# Patient Record
Sex: MALE | Race: WHITE | NOT HISPANIC OR LATINO | Employment: FULL TIME | URBAN - METROPOLITAN AREA
[De-identification: names, ages, dates, MRNs, and addresses within clinical notes are randomized per-mention and may not be internally consistent; named-entity substitution may affect disease eponyms.]

---

## 2017-02-23 ENCOUNTER — APPOINTMENT (OUTPATIENT)
Dept: LAB | Facility: HOSPITAL | Age: 57
End: 2017-02-23

## 2017-02-28 ENCOUNTER — LAB (OUTPATIENT)
Dept: LAB | Facility: HOSPITAL | Age: 57
End: 2017-02-28

## 2017-02-28 DIAGNOSIS — D69.6 THROMBOCYTOPENIA (HCC): Primary | ICD-10-CM

## 2017-02-28 LAB
BASOPHILS # BLD AUTO: 0.02 10*3/MM3 (ref 0–0.1)
BASOPHILS NFR BLD AUTO: 0.5 % (ref 0–1.1)
DEPRECATED RDW RBC AUTO: 39.7 FL (ref 37–49)
EOSINOPHIL # BLD AUTO: 0.08 10*3/MM3 (ref 0–0.36)
EOSINOPHIL NFR BLD AUTO: 1.8 % (ref 1–5)
ERYTHROCYTE [DISTWIDTH] IN BLOOD BY AUTOMATED COUNT: 12.7 % (ref 11.7–14.5)
HCT VFR BLD AUTO: 38.6 % (ref 40–49)
HGB BLD-MCNC: 13.2 G/DL (ref 13.5–16.5)
IMM GRANULOCYTES # BLD: 0.02 10*3/MM3 (ref 0–0.03)
IMM GRANULOCYTES NFR BLD: 0.5 % (ref 0–0.5)
LYMPHOCYTES # BLD AUTO: 1.07 10*3/MM3 (ref 1–3.5)
LYMPHOCYTES NFR BLD AUTO: 24.3 % (ref 20–49)
MCH RBC QN AUTO: 29.5 PG (ref 27–33)
MCHC RBC AUTO-ENTMCNC: 34.2 G/DL (ref 32–35)
MCV RBC AUTO: 86.2 FL (ref 83–97)
MONOCYTES # BLD AUTO: 0.24 10*3/MM3 (ref 0.25–0.8)
MONOCYTES NFR BLD AUTO: 5.4 % (ref 4–12)
NEUTROPHILS # BLD AUTO: 2.98 10*3/MM3 (ref 1.5–7)
NEUTROPHILS NFR BLD AUTO: 67.5 % (ref 39–75)
NRBC BLD MANUAL-RTO: 0 /100 WBC (ref 0–0)
PLATELET # BLD AUTO: 147 10*3/MM3 (ref 150–375)
PMV BLD AUTO: 10.7 FL (ref 8.9–12.1)
RBC # BLD AUTO: 4.48 10*6/MM3 (ref 4.3–5.5)
WBC NRBC COR # BLD: 4.41 10*3/MM3 (ref 4–10)

## 2017-02-28 PROCEDURE — 85025 COMPLETE CBC W/AUTO DIFF WBC: CPT

## 2017-02-28 PROCEDURE — 36415 COLL VENOUS BLD VENIPUNCTURE: CPT

## 2017-10-17 ENCOUNTER — TELEPHONE (OUTPATIENT)
Dept: ONCOLOGY | Facility: CLINIC | Age: 57
End: 2017-10-17

## 2017-10-17 ENCOUNTER — TELEPHONE (OUTPATIENT)
Dept: ONCOLOGY | Facility: HOSPITAL | Age: 57
End: 2017-10-17

## 2017-10-17 NOTE — TELEPHONE ENCOUNTER
Pt called inquring about labs to be drawn at next appt in December.  I informed him that CBC is ordered, he asked about having CMP.  Discussed with Dr Morrow, will add CMP, pt wants to have these drawn prior to appt, message sent to appt desk to schedule approx  One week before MD appt.

## 2017-11-28 ENCOUNTER — LAB (OUTPATIENT)
Dept: LAB | Facility: HOSPITAL | Age: 57
End: 2017-11-28

## 2017-11-28 DIAGNOSIS — D69.6 THROMBOCYTOPENIA (HCC): Primary | ICD-10-CM

## 2017-11-28 LAB
ALBUMIN SERPL-MCNC: 4.5 G/DL (ref 3.5–5.2)
ALBUMIN/GLOB SERPL: 2 G/DL (ref 1.1–2.4)
ALP SERPL-CCNC: 37 U/L (ref 38–116)
ALT SERPL W P-5'-P-CCNC: 29 U/L (ref 0–41)
ANION GAP SERPL CALCULATED.3IONS-SCNC: 10.1 MMOL/L
AST SERPL-CCNC: 27 U/L (ref 0–40)
BASOPHILS # BLD AUTO: 0 10*3/MM3 (ref 0–0.1)
BASOPHILS NFR BLD AUTO: 0 % (ref 0–1.1)
BILIRUB SERPL-MCNC: 0.6 MG/DL (ref 0.1–1.2)
BUN BLD-MCNC: 24 MG/DL (ref 6–20)
BUN/CREAT SERPL: 21.2 (ref 7.3–30)
CALCIUM SPEC-SCNC: 9.9 MG/DL (ref 8.5–10.2)
CHLORIDE SERPL-SCNC: 103 MMOL/L (ref 98–107)
CO2 SERPL-SCNC: 26.9 MMOL/L (ref 22–29)
CREAT BLD-MCNC: 1.13 MG/DL (ref 0.7–1.3)
DEPRECATED RDW RBC AUTO: 39.3 FL (ref 37–49)
EOSINOPHIL # BLD AUTO: 0.06 10*3/MM3 (ref 0–0.36)
EOSINOPHIL NFR BLD AUTO: 1 % (ref 1–5)
ERYTHROCYTE [DISTWIDTH] IN BLOOD BY AUTOMATED COUNT: 12.4 % (ref 11.7–14.5)
GFR SERPL CREATININE-BSD FRML MDRD: 67 ML/MIN/1.73
GLOBULIN UR ELPH-MCNC: 2.3 GM/DL (ref 1.8–3.5)
GLUCOSE BLD-MCNC: 100 MG/DL (ref 74–124)
HCT VFR BLD AUTO: 40.6 % (ref 40–49)
HGB BLD-MCNC: 14.1 G/DL (ref 13.5–16.5)
IMM GRANULOCYTES # BLD: 0.02 10*3/MM3 (ref 0–0.03)
IMM GRANULOCYTES NFR BLD: 0.3 % (ref 0–0.5)
LYMPHOCYTES # BLD AUTO: 0.81 10*3/MM3 (ref 1–3.5)
LYMPHOCYTES NFR BLD AUTO: 13 % (ref 20–49)
MCH RBC QN AUTO: 30 PG (ref 27–33)
MCHC RBC AUTO-ENTMCNC: 34.7 G/DL (ref 32–35)
MCV RBC AUTO: 86.4 FL (ref 83–97)
MONOCYTES # BLD AUTO: 0.24 10*3/MM3 (ref 0.25–0.8)
MONOCYTES NFR BLD AUTO: 3.9 % (ref 4–12)
NEUTROPHILS # BLD AUTO: 5.08 10*3/MM3 (ref 1.5–7)
NEUTROPHILS NFR BLD AUTO: 81.8 % (ref 39–75)
NRBC BLD MANUAL-RTO: 0 /100 WBC (ref 0–0)
PLATELET # BLD AUTO: 152 10*3/MM3 (ref 150–375)
PMV BLD AUTO: 9.8 FL (ref 8.9–12.1)
POTASSIUM BLD-SCNC: 5.4 MMOL/L (ref 3.5–4.7)
PROT SERPL-MCNC: 6.8 G/DL (ref 6.3–8)
RBC # BLD AUTO: 4.7 10*6/MM3 (ref 4.3–5.5)
SODIUM BLD-SCNC: 140 MMOL/L (ref 134–145)
WBC NRBC COR # BLD: 6.21 10*3/MM3 (ref 4–10)

## 2017-11-28 PROCEDURE — 80053 COMPREHEN METABOLIC PANEL: CPT | Performed by: INTERNAL MEDICINE

## 2017-11-28 PROCEDURE — 36415 COLL VENOUS BLD VENIPUNCTURE: CPT | Performed by: INTERNAL MEDICINE

## 2017-11-28 PROCEDURE — 85025 COMPLETE CBC W/AUTO DIFF WBC: CPT | Performed by: INTERNAL MEDICINE

## 2017-12-04 ENCOUNTER — APPOINTMENT (OUTPATIENT)
Dept: LAB | Facility: HOSPITAL | Age: 57
End: 2017-12-04

## 2017-12-04 ENCOUNTER — OFFICE VISIT (OUTPATIENT)
Dept: ONCOLOGY | Facility: CLINIC | Age: 57
End: 2017-12-04

## 2017-12-04 VITALS
BODY MASS INDEX: 25.68 KG/M2 | RESPIRATION RATE: 16 BRPM | DIASTOLIC BLOOD PRESSURE: 82 MMHG | SYSTOLIC BLOOD PRESSURE: 138 MMHG | OXYGEN SATURATION: 100 % | WEIGHT: 150.4 LBS | TEMPERATURE: 98.4 F | HEART RATE: 65 BPM | HEIGHT: 64 IN

## 2017-12-04 DIAGNOSIS — C91.41 HAIRY CELL LEUKEMIA, IN REMISSION (HCC): Primary | ICD-10-CM

## 2017-12-04 PROCEDURE — G0463 HOSPITAL OUTPT CLINIC VISIT: HCPCS | Performed by: INTERNAL MEDICINE

## 2017-12-04 PROCEDURE — 99213 OFFICE O/P EST LOW 20 MIN: CPT | Performed by: INTERNAL MEDICINE

## 2017-12-04 NOTE — PROGRESS NOTES
Subjective .  Feels excellent    REASONS FOR FOLLOWUP:    1. Presentation with thrombocytopenia, mild leukopenia and splenomegaly.   2. Diagnosis hairy cell leukemia.   3. Status post hospitalization 02/06/2012-02/15/2012 per treatment with cladribine (2CdA x 7 days continuous IV infusion).   4. Evidence of CR noted 05/09/2012 with interval resolution of splenomegaly, notable on physical exam and peripheral blood smear.   5. Patien t with continued CR noted 09/05/2012, every 3 month assessment to 1 year anniversary planned, every 4 months 2nd year, every 6 months years 3-5.   6. Patient seen 03/05/2013 stable, with plans to move to every 4 month assessment. Patient proceeding through t reatment with Suboxone.   7. The patient was seen on 10/25/2013, stable hematologically and reassessment q.6 months planned.   8. The patient was seen 04/10/2014, status post recent apparent viral illness with normalization of peripheral blood counts and symptoms.   9. Patient seen 09/26/2014, stable - continued remission noted.   10. Patient seen on 03/17/2015, stable in continued remission, every 6 month assessment planned.                    11.  Patient was seen 10/13/2015 again in continued                                         remission, every 6 month CBC, planned with yearly                                   followup per MD.                    12.  Patient seen in office August 04, 2016, stable, no                                        evidence recurrent disease        13.  Patient reviewed December 04, 2017, continued remission    HISTORY OF PRESENT ILLNESS:  The patient is a 57 y.o. year old male who is here for follow-up with the above-mentioned history.    History of Present Illness     The patient is a 57-year-old male who has been previously healthy. He had some routine blood work performed recently with Dr. Sinclair on 01/17/2012 showing significant thrombocytopenia with a platelet count of 79,000. His white count  was low normal at 4700 with decreased granulocytes at 31% with lymphocytes elevated at 55%. His serum chemistries at the time were unremarkable including normal liver function tests. Mr. Morales had been feeling reasonably w ell although he reported some fatigue. He has had no fevers, chills, night sweats or unexplained weight loss. He has been on a weight loss program and following a low carb diet and has intentionally lost about 40 pounds in the last year or two. He had noted some easier bruising but had been taking aspirin and fish oil even prior to noticing his platelet count was low.   On his examination in the office 01/31/2012 the patient had significant splenomegaly with spleen tip palpable approximately 7-8 cm below the left costal margin. The spleen is not particularly tender and did not extend to midline. Peripheral smear showed large platelets and atypical lymphocytes with no blasts. Mr. Morales was assessed by flow cytometry peripheral blood showing androgeni c profile consistent with hairy cell leukemia. Bone marrow aspirate and biopsy also was confirmatory for hairy cell leukemia with normal chromosomal complement. 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 Additionally CT abdomen and pelvis demonstrated splenomegaly with spleen measuring 22 cm in craniocaudal extent up to 16.7 x 8 in the axial plane. No other abnormalities were present. After discussion the patient was advised per linda osborn with 2CdA. He was admitted 02/06/2015-02/15/2012 with a dual lumen PICC line placed. 2CdA began after initial hydration and continued over the subsequent 7 days by continuous IV infusion. He did relatively well except for weakness and fatigue. He ha d additional issues with constipation and exacerbation of chronic low back pain. He further had development of neutropenia 02/12/2012 and was placed on Diflucan, Valtrex and Levaquin. These he  also tolerated well. He also required transfusion just prior to discharge. Finally the patient was asked to return 02/16/2012 for Neulasta which he did return back to take in the office. He subsequently has done relatively well as an outpatient with white count increasing to 13,000 by 02/20/2012 and peaking at 20 , 000 by 02/24/2012. Platelet count also improved substantially peaking 02/23/2012 at 177,000. He has otherwise returned and we were able to remove his PICC line by 02/24/2012 and on 02/27/2012 continued resolution of his leukopenia, antiviral and antibac t erial medication discontinued. The patient now returns today feeling weak in the morning but otherwise doing well for the rest of the day without any fevers, chills, and resolution of night sweats. Appetite, weight and performance status have remained o verall excellent. He has an episode of gouty arthropathy involving his left great toe. He used Indocin briefly and then went back to allopurinol but is now having his gout return this morning.   The patient thereafter did use Indocin successful and thereafter restarted allopurinol. His gouty arthropathy responded appropriately and has not returned. Followup counts were requested and as he returns today Mr. Morales is feeling well. He has returne d to exercise and nearly to his full-time job. He still notes some degree of fatigue in the a.m. and by the early afternoon approximately 12:30-1:00 p.m. He is at work full-time. He has had no fever, chills, weight loss, night sweats, nausea or vomiting, c hange in bowel habit.   The patient was asked to return back for followup reassessment. CT scan 05/02/2012 per splenomegaly showed spleen to have regressed substantially in size. The AP in 1st dimension previously 16.7 x 8 is now 12.2 by 6.1. No other abno rmalities were present. Peripheral smear also shows no evidence of recurrent disease. The patient feels well and has returned to normal activity.   The  patient was asked to return for followup now seen 09/05/2012 doing well with normal activity again with considerable improvement in his general performance status.   The patient is now seen 03/05/2013 continuing to do well. It came to the attention however through a [facility] report that he is current on Suboxone through Dr. Kalia Davidson. In discussion with the patient he evidently had difficulty in trying to discontinue pain medications in the spring of 2012. He eventually saw Dr. Davidson who was hoping to wean him altogether from pain medications with the use of Suboxone. 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 He does this primarily through diet at present.   The patient thereafter was asked to return in followup. He is now seen on 10/25/2013 feeling well overall, continuing with exercise and dietary program. He feels well overall except for mild headache, approximately at 3:00-4:00 p.m. each day(?).   The patient thereafter was asked to be seen back 6 months later . He is now seen 09/26/2014 doing well continuing his exercise and dietary program to terrific effect. He has had no issues since last seen and in fact has normalized his hypertension as a result of his dietary program.   The patient thereafter was asked to be seen 6 months later. He continues to do his exercise and dietary program to wonderful effect. He has had no additional issues since last seen.   The patient is now seen back 6 months later, 10/13/2015, and fortunately does continue to do amazingly well. He has maintained his exercise and dietary program, again to excellent and continued significant effect.   Patient now reviewed August 04, 2016.  He continues to feel well except for periodic fatigue.    The patient is reviewed December 04, 2017.  Hematologically he is stable and remains in remission.  Plans  are to eventually discontinue Suboxone as well.    Past Medical History:   Diagnosis Date   • Cancer     hairy cell leukemia   • Coronary artery disease     minimal with some minimal narrowing of 1 vessel   • ED (erectile dysfunction) of organic origin    • H/O vitamin D deficiency    • Hypertension    • Thrombocytopenia        ONCOLOGIC HISTORY:  (History from previous dates can be found in the separate document.)    Current Outpatient Prescriptions on File Prior to Visit   Medication Sig Dispense Refill   • indomethacin (INDOCIN) 25 MG capsule Take 25 mg by mouth 3 (three) times a day as needed for mild pain (1-3).     • LEVITRA 20 MG tablet TK 1 T PO  PRN  0   • SUBOXONE 12-3 MG film sublingual film dissolve 1 FILM under the tongue every morning  0     No current facility-administered medications on file prior to visit.        ALLERGIES:     Allergies   Allergen Reactions   • Ciprofloxacin Rash       Social History     Social History   • Marital status:      Spouse name: Radha   • Number of children: N/A   • Years of education: N/A     Occupational History   •  [employer]     Social History Main Topics   • Smoking status: Never Smoker   • Smokeless tobacco: Never Used   • Alcohol use 1.2 oz/week     2 Cans of beer per week      Comment: social   • Drug use: Not on file   • Sexual activity: Not on file     Other Topics Concern   • Not on file     Social History Narrative         Cancer-related family history is not on file.     Review of Systems  A comprehensive 14 point review of systems was performed and was negative except as mentioned.    Objective      There were no vitals filed for this visit.  Current Status 8/4/2016   ECOG score 0       Physical Exam    GENERAL: Well-developed, well-nourished  male in no acute distress.   SKIN: Warm, dry without rashes, purpura or petechiae.   HEAD: Normocephalic.   EYES: Pupils equal, round and reactive to light. EOMs intact.  Conjunctivae normal.   EARS: Hearing intact.   NOSE: Septum midline. No excoriations or nasal discharge.   MOUTH: Tongue is well-papillated; no stomatitis or ulcers. Lips normal.   THROAT: Oropharynx without lesions or exudates.   NECK: Supple with good range of motion; no thyromegaly or masses, no JVD or bruits.   LYMPHATICS: No cervical, supraclavicular, axillary or inguinal adenopathy.   CHEST: Lungs clear to percussion and auscultation.   CARDIAC: Regular rate and rhythm without murmurs, rubs or gallops.   ABDOMEN: Soft, nontender with no organomegaly or masses.   EXTREMITIES: No clubbing, cyanosis or edema.   NEUROLOGICAL: No focal neurological deficits.    RECENT LABS:  Hematology WBC   Date Value Ref Range Status   11/28/2017 6.21 4.00 - 10.00 10*3/mm3 Final     RBC   Date Value Ref Range Status   11/28/2017 4.70 4.30 - 5.50 10*6/mm3 Final     Hemoglobin   Date Value Ref Range Status   11/28/2017 14.1 13.5 - 16.5 g/dL Final     Hematocrit   Date Value Ref Range Status   11/28/2017 40.6 40.0 - 49.0 % Final     Platelets   Date Value Ref Range Status   11/28/2017 152 150 - 375 10*3/mm3 Final        Assessment/Plan       A 57-year-old male with a history of hairy cell leukemia. He presented with leukopenia, thrombocytopenia, and splenomegaly. After diagnosis was confirmed he was treated with cladribine 02/06/2012-02/15/2012 by continuous IV infusion. H e developed cytopenias, but again not to a serious degree and ultimately exam in late February 2012 was negative for evidence of residual and flow cytometric assessment. Subsequent physical examination showed resolution of splenomegaly and normalization o f performance status. Followup CT also normalized as well per splenomegaly. The patient has been followed since and when seen in March 2013 and October 2013, was felt to be in CR. He had been seen just after recent viral illness and though he was feeling poorly, symptoms went on to improve and returned to  baseline status. At 6 month review, the patient continues in remission. This further vindicates and since last visit, he has had no additional urinary symptoms either.   At this point we find no evidence of recurrent disease and again feel that Mr. Morales remains in complete remission. This has been the case in 03/17/2015 and again is notable 10/13/2015 as well as April 2016, August 2016 and December 2017                                                                              We will plan:   1. To have a CBC check every 6 months and seen by the physician yearly.

## 2018-02-07 ENCOUNTER — OFFICE VISIT (OUTPATIENT)
Dept: INTERNAL MEDICINE | Facility: CLINIC | Age: 58
End: 2018-02-07

## 2018-02-07 VITALS
SYSTOLIC BLOOD PRESSURE: 128 MMHG | WEIGHT: 146 LBS | HEIGHT: 64 IN | DIASTOLIC BLOOD PRESSURE: 86 MMHG | BODY MASS INDEX: 24.92 KG/M2

## 2018-02-07 DIAGNOSIS — D69.6 THROMBOCYTOPENIA (HCC): ICD-10-CM

## 2018-02-07 DIAGNOSIS — J30.1 CHRONIC SEASONAL ALLERGIC RHINITIS DUE TO POLLEN: Chronic | ICD-10-CM

## 2018-02-07 DIAGNOSIS — C91.41 HAIRY CELL LEUKEMIA, IN REMISSION (HCC): ICD-10-CM

## 2018-02-07 DIAGNOSIS — N52.01 ERECTILE DYSFUNCTION DUE TO ARTERIAL INSUFFICIENCY: Chronic | ICD-10-CM

## 2018-02-07 DIAGNOSIS — E55.9 VITAMIN D DEFICIENCY: Primary | Chronic | ICD-10-CM

## 2018-02-07 PROBLEM — R03.0 SINGLE EPISODE OF ELEVATED BLOOD PRESSURE: Status: ACTIVE | Noted: 2017-12-12

## 2018-02-07 LAB
25(OH)D3 SERPL-MCNC: 49.2 NG/ML (ref 30–100)
ALBUMIN SERPL-MCNC: 4.7 G/DL (ref 3.5–5.2)
ALBUMIN/GLOB SERPL: 1.9 G/DL
ALP SERPL-CCNC: 38 U/L (ref 39–117)
ALT SERPL W P-5'-P-CCNC: 22 U/L (ref 1–41)
ANION GAP SERPL CALCULATED.3IONS-SCNC: 13.4 MMOL/L
AST SERPL-CCNC: 22 U/L (ref 1–40)
BASOPHILS # BLD AUTO: 0.01 10*3/MM3 (ref 0–0.2)
BASOPHILS NFR BLD AUTO: 0.2 % (ref 0–2)
BILIRUB SERPL-MCNC: 0.7 MG/DL (ref 0.1–1.2)
BILIRUB UR QL STRIP: NEGATIVE
BUN BLD-MCNC: 26 MG/DL (ref 6–20)
BUN/CREAT SERPL: 24.1 (ref 7–25)
CALCIUM SPEC-SCNC: 10.3 MG/DL (ref 8.6–10.5)
CHLORIDE SERPL-SCNC: 103 MMOL/L (ref 98–107)
CLARITY UR: CLEAR
CO2 SERPL-SCNC: 27.6 MMOL/L (ref 22–29)
COLOR UR: YELLOW
CREAT BLD-MCNC: 1.08 MG/DL (ref 0.76–1.27)
DEPRECATED RDW RBC AUTO: 41.1 FL (ref 37–54)
EOSINOPHIL # BLD AUTO: 0.11 10*3/MM3 (ref 0–0.7)
EOSINOPHIL NFR BLD AUTO: 2.3 % (ref 0–5)
ERYTHROCYTE [DISTWIDTH] IN BLOOD BY AUTOMATED COUNT: 13 % (ref 11.5–15)
GFR SERPL CREATININE-BSD FRML MDRD: 70 ML/MIN/1.73
GLOBULIN UR ELPH-MCNC: 2.5 GM/DL
GLUCOSE BLD-MCNC: 93 MG/DL (ref 65–99)
GLUCOSE UR STRIP-MCNC: NEGATIVE MG/DL
HCT VFR BLD AUTO: 42.5 % (ref 40.1–51)
HGB BLD-MCNC: 14.4 G/DL (ref 13.7–17.5)
HGB UR QL STRIP.AUTO: NEGATIVE
KETONES UR QL STRIP: NEGATIVE
LEUKOCYTE ESTERASE UR QL STRIP.AUTO: NEGATIVE
LYMPHOCYTES # BLD AUTO: 1.02 10*3/MM3 (ref 0.8–7)
LYMPHOCYTES NFR BLD AUTO: 20.9 % (ref 10–60)
MCH RBC QN AUTO: 29.9 PG (ref 26–34)
MCHC RBC AUTO-ENTMCNC: 33.9 G/DL (ref 31–37)
MCV RBC AUTO: 88.2 FL (ref 80–100)
MONOCYTES # BLD AUTO: 0.25 10*3/MM3 (ref 0–1)
MONOCYTES NFR BLD AUTO: 5.1 % (ref 0–13)
NEUTROPHILS # BLD AUTO: 3.49 10*3/MM3 (ref 1–11)
NEUTROPHILS NFR BLD AUTO: 71.5 % (ref 30–85)
NITRITE UR QL STRIP: NEGATIVE
PH UR STRIP.AUTO: 5.5 [PH] (ref 5–8)
PLATELET # BLD AUTO: 169 10*3/MM3 (ref 150–450)
PMV BLD AUTO: 11.4 FL (ref 6–12)
POTASSIUM BLD-SCNC: 5.3 MMOL/L (ref 3.5–5.2)
PROT SERPL-MCNC: 7.2 G/DL (ref 6–8.5)
PROT UR QL STRIP: NEGATIVE
RBC # BLD AUTO: 4.82 10*6/MM3 (ref 4.63–6.08)
SODIUM BLD-SCNC: 144 MMOL/L (ref 136–145)
SP GR UR STRIP: 1.01 (ref 1–1.03)
UROBILINOGEN UR QL STRIP: NORMAL
WBC NRBC COR # BLD: 4.88 10*3/MM3 (ref 5–10)

## 2018-02-07 PROCEDURE — 99214 OFFICE O/P EST MOD 30 MIN: CPT | Performed by: INTERNAL MEDICINE

## 2018-02-07 PROCEDURE — 36415 COLL VENOUS BLD VENIPUNCTURE: CPT | Performed by: INTERNAL MEDICINE

## 2018-02-07 PROCEDURE — 81003 URINALYSIS AUTO W/O SCOPE: CPT | Performed by: INTERNAL MEDICINE

## 2018-02-07 PROCEDURE — 80053 COMPREHEN METABOLIC PANEL: CPT | Performed by: INTERNAL MEDICINE

## 2018-02-07 PROCEDURE — 82306 VITAMIN D 25 HYDROXY: CPT | Performed by: INTERNAL MEDICINE

## 2018-02-07 PROCEDURE — 85025 COMPLETE CBC W/AUTO DIFF WBC: CPT | Performed by: INTERNAL MEDICINE

## 2018-02-07 RX ORDER — SILDENAFIL CITRATE 20 MG/1
20-100 TABLET ORAL
COMMUNITY
Start: 2018-01-30 | End: 2018-08-14 | Stop reason: SDUPTHER

## 2018-02-07 NOTE — PROGRESS NOTES
Subjective   Solo Morales is a 57 y.o. male.     History of Present Illness     The following portions of the patient's history were reviewed and updated as appropriate: allergies, current medications, past family history, past medical history, past social history, past surgical history and problem list.  58 yo/m with Hairy cell Leukemia (in remission), ED, Vitamin D deficiency, Thrombocytopenia, allergic rhinitis, here for follow up. He is doing well overall. He is in a successful suboxone program. Needs a lab check today.  Review of Systems   Constitutional: Positive for fatigue.   HENT: Negative.    Eyes: Negative.    Respiratory: Negative.    Cardiovascular: Negative.    Gastrointestinal: Negative.    Endocrine: Negative.    Genitourinary: Negative.    Musculoskeletal: Negative.    Skin: Negative.    Allergic/Immunologic: Negative.    Neurological: Negative.    Hematological: Negative.    Psychiatric/Behavioral: Negative.        Objective   Physical Exam   Constitutional: He is oriented to person, place, and time. He appears well-developed and well-nourished.   HENT:   Head: Normocephalic and atraumatic.   Eyes: EOM are normal. Pupils are equal, round, and reactive to light.   Neck: Normal range of motion. Neck supple.   Cardiovascular: Normal rate, regular rhythm and normal heart sounds.    Pulmonary/Chest: Effort normal and breath sounds normal.   Abdominal: Soft. Bowel sounds are normal.   Musculoskeletal: Normal range of motion.   Neurological: He is alert and oriented to person, place, and time. He has normal reflexes.   Skin: Skin is warm and dry.   Psychiatric: He has a normal mood and affect. His behavior is normal. Judgment and thought content normal.   Nursing note and vitals reviewed.      Assessment/Plan   Solo was seen today for hypertension.    Diagnoses and all orders for this visit:    Vitamin D deficiency  Comments:  well controlled at present but will check levels today  Orders:  -     Vitamin  D 25 Hydroxy; Future  -     Comprehensive Metabolic Panel; Future    Erectile dysfunction due to arterial insufficiency  Comments:  well controlled  Orders:  -     Comprehensive Metabolic Panel; Future    Thrombocytopenia  Comments:  will check CBC today  Orders:  -     CBC & Differential; Future    Hairy cell leukemia, in remission  Comments:  will check a CBC  Orders:  -     CBC & Differential; Future    Chronic seasonal allergic rhinitis due to pollen  Comments:  does better during the winter

## 2018-03-26 ENCOUNTER — TELEPHONE (OUTPATIENT)
Dept: INTERNAL MEDICINE | Facility: CLINIC | Age: 58
End: 2018-03-26

## 2018-03-26 RX ORDER — CLARITHROMYCIN 500 MG/1
500 TABLET, COATED ORAL EVERY 12 HOURS SCHEDULED
Qty: 20 TABLET | Refills: 1 | Status: SHIPPED | OUTPATIENT
Start: 2018-03-26 | End: 2018-08-14

## 2018-03-26 RX ORDER — ALBUTEROL SULFATE 90 UG/1
2 AEROSOL, METERED RESPIRATORY (INHALATION) EVERY 4 HOURS PRN
Qty: 1 INHALER | Refills: 3 | Status: SHIPPED | OUTPATIENT
Start: 2018-03-26 | End: 2019-12-16 | Stop reason: SDUPTHER

## 2018-03-26 NOTE — TELEPHONE ENCOUNTER
----- Message from Diana Cardona sent at 3/26/2018 10:53 AM EDT -----  Contact: Pt   Pt would like a refill for his proventil 6.7grams 200 puffs HFA (not on chart)    [facility] [address] [address] AT [facility] [address] - [phone]  - [phone] FX    Pt also states he will be traveling and would like an antibiotic to take just incase.    Please advise.  Pt#[phone]

## 2018-05-21 ENCOUNTER — LAB (OUTPATIENT)
Dept: LAB | Facility: HOSPITAL | Age: 58
End: 2018-05-21

## 2018-05-21 ENCOUNTER — CLINICAL SUPPORT (OUTPATIENT)
Dept: ONCOLOGY | Facility: HOSPITAL | Age: 58
End: 2018-05-21

## 2018-05-21 DIAGNOSIS — D69.6 THROMBOCYTOPENIA (HCC): Primary | ICD-10-CM

## 2018-05-21 LAB
BASOPHILS # BLD AUTO: 0.02 10*3/MM3 (ref 0–0.1)
BASOPHILS NFR BLD AUTO: 0.4 % (ref 0–1.1)
DEPRECATED RDW RBC AUTO: 38.5 FL (ref 37–49)
EOSINOPHIL # BLD AUTO: 0.1 10*3/MM3 (ref 0–0.36)
EOSINOPHIL NFR BLD AUTO: 2.2 % (ref 1–5)
ERYTHROCYTE [DISTWIDTH] IN BLOOD BY AUTOMATED COUNT: 12.6 % (ref 11.7–14.5)
HCT VFR BLD AUTO: 40.5 % (ref 40–49)
HGB BLD-MCNC: 14.1 G/DL (ref 13.5–16.5)
IMM GRANULOCYTES # BLD: 0.03 10*3/MM3 (ref 0–0.03)
IMM GRANULOCYTES NFR BLD: 0.7 % (ref 0–0.5)
LYMPHOCYTES # BLD AUTO: 1.63 10*3/MM3 (ref 1–3.5)
LYMPHOCYTES NFR BLD AUTO: 35.7 % (ref 20–49)
MCH RBC QN AUTO: 29.3 PG (ref 27–33)
MCHC RBC AUTO-ENTMCNC: 34.8 G/DL (ref 32–35)
MCV RBC AUTO: 84.2 FL (ref 83–97)
MONOCYTES # BLD AUTO: 0.3 10*3/MM3 (ref 0.25–0.8)
MONOCYTES NFR BLD AUTO: 6.6 % (ref 4–12)
NEUTROPHILS # BLD AUTO: 2.49 10*3/MM3 (ref 1.5–7)
NEUTROPHILS NFR BLD AUTO: 54.4 % (ref 39–75)
NRBC BLD MANUAL-RTO: 0 /100 WBC (ref 0–0)
PLATELET # BLD AUTO: 152 10*3/MM3 (ref 150–375)
PMV BLD AUTO: 10.5 FL (ref 8.9–12.1)
RBC # BLD AUTO: 4.81 10*6/MM3 (ref 4.3–5.5)
WBC NRBC COR # BLD: 4.57 10*3/MM3 (ref 4–10)

## 2018-05-21 PROCEDURE — 36415 COLL VENOUS BLD VENIPUNCTURE: CPT | Performed by: INTERNAL MEDICINE

## 2018-05-21 PROCEDURE — 85025 COMPLETE CBC W/AUTO DIFF WBC: CPT | Performed by: INTERNAL MEDICINE

## 2018-05-21 NOTE — PROGRESS NOTES
Pt left appt before seeing RN. Called patient and he said Dr Morrow called him and told him lab results were wnl. Pt had no questions or concerns.

## 2018-07-26 DIAGNOSIS — N52.01 ERECTILE DYSFUNCTION DUE TO ARTERIAL INSUFFICIENCY: ICD-10-CM

## 2018-07-26 DIAGNOSIS — C91.41 HAIRY CELL LEUKEMIA, IN REMISSION (HCC): Primary | ICD-10-CM

## 2018-08-07 ENCOUNTER — LAB (OUTPATIENT)
Dept: INTERNAL MEDICINE | Facility: CLINIC | Age: 58
End: 2018-08-07

## 2018-08-07 DIAGNOSIS — C91.41 HAIRY CELL LEUKEMIA, IN REMISSION (HCC): ICD-10-CM

## 2018-08-07 DIAGNOSIS — N52.01 ERECTILE DYSFUNCTION DUE TO ARTERIAL INSUFFICIENCY: ICD-10-CM

## 2018-08-07 LAB
ALBUMIN SERPL-MCNC: 4.6 G/DL (ref 3.5–5.2)
ALBUMIN/GLOB SERPL: 2.2 G/DL
ALP SERPL-CCNC: 42 U/L (ref 39–117)
ALT SERPL W P-5'-P-CCNC: 27 U/L (ref 1–41)
ANION GAP SERPL CALCULATED.3IONS-SCNC: 10.4 MMOL/L
AST SERPL-CCNC: 22 U/L (ref 1–40)
BASOPHILS # BLD AUTO: 0.02 10*3/MM3 (ref 0–0.2)
BASOPHILS NFR BLD AUTO: 0.5 % (ref 0–2)
BILIRUB SERPL-MCNC: 0.6 MG/DL (ref 0.1–1.2)
BUN BLD-MCNC: 18 MG/DL (ref 6–20)
BUN/CREAT SERPL: 17.3 (ref 7–25)
CALCIUM SPEC-SCNC: 9.8 MG/DL (ref 8.6–10.5)
CHLORIDE SERPL-SCNC: 104 MMOL/L (ref 98–107)
CO2 SERPL-SCNC: 26.6 MMOL/L (ref 22–29)
CREAT BLD-MCNC: 1.04 MG/DL (ref 0.76–1.27)
DEPRECATED RDW RBC AUTO: 40.2 FL (ref 37–54)
EOSINOPHIL # BLD AUTO: 0.08 10*3/MM3 (ref 0–0.7)
EOSINOPHIL NFR BLD AUTO: 1.8 % (ref 0–5)
ERYTHROCYTE [DISTWIDTH] IN BLOOD BY AUTOMATED COUNT: 13.1 % (ref 11.5–15)
GFR SERPL CREATININE-BSD FRML MDRD: 73 ML/MIN/1.73
GLOBULIN UR ELPH-MCNC: 2.1 GM/DL
GLUCOSE BLD-MCNC: 101 MG/DL (ref 65–99)
HCT VFR BLD AUTO: 40 % (ref 40.1–51)
HGB BLD-MCNC: 14 G/DL (ref 13.7–17.5)
LYMPHOCYTES # BLD AUTO: 0.89 10*3/MM3 (ref 0.8–7)
LYMPHOCYTES NFR BLD AUTO: 20.6 % (ref 10–60)
MCH RBC QN AUTO: 30.2 PG (ref 26–34)
MCHC RBC AUTO-ENTMCNC: 35 G/DL (ref 31–37)
MCV RBC AUTO: 86.2 FL (ref 80–100)
MONOCYTES # BLD AUTO: 0.22 10*3/MM3 (ref 0–1)
MONOCYTES NFR BLD AUTO: 5.1 % (ref 0–13)
NEUTROPHILS # BLD AUTO: 3.12 10*3/MM3 (ref 1–11)
NEUTROPHILS NFR BLD AUTO: 72 % (ref 30–85)
PLATELET # BLD AUTO: 158 10*3/MM3 (ref 150–450)
PMV BLD AUTO: 10.3 FL (ref 6–12)
POTASSIUM BLD-SCNC: 4.9 MMOL/L (ref 3.5–5.2)
PROT SERPL-MCNC: 6.7 G/DL (ref 6–8.5)
RBC # BLD AUTO: 4.64 10*6/MM3 (ref 4.63–6.08)
SODIUM BLD-SCNC: 141 MMOL/L (ref 136–145)
TSH SERPL DL<=0.05 MIU/L-ACNC: 2.6 MIU/ML (ref 0.27–4.2)
WBC NRBC COR # BLD: 4.33 10*3/MM3 (ref 5–10)

## 2018-08-07 PROCEDURE — 84443 ASSAY THYROID STIM HORMONE: CPT | Performed by: INTERNAL MEDICINE

## 2018-08-07 PROCEDURE — 36415 COLL VENOUS BLD VENIPUNCTURE: CPT | Performed by: INTERNAL MEDICINE

## 2018-08-07 PROCEDURE — 85025 COMPLETE CBC W/AUTO DIFF WBC: CPT | Performed by: INTERNAL MEDICINE

## 2018-08-07 PROCEDURE — 80053 COMPREHEN METABOLIC PANEL: CPT | Performed by: INTERNAL MEDICINE

## 2018-08-14 ENCOUNTER — OFFICE VISIT (OUTPATIENT)
Dept: INTERNAL MEDICINE | Facility: CLINIC | Age: 58
End: 2018-08-14

## 2018-08-14 VITALS
SYSTOLIC BLOOD PRESSURE: 128 MMHG | HEIGHT: 64 IN | BODY MASS INDEX: 25.61 KG/M2 | WEIGHT: 150 LBS | DIASTOLIC BLOOD PRESSURE: 84 MMHG

## 2018-08-14 DIAGNOSIS — N52.01 ERECTILE DYSFUNCTION DUE TO ARTERIAL INSUFFICIENCY: ICD-10-CM

## 2018-08-14 DIAGNOSIS — D69.6 THROMBOCYTOPENIA (HCC): ICD-10-CM

## 2018-08-14 DIAGNOSIS — E55.9 VITAMIN D DEFICIENCY: ICD-10-CM

## 2018-08-14 DIAGNOSIS — Z51.81 ENCOUNTER FOR MONITORING SUBOXONE MAINTENANCE THERAPY: Chronic | ICD-10-CM

## 2018-08-14 DIAGNOSIS — Z12.11 ENCOUNTER FOR SCREENING COLONOSCOPY: ICD-10-CM

## 2018-08-14 DIAGNOSIS — Z79.899 ENCOUNTER FOR MONITORING SUBOXONE MAINTENANCE THERAPY: Chronic | ICD-10-CM

## 2018-08-14 DIAGNOSIS — C91.41 HAIRY CELL LEUKEMIA, IN REMISSION (HCC): Primary | Chronic | ICD-10-CM

## 2018-08-14 PROCEDURE — 99214 OFFICE O/P EST MOD 30 MIN: CPT | Performed by: INTERNAL MEDICINE

## 2018-08-14 RX ORDER — BUPRENORPHINE HYDROCHLORIDE AND NALOXONE HYDROCHLORIDE DIHYDRATE 8; 2 MG/1; MG/1
TABLET SUBLINGUAL
Refills: 0 | COMMUNITY
Start: 2018-07-24

## 2018-08-14 RX ORDER — VARDENAFIL HYDROCHLORIDE 20 MG/1
TABLET ORAL
COMMUNITY
Start: 2018-03-09 | End: 2018-08-14 | Stop reason: SDUPTHER

## 2018-08-14 RX ORDER — VARDENAFIL HYDROCHLORIDE 20 MG/1
20 TABLET ORAL AS NEEDED
Qty: 20 TABLET | Refills: 3 | Status: SHIPPED | OUTPATIENT
Start: 2018-08-14 | End: 2019-02-04 | Stop reason: SDUPTHER

## 2018-08-14 RX ORDER — SILDENAFIL CITRATE 20 MG/1
20-100 TABLET ORAL 3 TIMES DAILY
Qty: 450 TABLET | Refills: 11 | Status: SHIPPED | OUTPATIENT
Start: 2018-08-14 | End: 2019-02-06 | Stop reason: SDUPTHER

## 2018-08-14 NOTE — PROGRESS NOTES
Subjective   Solo Morales is a 58 y.o. male.     History of Present Illness     The following portions of the patient's history were reviewed and updated as appropriate: allergies, current medications, past family history, past medical history, past social history, past surgical history and problem list.  59 yo/m with a chief complaint of recovering from hairy cell leukemia (currently in remission), on suboxone therapy (doing extremely well), thrombocytopenia (stable at 158K), ED (well compensated), here for follow up. He is doing well overall but needs a colonoscopy.  Review of Systems   Constitutional: Negative.    HENT: Negative.    Eyes: Negative.    Respiratory: Negative.    Cardiovascular: Negative.    Gastrointestinal: Negative.    Endocrine: Negative.    Musculoskeletal: Negative.    Skin: Negative.    Allergic/Immunologic: Negative.    Neurological: Negative.    Hematological: Negative.    Psychiatric/Behavioral: Negative.        Objective   Physical Exam   Constitutional: He is oriented to person, place, and time. He appears well-developed and well-nourished.   HENT:   Head: Normocephalic and atraumatic.   Eyes: Pupils are equal, round, and reactive to light. EOM are normal.   Neck: Normal range of motion. Neck supple.   Cardiovascular: Normal rate, regular rhythm and normal heart sounds.    Pulmonary/Chest: Effort normal and breath sounds normal.   Abdominal: Soft. Bowel sounds are normal.   Musculoskeletal: Normal range of motion.   Neurological: He is alert and oriented to person, place, and time.   Skin: Skin is warm and dry.   Psychiatric: He has a normal mood and affect. His behavior is normal. Judgment and thought content normal.   Nursing note and vitals reviewed.        Assessment/Plan   Solo was seen today for follow-up and nail problem.    Diagnoses and all orders for this visit:    Hairy cell leukemia, in remission (CMS/HCC)  Comments:  doing extremely well    Thrombocytopenia  (CMS/[facility])  Comments:  stable with a plt count of 158 K    Erectile dysfunction due to arterial insufficiency  Comments:  doing well with levitra    Vitamin D deficiency  Comments:  continue vitamin D    Encounter for monitoring Suboxone maintenance therapy  Comments:  doing extremely well

## 2018-08-22 ENCOUNTER — TELEPHONE (OUTPATIENT)
Dept: INTERNAL MEDICINE | Facility: CLINIC | Age: 58
End: 2018-08-22

## 2018-08-22 NOTE — TELEPHONE ENCOUNTER
----- Message from Tomasa Link sent at 8/22/2018 11:59 AM EDT -----  Contact: [facility]  [facility] calling and states patient does not want sildenafil (REVATIO) 20 MG tablet, but would like to take what he was previously on. sildenafil 40 mg Laura. Please advise    [facility]:[phone]

## 2018-09-20 ENCOUNTER — OFFICE VISIT (OUTPATIENT)
Dept: INTERNAL MEDICINE | Facility: CLINIC | Age: 58
End: 2018-09-20

## 2018-09-20 VITALS
BODY MASS INDEX: 25.95 KG/M2 | SYSTOLIC BLOOD PRESSURE: 122 MMHG | TEMPERATURE: 97.7 F | HEART RATE: 52 BPM | DIASTOLIC BLOOD PRESSURE: 78 MMHG | WEIGHT: 152 LBS | OXYGEN SATURATION: 98 % | HEIGHT: 64 IN

## 2018-09-20 DIAGNOSIS — J01.10 ACUTE NON-RECURRENT FRONTAL SINUSITIS: Primary | ICD-10-CM

## 2018-09-20 PROCEDURE — 99213 OFFICE O/P EST LOW 20 MIN: CPT | Performed by: NURSE PRACTITIONER

## 2018-09-20 RX ORDER — AMOXICILLIN AND CLAVULANATE POTASSIUM 875; 125 MG/1; MG/1
1 TABLET, FILM COATED ORAL EVERY 12 HOURS SCHEDULED
Qty: 14 TABLET | Refills: 0 | Status: SHIPPED | OUTPATIENT
Start: 2018-09-20 | End: 2018-09-27

## 2018-09-20 RX ORDER — GUAIFENESIN 600 MG/1
600 TABLET, EXTENDED RELEASE ORAL EVERY 12 HOURS SCHEDULED
Qty: 14 TABLET
Start: 2018-09-20 | End: 2018-09-27

## 2018-09-20 NOTE — PROGRESS NOTES
Subjective   Solo Morales is a 58 y.o. male who presents due to respiratory symptoms.    URI    This is a new problem. The current episode started in the past 7 days. The problem has been rapidly worsening. There has been no fever. Associated symptoms include congestion (productive of sputum), coughing, rhinorrhea, sinus pain, sneezing and a sore throat. Pertinent negatives include no abdominal pain, chest pain, diarrhea, dysuria, ear pain, nausea, swollen glands, vomiting or wheezing. Associated symptoms comments: Feeling lightheaded. He has tried nothing for the symptoms.        The following portions of the patient's history were reviewed and updated as appropriate: allergies, current medications, past social history and problem list.    Past Medical History:   Diagnosis Date   • Cancer (CMS/HCC)     hairy cell leukemia   • Coronary artery disease     minimal with some minimal narrowing of 1 vessel   • ED (erectile dysfunction) of organic origin    • H/O vitamin D deficiency    • Hypertension    • Thrombocytopenia (CMS/HCC)          Current Outpatient Prescriptions:   •  albuterol (PROVENTIL HFA;VENTOLIN HFA) 108 (90 Base) MCG/ACT inhaler, Inhale 2 puffs Every 4 (Four) Hours As Needed for Wheezing., Disp: 1 inhaler, Rfl: 3  •  buprenorphine-naloxone (SUBOXONE) 8-2 MG per SL tablet, TAKE 2 TABLETS UNDER TONGUE EVERY MORNING CS EXPRESS SC HL, Disp: , Rfl: 0  •  Chlorcyclizine-Pseudoephed (STAHIST AD) 25-60 MG tablet, One tablet three times a day as needed for congestion, Disp: 30 tablet, Rfl: 0  •  indomethacin (INDOCIN) 25 MG capsule, Take 25 mg by mouth 3 (three) times a day as needed for mild pain (1-3)., Disp: , Rfl:   •  sildenafil (REVATIO) 20 MG tablet, Take 1-5 tablets by mouth 3 (Three) Times a Day., Disp: 450 tablet, Rfl: 11  •  vardenafil (LEVITRA) 20 MG tablet, Take 1 tablet by mouth As Needed for erectile dysfunction., Disp: 20 tablet, Rfl: 3  •  amoxicillin-clavulanate (AUGMENTIN) 875-125 MG per  "tablet, Take 1 tablet by mouth Every 12 (Twelve) Hours for 7 days., Disp: 14 tablet, Rfl: 0  •  guaiFENesin (MUCINEX) 600 MG 12 hr tablet, Take 1 tablet by mouth Every 12 (Twelve) Hours for 7 days., Disp: 14 tablet, Rfl:     Allergies   Allergen Reactions   • Ciprofloxacin Rash       Review of Systems   Constitutional: Positive for fatigue. Negative for activity change, appetite change, chills, fever and unexpected weight change.   HENT: Positive for congestion (productive of sputum), postnasal drip, rhinorrhea, sinus pain, sinus pressure, sneezing and sore throat. Negative for drooling, ear pain, facial swelling, hearing loss, mouth sores, nosebleeds, trouble swallowing and voice change.    Eyes: Negative for pain, discharge, itching and visual disturbance.   Respiratory: Positive for cough. Negative for choking, chest tightness, shortness of breath and wheezing.    Cardiovascular: Negative for chest pain and palpitations.   Gastrointestinal: Negative for abdominal pain, constipation, diarrhea, nausea and vomiting.   Endocrine: Negative for polyuria.   Genitourinary: Negative for dysuria and frequency.   Musculoskeletal: Negative for back pain and joint swelling.       Objective   Vitals:    09/20/18 0953   BP: 122/78   BP Location: Left arm   Patient Position: Sitting   Cuff Size: Adult   Pulse: 52   Temp: 97.7 °F (36.5 °C)   TempSrc: Oral   SpO2: 98%   Weight: 68.9 kg (152 lb)   Height: 161.3 cm (63.5\")     Physical Exam   Constitutional: He appears well-developed and well-nourished. He is cooperative. He does not have a sickly appearance. He does not appear ill.   HENT:   Head: Normocephalic.   Right Ear: Hearing, tympanic membrane and external ear normal. No drainage, swelling or tenderness. Tympanic membrane is not injected, not erythematous and not bulging. No middle ear effusion.   Left Ear: Hearing, tympanic membrane and external ear normal. No drainage, swelling or tenderness. Tympanic membrane is not " injected, not erythematous and not bulging.  No middle ear effusion.   Nose: No mucosal edema, rhinorrhea or sinus tenderness. Right sinus exhibits frontal sinus tenderness. Right sinus exhibits no maxillary sinus tenderness. Left sinus exhibits frontal sinus tenderness. Left sinus exhibits no maxillary sinus tenderness.   Mouth/Throat: Mucous membranes are normal. Posterior oropharyngeal erythema present.   Eyes: Conjunctivae and lids are normal. Right eye exhibits no discharge and no exudate. Left eye exhibits no discharge and no exudate.   Neck: Trachea normal and normal range of motion. Edema present.   Cardiovascular: Regular rhythm, normal heart sounds and normal pulses.    No murmur heard.  Pulmonary/Chest: Breath sounds normal. No respiratory distress. He has no decreased breath sounds. He has no wheezes. He has no rhonchi. He has no rales.   Lymphadenopathy:     He has no cervical adenopathy.   Neurological: He is alert.   Skin: Skin is warm, dry and intact.   Nursing note and vitals reviewed.      Assessment/Plan   Solo was seen today for uri.    Diagnoses and all orders for this visit:    Acute non-recurrent frontal sinusitis  -     guaiFENesin (MUCINEX) 600 MG 12 hr tablet; Take 1 tablet by mouth Every 12 (Twelve) Hours for 7 days.  -     amoxicillin-clavulanate (AUGMENTIN) 875-125 MG per tablet; Take 1 tablet by mouth Every 12 (Twelve) Hours for 7 days.    He has Stahist at home which he may also begin for postnasal drainage, congestion.

## 2018-09-21 ENCOUNTER — TELEPHONE (OUTPATIENT)
Dept: INTERNAL MEDICINE | Facility: CLINIC | Age: 58
End: 2018-09-21

## 2018-09-21 NOTE — TELEPHONE ENCOUNTER
----- Message from Tomasa Link sent at 9/21/2018  1:50 PM EDT -----  Contact: Patient  Patient saw Loulou yesterday for a URI and started coughing last night. Would like to know if you can call in Tessalon pearls. Please advise    Patient:[phone]    Pharmacy:[facility] [address] [place] [address] [place]  AT [facility] [address] - [phone]  - [phone] FX

## 2018-09-21 NOTE — TELEPHONE ENCOUNTER
Okay to call in Tessalon Perles 200mg I po q8 hours PRN #[id number] no refills. RTC if sx persist/worsen. Thanks.

## 2018-12-06 ENCOUNTER — APPOINTMENT (OUTPATIENT)
Dept: LAB | Facility: HOSPITAL | Age: 58
End: 2018-12-06

## 2018-12-06 DIAGNOSIS — D69.6 THROMBOCYTOPENIA (HCC): Primary | ICD-10-CM

## 2018-12-06 LAB
ALBUMIN SERPL-MCNC: 4.8 G/DL (ref 3.5–5.2)
ALBUMIN/GLOB SERPL: 2 G/DL (ref 1.1–2.4)
ALP SERPL-CCNC: 41 U/L (ref 38–116)
ALT SERPL W P-5'-P-CCNC: 16 U/L (ref 0–41)
ANION GAP SERPL CALCULATED.3IONS-SCNC: 10.9 MMOL/L
AST SERPL-CCNC: 21 U/L (ref 0–40)
BASOPHILS # BLD AUTO: 0.01 10*3/MM3 (ref 0–0.1)
BASOPHILS NFR BLD AUTO: 0.3 % (ref 0–1.1)
BILIRUB SERPL-MCNC: 0.4 MG/DL (ref 0.1–1.2)
BUN BLD-MCNC: 20 MG/DL (ref 6–20)
BUN/CREAT SERPL: 18.3 (ref 7.3–30)
CALCIUM SPEC-SCNC: 9.8 MG/DL (ref 8.5–10.2)
CHLORIDE SERPL-SCNC: 101 MMOL/L (ref 98–107)
CO2 SERPL-SCNC: 27.1 MMOL/L (ref 22–29)
CREAT BLD-MCNC: 1.09 MG/DL (ref 0.7–1.3)
DEPRECATED RDW RBC AUTO: 40.4 FL (ref 37–49)
EOSINOPHIL # BLD AUTO: 0.07 10*3/MM3 (ref 0–0.36)
EOSINOPHIL NFR BLD AUTO: 1.8 % (ref 1–5)
ERYTHROCYTE [DISTWIDTH] IN BLOOD BY AUTOMATED COUNT: 12.8 % (ref 11.7–14.5)
GFR SERPL CREATININE-BSD FRML MDRD: 69 ML/MIN/1.73
GLOBULIN UR ELPH-MCNC: 2.4 GM/DL (ref 1.8–3.5)
GLUCOSE BLD-MCNC: 97 MG/DL (ref 74–124)
HCT VFR BLD AUTO: 42.1 % (ref 40–49)
HGB BLD-MCNC: 14 G/DL (ref 13.5–16.5)
IMM GRANULOCYTES # BLD: 0.01 10*3/MM3 (ref 0–0.03)
IMM GRANULOCYTES NFR BLD: 0.3 % (ref 0–0.5)
LYMPHOCYTES # BLD AUTO: 1 10*3/MM3 (ref 1–3.5)
LYMPHOCYTES NFR BLD AUTO: 25.4 % (ref 20–49)
MCH RBC QN AUTO: 29.2 PG (ref 27–33)
MCHC RBC AUTO-ENTMCNC: 33.3 G/DL (ref 32–35)
MCV RBC AUTO: 87.7 FL (ref 83–97)
MONOCYTES # BLD AUTO: 0.21 10*3/MM3 (ref 0.25–0.8)
MONOCYTES NFR BLD AUTO: 5.3 % (ref 4–12)
NEUTROPHILS # BLD AUTO: 2.63 10*3/MM3 (ref 1.5–7)
NEUTROPHILS NFR BLD AUTO: 66.9 % (ref 39–75)
NRBC BLD MANUAL-RTO: 0 /100 WBC (ref 0–0)
PLATELET # BLD AUTO: 156 10*3/MM3 (ref 150–375)
PMV BLD AUTO: 9.6 FL (ref 8.9–12.1)
POTASSIUM BLD-SCNC: 4.6 MMOL/L (ref 3.5–4.7)
PROT SERPL-MCNC: 7.2 G/DL (ref 6.3–8)
RBC # BLD AUTO: 4.8 10*6/MM3 (ref 4.3–5.5)
SODIUM BLD-SCNC: 139 MMOL/L (ref 134–145)
WBC NRBC COR # BLD: 3.93 10*3/MM3 (ref 4–10)

## 2018-12-06 PROCEDURE — 85025 COMPLETE CBC W/AUTO DIFF WBC: CPT | Performed by: INTERNAL MEDICINE

## 2018-12-06 PROCEDURE — 80053 COMPREHEN METABOLIC PANEL: CPT | Performed by: INTERNAL MEDICINE

## 2018-12-06 PROCEDURE — 36415 COLL VENOUS BLD VENIPUNCTURE: CPT | Performed by: INTERNAL MEDICINE

## 2018-12-13 ENCOUNTER — APPOINTMENT (OUTPATIENT)
Dept: LAB | Facility: HOSPITAL | Age: 58
End: 2018-12-13

## 2018-12-13 ENCOUNTER — OFFICE VISIT (OUTPATIENT)
Dept: ONCOLOGY | Facility: CLINIC | Age: 58
End: 2018-12-13

## 2018-12-13 VITALS
DIASTOLIC BLOOD PRESSURE: 82 MMHG | SYSTOLIC BLOOD PRESSURE: 148 MMHG | RESPIRATION RATE: 16 BRPM | HEART RATE: 60 BPM | TEMPERATURE: 97.6 F | HEIGHT: 64 IN | BODY MASS INDEX: 25.47 KG/M2 | WEIGHT: 149.2 LBS | OXYGEN SATURATION: 99 %

## 2018-12-13 DIAGNOSIS — C91.41 HAIRY CELL LEUKEMIA, IN REMISSION (HCC): Primary | ICD-10-CM

## 2018-12-13 PROCEDURE — 99213 OFFICE O/P EST LOW 20 MIN: CPT | Performed by: INTERNAL MEDICINE

## 2018-12-13 PROCEDURE — G0463 HOSPITAL OUTPT CLINIC VISIT: HCPCS | Performed by: INTERNAL MEDICINE

## 2018-12-13 NOTE — PROGRESS NOTES
Subjective feels generally well    REASONS FOR FOLLOWUP:    1. Presentation with thrombocytopenia, mild leukopenia and splenomegaly.   2. Diagnosis hairy cell leukemia.   3. Status post hospitalization 02/06/2012-02/15/2012 per treatment with cladribine (2CdA x 7 days continuous IV infusion).   4. Evidence of CR noted 05/09/2012 with interval resolution of splenomegaly, notable on physical exam and peripheral blood smear.   5. Patien t with continued CR noted 09/05/2012, every 3 month assessment to 1 year anniversary planned, every 4 months 2nd year, every 6 months years 3-5.   6. Patient seen 03/05/2013 stable, with plans to move to every 4 month assessment. Patient proceeding through t reatment with Suboxone.   7. The patient was seen on 10/25/2013, stable hematologically and reassessment q.6 months planned.   8. The patient was seen 04/10/2014, status post recent apparent viral illness with normalization of peripheral blood counts and symptoms.   9. Patient seen 09/26/2014, stable - continued remission noted.   10. Patient seen on 03/17/2015, stable in continued remission, every 6 month assessment planned.                    11.  Patient was seen 10/13/2015 again in continued                                         remission, every 6 month CBC, planned with yearly                                   followup per MD.                    12.  Patient seen in office August 04, 2016, stable, no                                        evidence recurrent disease        13.  Patient reviewed December 04, 2017, continued remission                   14.  Patient seen December 13, 2018, continued remission    HISTORY OF PRESENT ILLNESS:  The patient is a 58 y.o. year old male who is here for follow-up with the above-mentioned history.    History of Present Illness     The patient is a 58-year-old male who has been previously healthy. He had some routine blood work performed recently with Dr. Sinclair on 01/17/2012 showing  significant thrombocytopenia with a platelet count of 79,000. His white count was low normal at 4700 with decreased granulocytes at 31% with lymphocytes elevated at 55%. His serum chemistries at the time were unremarkable including normal liver function tests. Mr. Morales had been feeling reasonably w ell although he reported some fatigue. He has had no fevers, chills, night sweats or unexplained weight loss. He has been on a weight loss program and following a low carb diet and has intentionally lost about 40 pounds in the last year or two. He had noted some easier bruising but had been taking aspirin and fish oil even prior to noticing his platelet count was low.   On his examination in the office 01/31/2012 the patient had significant splenomegaly with spleen tip palpable approximately 7-8 cm below the left costal margin. The spleen is not particularly tender and did not extend to midline. Peripheral smear showed large platelets and atypical lymphocytes with no blasts. Mr. Morales was assessed by flow cytometry peripheral blood showing androgeni c profile consistent with hairy cell leukemia. Bone marrow aspirate and biopsy also was confirmatory for hairy cell leukemia with normal chromosomal complement. 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 Additionally CT abdomen and pelvis demonstrated splenomegaly with spleen measuring 22 cm in craniocaudal extent up to 16.7 x 8 in the axial plane. No other abnormalities were present. After discussion the patient was advised per linda osborn with 2CdA. He was admitted 02/06/2015-02/15/2012 with a dual lumen PICC line placed. 2CdA began after initial hydration and continued over the subsequent 7 days by continuous IV infusion. He did relatively well except for weakness and fatigue. He ha d additional issues with constipation and exacerbation of chronic low back pain. He further had development of  neutropenia 02/12/2012 and was placed on Diflucan, Valtrex and Levaquin. These he also tolerated well. He also required transfusion just prior to discharge. Finally the patient was asked to return 02/16/2012 for Neulasta which he did return back to take in the office. He subsequently has done relatively well as an outpatient with white count increasing to 13,000 by 02/20/2012 and peaking at 20 , 000 by 02/24/2012. Platelet count also improved substantially peaking 02/23/2012 at 177,000. He has otherwise returned and we were able to remove his PICC line by 02/24/2012 and on 02/27/2012 continued resolution of his leukopenia, antiviral and antibac t erial medication discontinued. The patient now returns today feeling weak in the morning but otherwise doing well for the rest of the day without any fevers, chills, and resolution of night sweats. Appetite, weight and performance status have remained o verall excellent. He has an episode of gouty arthropathy involving his left great toe. He used Indocin briefly and then went back to allopurinol but is now having his gout return this morning.   The patient thereafter did use Indocin successful and thereafter restarted allopurinol. His gouty arthropathy responded appropriately and has not returned. Followup counts were requested and as he returns today Mr. Morales is feeling well. He has returne d to exercise and nearly to his full-time job. He still notes some degree of fatigue in the a.m. and by the early afternoon approximately 12:30-1:00 p.m. He is at work full-time. He has had no fever, chills, weight loss, night sweats, nausea or vomiting, c hange in bowel habit.   The patient was asked to return back for followup reassessment. CT scan 05/02/2012 per splenomegaly showed spleen to have regressed substantially in size. The AP in 1st dimension previously 16.7 x 8 is now 12.2 by 6.1. No other abno rmalities were present. Peripheral smear also shows no evidence of  recurrent disease. The patient feels well and has returned to normal activity.   The patient was asked to return for followup now seen 09/05/2012 doing well with normal activity again with considerable improvement in his general performance status.   The patient is now seen 03/05/2013 continuing to do well. It came to the attention however through a [facility] report that he is current on Suboxone through Dr. Kalia Davidson. In discussion with the patient he evidently had difficulty in trying to discontinue pain medications in the spring of 2012. He eventually saw Dr. Davidson who was hoping to wean him altogether from pain medications with the use of Suboxone. 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 He does this primarily through diet at present.   The patient thereafter was asked to return in followup. He is now seen on 10/25/2013 feeling well overall, continuing with exercise and dietary program. He feels well overall except for mild headache, approximately at 3:00-4:00 p.m. each day(?).   The patient thereafter was asked to be seen back 6 months later . He is now seen 09/26/2014 doing well continuing his exercise and dietary program to terrific effect. He has had no issues since last seen and in fact has normalized his hypertension as a result of his dietary program.   The patient thereafter was asked to be seen 6 months later. He continues to do his exercise and dietary program to wonderful effect. He has had no additional issues since last seen.   The patient is now seen back 6 months later, 10/13/2015, and fortunately does continue to do amazingly well. He has maintained his exercise and dietary program, again to excellent and continued significant effect.   Patient now reviewed August 04, 2016.  He continues to feel well except for periodic fatigue.    The patient is  reviewed December 04, 2017.  Hematologically he is stable and remains in remission.  Plans are to eventually discontinue Suboxone as well.    Patient is next seen December 13, 2018.  He continues to exercise regularly and feels generally good though has been concerned about possibly splenic discomfort.  He also describes that his mother is been diagnosed with neuroendocrine carcinoma involving the bowel and is undergoing treatment up to and including immunotherapy.    Past Medical History:   Diagnosis Date   • Cancer (CMS/HCC)     hairy cell leukemia   • Coronary artery disease     minimal with some minimal narrowing of 1 vessel   • ED (erectile dysfunction) of organic origin    • H/O vitamin D deficiency    • Hypertension    • Thrombocytopenia (CMS/HCC)        ONCOLOGIC HISTORY:  (History from previous dates can be found in the separate document.)    Current Outpatient Medications on File Prior to Visit   Medication Sig Dispense Refill   • albuterol (PROVENTIL HFA;VENTOLIN HFA) 108 (90 Base) MCG/ACT inhaler Inhale 2 puffs Every 4 (Four) Hours As Needed for Wheezing. 1 inhaler 3   • buprenorphine-naloxone (SUBOXONE) 8-2 MG per SL tablet TAKE 2 TABLETS UNDER TONGUE EVERY MORNING CS EXPRESS SC HL  0   • Chlorcyclizine-Pseudoephed (STAHIST AD) 25-60 MG tablet One tablet three times a day as needed for congestion 30 tablet 0   • indomethacin (INDOCIN) 25 MG capsule Take 25 mg by mouth 3 (three) times a day as needed for mild pain (1-3).     • sildenafil (REVATIO) 20 MG tablet Take 1-5 tablets by mouth 3 (Three) Times a Day. 450 tablet 11   • vardenafil (LEVITRA) 20 MG tablet Take 1 tablet by mouth As Needed for erectile dysfunction. 20 tablet 3     No current facility-administered medications on file prior to visit.        ALLERGIES:     Allergies   Allergen Reactions   • Ciprofloxacin Rash       Social History     Socioeconomic History   • Marital status:      Spouse name: Radha   • Number of children: Not on  file   • Years of education: Not on file   • Highest education level: Not on file   Social Needs   • Financial resource strain: Not on file   • Food insecurity - worry: Not on file   • Food insecurity - inability: Not on file   • Transportation needs - medical: Not on file   • Transportation needs - non-medical: Not on file   Occupational History   • Occupation:      Employer: [employer]   Tobacco Use   • Smoking status: Never Smoker   • Smokeless tobacco: Never Used   Substance and Sexual Activity   • Alcohol use: Yes     Alcohol/week: 1.2 oz     Types: 2 Cans of beer per week     Comment: social   • Drug use: Not on file   • Sexual activity: Not on file   Other Topics Concern   • Not on file   Social History Narrative   • Not on file         Cancer-related family history includes Colon cancer in his mother.     Review of Systems   Constitutional: Negative for fatigue.   Respiratory: Negative for chest tightness and shortness of breath.    Gastrointestinal: Negative for constipation, diarrhea, nausea and vomiting.   Neurological: Negative for weakness.     A comprehensive 14 point review of systems was performed and was negative except as mentioned.    Objective      There were no vitals filed for this visit.  Current Status 12/4/2017   ECOG score 0       Physical Exam    GENERAL: Well-developed, well-nourished  male in no acute distress.   SKIN: Warm, dry without rashes, purpura or petechiae.   HEAD: Normocephalic.   EYES: Pupils equal, round and reactive to light. EOMs intact. Conjunctivae normal.   EARS: Hearing intact.   NOSE: Septum midline. No excoriations or nasal discharge.   MOUTH: Tongue is well-papillated; no stomatitis or ulcers. Lips normal.   THROAT: Oropharynx without lesions or exudates.   NECK: Supple with good range of motion; no thyromegaly or masses, no JVD or bruits.   LYMPHATICS: No cervical, supraclavicular, axillary or inguinal adenopathy.   CHEST: Lungs  clear to percussion and auscultation.   CARDIAC: Regular rate and rhythm without murmurs, rubs or gallops.   ABDOMEN: Soft, nontender with no organomegaly or masses.   EXTREMITIES: No clubbing, cyanosis or edema.   NEUROLOGICAL: No focal neurological deficits.    RECENT LABS:  Hematology WBC   Date Value Ref Range Status   12/06/2018 3.93 (L) 4.00 - 10.00 10*3/mm3 Final     RBC   Date Value Ref Range Status   12/06/2018 4.80 4.30 - 5.50 10*6/mm3 Final     Hemoglobin   Date Value Ref Range Status   12/06/2018 14.0 13.5 - 16.5 g/dL Final     Hematocrit   Date Value Ref Range Status   12/06/2018 42.1 40.0 - 49.0 % Final     Platelets   Date Value Ref Range Status   12/06/2018 156 150 - 375 10*3/mm3 Final        Assessment/Plan       A 58-year-old male with a history of hairy cell leukemia. He presented with leukopenia, thrombocytopenia, and splenomegaly. After diagnosis was confirmed he was treated with cladribine 02/06/2012-02/15/2012 by continuous IV infusion. ALLYSON puentes developed cytopenias, but again not to a serious degree and ultimately exam in late February 2012 was negative for evidence of residual and flow cytometric assessment. Subsequent physical examination showed resolution of splenomegaly and normalization o f performance status. Followup CT also normalized as well per splenomegaly. The patient has been followed since and when seen in March 2013 and October 2013, was felt to be in CR. He had been seen just after recent viral illness and though he was feeling poorly, symptoms went on to improve and returned to baseline status. At 6 month review, the patient continues in remission. This further vindicates and since last visit, he has had no additional urinary symptoms either.   At this point we find no evidence of recurrent disease and again feel that Mr. Morales remains in complete remission. This has been the case in 03/17/2015 and again is notable 10/13/2015 as well as April 2016, August 2016 and December 2017  .  Patient is next seen December 13, 2018 and doing well.  There is no evidence of recurrent disease.                                                                             We will plan:   1. To have a CBC check every 6 months and seen by the physician yearly.

## 2019-02-04 RX ORDER — VARDENAFIL HYDROCHLORIDE 20 MG/1
20 TABLET ORAL AS NEEDED
Qty: 20 TABLET | Refills: 3 | Status: SHIPPED | OUTPATIENT
Start: 2019-02-04 | End: 2019-02-07 | Stop reason: SDUPTHER

## 2019-02-05 ENCOUNTER — TELEPHONE (OUTPATIENT)
Dept: INTERNAL MEDICINE | Facility: CLINIC | Age: 59
End: 2019-02-05

## 2019-02-05 NOTE — TELEPHONE ENCOUNTER
----- Message from Tomasa Link sent at 2/5/2019 11:03 AM EST -----  Contact: [facility]  [facility] calling to verify patients prescription    vardenafil (LEVITRA) 20 MG tablet    States he was getting sildenafil (REVATIO) 20 MG tablet. Please advise    Pharmacy:[phone]

## 2019-02-06 ENCOUNTER — TELEPHONE (OUTPATIENT)
Dept: INTERNAL MEDICINE | Facility: CLINIC | Age: 59
End: 2019-02-06

## 2019-02-06 RX ORDER — SILDENAFIL CITRATE 20 MG/1
TABLET ORAL
Qty: 10 TABLET | Refills: 5 | Status: SHIPPED | OUTPATIENT
Start: 2019-02-06 | End: 2021-01-08 | Stop reason: SDUPTHER

## 2019-02-06 NOTE — TELEPHONE ENCOUNTER
Pharm is needing clarification on dosage.  They are requesting a verbal over the phone to pharmacist.    Caller [phone] Femi

## 2019-02-07 ENCOUNTER — OFFICE VISIT (OUTPATIENT)
Dept: INTERNAL MEDICINE | Facility: CLINIC | Age: 59
End: 2019-02-07

## 2019-02-07 VITALS
SYSTOLIC BLOOD PRESSURE: 128 MMHG | DIASTOLIC BLOOD PRESSURE: 80 MMHG | HEIGHT: 64 IN | BODY MASS INDEX: 25.61 KG/M2 | WEIGHT: 150 LBS

## 2019-02-07 DIAGNOSIS — N52.01 ERECTILE DYSFUNCTION DUE TO ARTERIAL INSUFFICIENCY: ICD-10-CM

## 2019-02-07 DIAGNOSIS — Z79.899 ENCOUNTER FOR MONITORING SUBOXONE MAINTENANCE THERAPY: Chronic | ICD-10-CM

## 2019-02-07 DIAGNOSIS — Z51.81 ENCOUNTER FOR MONITORING SUBOXONE MAINTENANCE THERAPY: Chronic | ICD-10-CM

## 2019-02-07 DIAGNOSIS — C91.41 HAIRY CELL LEUKEMIA, IN REMISSION (HCC): ICD-10-CM

## 2019-02-07 DIAGNOSIS — E55.9 VITAMIN D DEFICIENCY: Primary | ICD-10-CM

## 2019-02-07 DIAGNOSIS — D69.6 THROMBOCYTOPENIA (HCC): Chronic | ICD-10-CM

## 2019-02-07 PROCEDURE — 99214 OFFICE O/P EST MOD 30 MIN: CPT | Performed by: INTERNAL MEDICINE

## 2019-02-07 RX ORDER — VARDENAFIL HYDROCHLORIDE 20 MG/1
20 TABLET ORAL AS NEEDED
Qty: 20 TABLET | Refills: 3 | Status: SHIPPED | OUTPATIENT
Start: 2019-02-07 | End: 2019-08-19 | Stop reason: SDUPTHER

## 2019-02-07 NOTE — PROGRESS NOTES
Subjective   Solo Morales is a 58 y.o. male. with a chief complaint of recovering from hairy cell leukemia (currently in remission), on suboxone therapy (doing extremely well), thrombocytopenia (stable at 156K), ED (well compensated), here for follow up. He is doing well but he needs the shingryx shot.    History of Present Illness all of his current issues are stable and well controlled    The following portions of the patient's history were reviewed and updated as appropriate: allergies, current medications, past family history, past medical history, past social history, past surgical history and problem list.    Review of Systems   Constitutional: Negative.    HENT: Negative.    Eyes: Negative.    Respiratory: Negative.    Cardiovascular: Negative.    Gastrointestinal: Negative.    Endocrine: Negative.    Genitourinary: Positive for erectile dysfunction.   Musculoskeletal: Negative.    Skin: Negative.    Allergic/Immunologic: Negative.    Neurological: Negative.    Hematological: Negative.    Psychiatric/Behavioral: Negative.        Objective   Physical Exam   Constitutional: He is oriented to person, place, and time. He appears well-developed and well-nourished.   HENT:   Head: Normocephalic and atraumatic.   Eyes: EOM are normal. Pupils are equal, round, and reactive to light.   Neck: Normal range of motion. Neck supple.   Cardiovascular: Normal rate, regular rhythm and normal heart sounds.   Pulmonary/Chest: Effort normal and breath sounds normal.   Abdominal: Soft. Bowel sounds are normal.   Musculoskeletal: Normal range of motion.   Mild low back pain   Neurological: He is alert and oriented to person, place, and time.   Skin: Skin is warm and dry.   Psychiatric: He has a normal mood and affect. His behavior is normal. Judgment and thought content normal.   Nursing note and vitals reviewed.        Assessment/Plan   Solo was seen today for follow-up.    Diagnoses and all orders for this visit:    Vitamin D  deficiency  Comments:  continue vitamin D therapy    Erectile dysfunction due to arterial insufficiency  Comments:  continue current therapy    Hairy cell leukemia, in remission (CMS/HCC)  Comments:  just checked with his hematologist    Thrombocytopenia (CMS/HCC)  Comments:  stable for now    Encounter for monitoring Suboxone maintenance therapy  Comments:  doing well with his program

## 2019-04-11 VITALS
DIASTOLIC BLOOD PRESSURE: 78 MMHG | TEMPERATURE: 97.5 F | OXYGEN SATURATION: 98 % | HEART RATE: 58 BPM | DIASTOLIC BLOOD PRESSURE: 51 MMHG | SYSTOLIC BLOOD PRESSURE: 150 MMHG | SYSTOLIC BLOOD PRESSURE: 145 MMHG | OXYGEN SATURATION: 95 % | OXYGEN SATURATION: 97 % | DIASTOLIC BLOOD PRESSURE: 70 MMHG | RESPIRATION RATE: 15 BRPM | SYSTOLIC BLOOD PRESSURE: 140 MMHG | HEART RATE: 57 BPM | DIASTOLIC BLOOD PRESSURE: 61 MMHG | RESPIRATION RATE: 19 BRPM | HEART RATE: 54 BPM | HEART RATE: 45 BPM | DIASTOLIC BLOOD PRESSURE: 72 MMHG | DIASTOLIC BLOOD PRESSURE: 57 MMHG | SYSTOLIC BLOOD PRESSURE: 113 MMHG | RESPIRATION RATE: 16 BRPM | DIASTOLIC BLOOD PRESSURE: 62 MMHG | SYSTOLIC BLOOD PRESSURE: 91 MMHG | DIASTOLIC BLOOD PRESSURE: 73 MMHG | HEART RATE: 56 BPM | HEART RATE: 52 BPM | SYSTOLIC BLOOD PRESSURE: 109 MMHG | SYSTOLIC BLOOD PRESSURE: 130 MMHG | RESPIRATION RATE: 20 BRPM | SYSTOLIC BLOOD PRESSURE: 122 MMHG | DIASTOLIC BLOOD PRESSURE: 60 MMHG | SYSTOLIC BLOOD PRESSURE: 101 MMHG | OXYGEN SATURATION: 100 % | SYSTOLIC BLOOD PRESSURE: 102 MMHG | HEART RATE: 44 BPM | WEIGHT: 145 LBS | HEIGHT: 64 IN | RESPIRATION RATE: 14 BRPM | HEART RATE: 53 BPM | OXYGEN SATURATION: 99 % | SYSTOLIC BLOOD PRESSURE: 103 MMHG | DIASTOLIC BLOOD PRESSURE: 71 MMHG | TEMPERATURE: 97.6 F

## 2019-04-15 ENCOUNTER — OFFICE (AMBULATORY)
Dept: URBAN - METROPOLITAN AREA PATHOLOGY 4 | Facility: PATHOLOGY | Age: 59
End: 2019-04-15
Payer: COMMERCIAL

## 2019-04-15 ENCOUNTER — AMBULATORY SURGICAL CENTER (AMBULATORY)
Dept: URBAN - METROPOLITAN AREA SURGERY 17 | Facility: SURGERY | Age: 59
End: 2019-04-15
Payer: COMMERCIAL

## 2019-04-15 DIAGNOSIS — Z86.010 PERSONAL HISTORY OF COLONIC POLYPS: ICD-10-CM

## 2019-04-15 DIAGNOSIS — D12.8 BENIGN NEOPLASM OF RECTUM: ICD-10-CM

## 2019-04-15 DIAGNOSIS — Z12.11 ENCOUNTER FOR SCREENING FOR MALIGNANT NEOPLASM OF COLON: ICD-10-CM

## 2019-04-15 DIAGNOSIS — K62.1 RECTAL POLYP: ICD-10-CM

## 2019-04-15 LAB
GI HISTOLOGY: A. UNSPECIFIED: (no result)
GI HISTOLOGY: PDF REPORT: (no result)

## 2019-04-15 PROCEDURE — 45385 COLONOSCOPY W/LESION REMOVAL: CPT | Mod: 33 | Performed by: INTERNAL MEDICINE

## 2019-04-15 PROCEDURE — 88305 TISSUE EXAM BY PATHOLOGIST: CPT | Mod: 33 | Performed by: INTERNAL MEDICINE

## 2019-05-16 ENCOUNTER — APPOINTMENT (OUTPATIENT)
Dept: ONCOLOGY | Facility: HOSPITAL | Age: 59
End: 2019-05-16

## 2019-05-16 ENCOUNTER — APPOINTMENT (OUTPATIENT)
Dept: LAB | Facility: HOSPITAL | Age: 59
End: 2019-05-16

## 2019-05-20 ENCOUNTER — CLINICAL SUPPORT (OUTPATIENT)
Dept: ONCOLOGY | Facility: HOSPITAL | Age: 59
End: 2019-05-20

## 2019-05-20 ENCOUNTER — LAB (OUTPATIENT)
Dept: LAB | Facility: HOSPITAL | Age: 59
End: 2019-05-20

## 2019-05-20 DIAGNOSIS — D69.6 THROMBOCYTOPENIA (HCC): Primary | ICD-10-CM

## 2019-05-20 LAB
BASOPHILS # BLD AUTO: 0.02 10*3/MM3 (ref 0–0.2)
BASOPHILS NFR BLD AUTO: 0.5 % (ref 0–1.5)
DEPRECATED RDW RBC AUTO: 38.9 FL (ref 37–54)
EOSINOPHIL # BLD AUTO: 0.11 10*3/MM3 (ref 0–0.4)
EOSINOPHIL NFR BLD AUTO: 2.6 % (ref 0.3–6.2)
ERYTHROCYTE [DISTWIDTH] IN BLOOD BY AUTOMATED COUNT: 12.7 % (ref 12.3–15.4)
HCT VFR BLD AUTO: 39.8 % (ref 37.5–51)
HGB BLD-MCNC: 13.8 G/DL (ref 13–17.7)
IMM GRANULOCYTES # BLD AUTO: 0.02 10*3/MM3 (ref 0–0.05)
IMM GRANULOCYTES NFR BLD AUTO: 0.5 % (ref 0–0.5)
LYMPHOCYTES # BLD AUTO: 0.81 10*3/MM3 (ref 0.7–3.1)
LYMPHOCYTES NFR BLD AUTO: 19.3 % (ref 19.6–45.3)
MCH RBC QN AUTO: 29.5 PG (ref 26.6–33)
MCHC RBC AUTO-ENTMCNC: 34.7 G/DL (ref 31.5–35.7)
MCV RBC AUTO: 85 FL (ref 79–97)
MONOCYTES # BLD AUTO: 0.18 10*3/MM3 (ref 0.1–0.9)
MONOCYTES NFR BLD AUTO: 4.3 % (ref 5–12)
NEUTROPHILS # BLD AUTO: 3.05 10*3/MM3 (ref 1.7–7)
NEUTROPHILS NFR BLD AUTO: 72.8 % (ref 42.7–76)
NRBC BLD AUTO-RTO: 0 /100 WBC (ref 0–0.2)
PLATELET # BLD AUTO: 128 10*3/MM3 (ref 140–450)
PMV BLD AUTO: 10.6 FL (ref 6–12)
RBC # BLD AUTO: 4.68 10*6/MM3 (ref 4.14–5.8)
WBC NRBC COR # BLD: 4.19 10*3/MM3 (ref 3.4–10.8)

## 2019-05-20 PROCEDURE — 85025 COMPLETE CBC W/AUTO DIFF WBC: CPT | Performed by: INTERNAL MEDICINE

## 2019-05-20 PROCEDURE — 36415 COLL VENOUS BLD VENIPUNCTURE: CPT | Performed by: INTERNAL MEDICINE

## 2019-05-20 NOTE — PROGRESS NOTES
Pt is here for lab with RN review.  CBC reviewed with pt, counts are stable for this pt at this time. Pt has no complaints. He is concerned about the slight drop in platelets. R/w Dr. Morrow, no new orders received. Copy of labs given to pt and f/u appt reviewed. Pt is instructed to call with concerns prior to next visit.    Lab Results   Component Value Date    WBC 4.19 05/20/2019    HGB 13.8 05/20/2019    HCT 39.8 05/20/2019    MCV 85.0 05/20/2019     (L) 05/20/2019

## 2019-08-08 ENCOUNTER — OFFICE VISIT (OUTPATIENT)
Dept: INTERNAL MEDICINE | Facility: CLINIC | Age: 59
End: 2019-08-08

## 2019-08-08 VITALS
SYSTOLIC BLOOD PRESSURE: 142 MMHG | BODY MASS INDEX: 24.92 KG/M2 | HEART RATE: 57 BPM | OXYGEN SATURATION: 99 % | HEIGHT: 64 IN | WEIGHT: 146 LBS | DIASTOLIC BLOOD PRESSURE: 90 MMHG

## 2019-08-08 DIAGNOSIS — Z23 NEED FOR VACCINATION: Primary | ICD-10-CM

## 2019-08-08 DIAGNOSIS — D69.6 THROMBOCYTOPENIA (HCC): Chronic | ICD-10-CM

## 2019-08-08 DIAGNOSIS — J30.1 SEASONAL ALLERGIC RHINITIS DUE TO POLLEN: Chronic | ICD-10-CM

## 2019-08-08 DIAGNOSIS — N52.01 ERECTILE DYSFUNCTION DUE TO ARTERIAL INSUFFICIENCY: ICD-10-CM

## 2019-08-08 DIAGNOSIS — C91.41 HAIRY CELL LEUKEMIA, IN REMISSION (HCC): ICD-10-CM

## 2019-08-08 DIAGNOSIS — E55.9 VITAMIN D DEFICIENCY: ICD-10-CM

## 2019-08-08 LAB
ALBUMIN SERPL-MCNC: 4.9 G/DL (ref 3.5–5.2)
ALBUMIN/GLOB SERPL: 2.1 G/DL
ALP SERPL-CCNC: 38 U/L (ref 39–117)
ALT SERPL W P-5'-P-CCNC: 21 U/L (ref 1–41)
ANION GAP SERPL CALCULATED.3IONS-SCNC: 8.7 MMOL/L (ref 5–15)
AST SERPL-CCNC: 19 U/L (ref 1–40)
BASOPHILS # BLD AUTO: 0.01 10*3/MM3 (ref 0–0.2)
BASOPHILS NFR BLD AUTO: 0.3 % (ref 0–1.5)
BILIRUB SERPL-MCNC: 0.8 MG/DL (ref 0.2–1.2)
BUN BLD-MCNC: 18 MG/DL (ref 8–23)
BUN/CREAT SERPL: 20.7 (ref 7–25)
CALCIUM SPEC-SCNC: 9.8 MG/DL (ref 8.6–10.5)
CHLORIDE SERPL-SCNC: 105 MMOL/L (ref 98–107)
CO2 SERPL-SCNC: 29.3 MMOL/L (ref 22–29)
CREAT BLD-MCNC: 0.87 MG/DL (ref 0.76–1.27)
DEPRECATED RDW RBC AUTO: 39.9 FL (ref 37–54)
EOSINOPHIL # BLD AUTO: 0.09 10*3/MM3 (ref 0–0.4)
EOSINOPHIL NFR BLD AUTO: 2.3 % (ref 0.3–6.2)
ERYTHROCYTE [DISTWIDTH] IN BLOOD BY AUTOMATED COUNT: 12.7 % (ref 12.3–15.4)
GFR SERPL CREATININE-BSD FRML MDRD: 90 ML/MIN/1.73
GLOBULIN UR ELPH-MCNC: 2.3 GM/DL
GLUCOSE BLD-MCNC: 106 MG/DL (ref 65–99)
HCT VFR BLD AUTO: 40.7 % (ref 37.5–51)
HGB BLD-MCNC: 13.7 G/DL (ref 13–17.7)
LYMPHOCYTES # BLD AUTO: 0.91 10*3/MM3 (ref 0.7–3.1)
LYMPHOCYTES NFR BLD AUTO: 23.4 % (ref 19.6–45.3)
MCH RBC QN AUTO: 29.4 PG (ref 26.6–33)
MCHC RBC AUTO-ENTMCNC: 33.7 G/DL (ref 31.5–35.7)
MCV RBC AUTO: 87.3 FL (ref 79–97)
MONOCYTES # BLD AUTO: 0.23 10*3/MM3 (ref 0.1–0.9)
MONOCYTES NFR BLD AUTO: 5.9 % (ref 5–12)
NEUTROPHILS # BLD AUTO: 2.65 10*3/MM3 (ref 1.7–7)
NEUTROPHILS NFR BLD AUTO: 68.1 % (ref 42.7–76)
PLATELET # BLD AUTO: 148 10*3/MM3 (ref 140–450)
PMV BLD AUTO: 10.9 FL (ref 6–12)
POTASSIUM BLD-SCNC: 4.9 MMOL/L (ref 3.5–5.2)
PROT SERPL-MCNC: 7.2 G/DL (ref 6–8.5)
RBC # BLD AUTO: 4.66 10*6/MM3 (ref 4.14–5.8)
SODIUM BLD-SCNC: 143 MMOL/L (ref 136–145)
WBC NRBC COR # BLD: 3.89 10*3/MM3 (ref 3.4–10.8)

## 2019-08-08 PROCEDURE — 80053 COMPREHEN METABOLIC PANEL: CPT | Performed by: INTERNAL MEDICINE

## 2019-08-08 PROCEDURE — 85025 COMPLETE CBC W/AUTO DIFF WBC: CPT | Performed by: INTERNAL MEDICINE

## 2019-08-08 PROCEDURE — 90750 HZV VACC RECOMBINANT IM: CPT | Performed by: INTERNAL MEDICINE

## 2019-08-08 PROCEDURE — 36415 COLL VENOUS BLD VENIPUNCTURE: CPT | Performed by: INTERNAL MEDICINE

## 2019-08-08 PROCEDURE — 99214 OFFICE O/P EST MOD 30 MIN: CPT | Performed by: INTERNAL MEDICINE

## 2019-08-08 PROCEDURE — 90471 IMMUNIZATION ADMIN: CPT | Performed by: INTERNAL MEDICINE

## 2019-08-08 NOTE — PROGRESS NOTES
Subjective   Solo Morales is a 59 y.o. male.  Presents with a chief complaint of status post hairy cell leukemia treatment with chemotherapy, episodic thrombocytopenia, currently on Suboxone maintenance therapy, erectile dysfunction that is well compensated, and a vitamin D deficiency for which the patient takes daily vitamin D.  The patient is in excellent health exercises on a regular basis and follows all of his medical regimens to the letter.  Today I am going to check a CMP and a CBC.  The patient follows up with his hematologist every 6 months and is doing extremely well.    History of Present Illness follow up on his Hairy Cell leukemia    The following portions of the patient's history were reviewed and updated as appropriate: allergies, current medications, past family history, past medical history, past social history, past surgical history and problem list.    Review of Systems   Constitutional: Negative.    HENT: Negative.    Eyes: Negative.    Respiratory: Negative.    Cardiovascular: Negative.    Gastrointestinal: Negative.    Endocrine: Negative.    Genitourinary: Negative.    Musculoskeletal: Negative.    Skin: Negative.    Allergic/Immunologic: Negative.    Neurological: Negative.    Hematological: Negative.    Psychiatric/Behavioral: Negative.        Objective   Physical Exam   Constitutional: He appears well-developed and well-nourished.   HENT:   Head: Normocephalic and atraumatic.   Eyes: EOM are normal. Pupils are equal, round, and reactive to light.   Neck: Normal range of motion. Neck supple.   Cardiovascular: Normal rate, regular rhythm and normal heart sounds.   Pulmonary/Chest: Effort normal and breath sounds normal.   Abdominal: Soft. Bowel sounds are normal.   Musculoskeletal: Normal range of motion.   Neurological: He is alert.   Skin: Skin is warm.   Psychiatric: He has a normal mood and affect.   Nursing note and vitals reviewed.        Assessment/Plan   Solo was seen today for hairy  cell leukemia and erectile dysfunction.    Diagnoses and all orders for this visit:    Hairy cell leukemia, in remission (CMS/HCC)  Comments:  check a CBC  Orders:  -     Comprehensive Metabolic Panel; Future  -     CBC & Differential; Future    Thrombocytopenia (CMS/HCC)  Comments:  check a CBC    Erectile dysfunction due to arterial insufficiency  Comments:  well compensated    Vitamin D deficiency  Comments:  check a vitamin D level    Seasonal allergic rhinitis due to pollen  Comments:  doing well overall

## 2019-08-19 RX ORDER — VARDENAFIL HYDROCHLORIDE 20 MG/1
20 TABLET ORAL AS NEEDED
Qty: 20 TABLET | Refills: 3 | Status: SHIPPED | OUTPATIENT
Start: 2019-08-19 | End: 2020-12-02

## 2019-12-13 ENCOUNTER — OFFICE VISIT (OUTPATIENT)
Dept: INTERNAL MEDICINE | Facility: CLINIC | Age: 59
End: 2019-12-13

## 2019-12-13 VITALS
SYSTOLIC BLOOD PRESSURE: 122 MMHG | WEIGHT: 153 LBS | BODY MASS INDEX: 26.12 KG/M2 | DIASTOLIC BLOOD PRESSURE: 80 MMHG | OXYGEN SATURATION: 98 % | TEMPERATURE: 98.2 F | HEIGHT: 64 IN | HEART RATE: 70 BPM

## 2019-12-13 DIAGNOSIS — R68.89 FLU-LIKE SYMPTOMS: ICD-10-CM

## 2019-12-13 DIAGNOSIS — J06.9 ACUTE URI: Primary | ICD-10-CM

## 2019-12-13 LAB
EXPIRATION DATE: NORMAL
FLUAV AG NPH QL: NEGATIVE
FLUBV AG NPH QL: NEGATIVE
INTERNAL CONTROL: NORMAL
Lab: NORMAL

## 2019-12-13 PROCEDURE — 87804 INFLUENZA ASSAY W/OPTIC: CPT | Performed by: NURSE PRACTITIONER

## 2019-12-13 PROCEDURE — 99213 OFFICE O/P EST LOW 20 MIN: CPT | Performed by: NURSE PRACTITIONER

## 2019-12-13 RX ORDER — GUAIFENESIN 600 MG/1
600 TABLET, EXTENDED RELEASE ORAL EVERY 12 HOURS SCHEDULED
Qty: 14 TABLET
Start: 2019-12-13 | End: 2019-12-24 | Stop reason: HOSPADM

## 2019-12-13 RX ORDER — AMOXICILLIN AND CLAVULANATE POTASSIUM 875; 125 MG/1; MG/1
1 TABLET, FILM COATED ORAL EVERY 12 HOURS SCHEDULED
Qty: 14 TABLET | Refills: 0 | Status: SHIPPED | OUTPATIENT
Start: 2019-12-13 | End: 2019-12-24 | Stop reason: HOSPADM

## 2019-12-13 RX ORDER — LORATADINE 10 MG/1
10 TABLET ORAL DAILY
Qty: 10 TABLET
Start: 2019-12-13 | End: 2019-12-19

## 2019-12-14 NOTE — PROGRESS NOTES
Subjective   Solo Morales is a 59 y.o. male who presents due to respiratory symptoms.    URI    This is a new problem. The current episode started in the past 7 days. The problem has been rapidly worsening. There has been no fever. Associated symptoms include congestion, coughing, headaches, rhinorrhea, sneezing and a sore throat. Pertinent negatives include no abdominal pain, chest pain, diarrhea, dysuria, ear pain, nausea, vomiting or wheezing. He has tried antihistamine for the symptoms. The treatment provided mild relief.   Fever    Associated symptoms include congestion, coughing, headaches and a sore throat. Pertinent negatives include no abdominal pain, chest pain, diarrhea, ear pain, nausea, urinary pain, vomiting or wheezing.   Flu Symptoms   Associated symptoms include congestion, coughing, fatigue, a fever, headaches and a sore throat. Pertinent negatives include no abdominal pain, chest pain, chills, joint swelling, nausea or vomiting.        The following portions of the patient's history were reviewed and updated as appropriate: allergies, current medications, past social history and problem list.    Past Medical History:   Diagnosis Date   • Cancer (CMS/HCC)     hairy cell leukemia   • Coronary artery disease     minimal with some minimal narrowing of 1 vessel   • ED (erectile dysfunction) of organic origin    • H/O vitamin D deficiency    • Hypertension    • Thrombocytopenia (CMS/HCC)          Current Outpatient Medications:   •  albuterol (PROVENTIL HFA;VENTOLIN HFA) 108 (90 Base) MCG/ACT inhaler, Inhale 2 puffs Every 4 (Four) Hours As Needed for Wheezing., Disp: 1 inhaler, Rfl: 3  •  buprenorphine-naloxone (SUBOXONE) 8-2 MG per SL tablet, TAKE 2 TABLETS UNDER TONGUE EVERY MORNING CS EXPRESS SC HL, Disp: , Rfl: 0  •  Chlorcyclizine-Pseudoephed (STAHIST AD) 25-60 MG tablet, One tablet three times a day as needed for congestion, Disp: 30 tablet, Rfl: 0  •  indomethacin (INDOCIN) 25 MG capsule, Take  "25 mg by mouth 3 (three) times a day as needed for mild pain (1-3)., Disp: , Rfl:   •  sildenafil (REVATIO) 20 MG tablet, USE 1 SUGAR AS DIRECTED AS NEEDED, Disp: 10 tablet, Rfl: 5  •  vardenafil (LEVITRA) 20 MG tablet, TAKE 1 TABLET BY MOUTH AS NEEDED FOR ERECTILE DYSFUNCTION, Disp: 20 tablet, Rfl: 3  •  amoxicillin-clavulanate (AUGMENTIN) 875-125 MG per tablet, Take 1 tablet by mouth Every 12 (Twelve) Hours for 7 days., Disp: 14 tablet, Rfl: 0  •  guaiFENesin (MUCINEX) 600 MG 12 hr tablet, Take 1 tablet by mouth Every 12 (Twelve) Hours for 7 days., Disp: 14 tablet, Rfl:   •  loratadine (CLARITIN) 10 MG tablet, Take 1 tablet by mouth Daily for 10 days., Disp: 10 tablet, Rfl:     Allergies   Allergen Reactions   • Ciprofloxacin Rash       Review of Systems   Constitutional: Positive for fatigue and fever. Negative for activity change, appetite change, chills and unexpected weight change.   HENT: Positive for congestion, postnasal drip, rhinorrhea, sinus pressure, sneezing and sore throat. Negative for drooling, ear pain, facial swelling, hearing loss, mouth sores, nosebleeds, trouble swallowing and voice change.    Eyes: Negative for pain, discharge, itching and visual disturbance.   Respiratory: Positive for cough and chest tightness. Negative for choking, shortness of breath and wheezing.    Cardiovascular: Negative for chest pain and palpitations.   Gastrointestinal: Negative for abdominal pain, constipation, diarrhea, nausea and vomiting.   Endocrine: Negative for polyuria.   Genitourinary: Negative for dysuria and frequency.   Musculoskeletal: Negative for back pain and joint swelling.   Neurological: Positive for headaches.       Objective   Vitals:    12/13/19 1007   BP: 122/80   BP Location: Left arm   Patient Position: Sitting   Cuff Size: Adult   Pulse: 70   Temp: 98.2 °F (36.8 °C)   TempSrc: Oral   SpO2: 98%   Weight: 69.4 kg (153 lb)   Height: 161.3 cm (63.5\")     Body mass index is 26.68 " kg/m².  Physical Exam   Constitutional: He appears well-developed and well-nourished. He is cooperative. He does not have a sickly appearance. He does not appear ill.   HENT:   Head: Normocephalic.   Right Ear: Hearing, tympanic membrane and external ear normal. No drainage, swelling or tenderness. Tympanic membrane is not injected, not erythematous and not bulging. No middle ear effusion.   Left Ear: Hearing, tympanic membrane and external ear normal. No drainage, swelling or tenderness. Tympanic membrane is not injected, not erythematous and not bulging.  No middle ear effusion.   Nose: No mucosal edema, rhinorrhea or sinus tenderness. Right sinus exhibits frontal sinus tenderness. Right sinus exhibits no maxillary sinus tenderness. Left sinus exhibits frontal sinus tenderness. Left sinus exhibits no maxillary sinus tenderness.   Mouth/Throat: Mucous membranes are normal. Posterior oropharyngeal erythema present.   Eyes: Conjunctivae and lids are normal. Right eye exhibits no discharge and no exudate. Left eye exhibits no discharge and no exudate.   Neck: Trachea normal and normal range of motion. Edema present.   Cardiovascular: Regular rhythm, normal heart sounds and normal pulses.   No murmur heard.  Pulmonary/Chest: Breath sounds normal. No respiratory distress. He has no decreased breath sounds. He has no wheezes. He has no rhonchi. He has no rales.   Lymphadenopathy:     He has no cervical adenopathy.   Neurological: He is alert.   Skin: Skin is warm, dry and intact.   Nursing note and vitals reviewed.      Assessment/Plan   Solo was seen today for uri, fever and flu symptoms.    Diagnoses and all orders for this visit:    Acute URI  -     amoxicillin-clavulanate (AUGMENTIN) 875-125 MG per tablet; Take 1 tablet by mouth Every 12 (Twelve) Hours for 7 days.  -     guaiFENesin (MUCINEX) 600 MG 12 hr tablet; Take 1 tablet by mouth Every 12 (Twelve) Hours for 7 days.  -     loratadine (CLARITIN) 10 MG tablet; Take  1 tablet by mouth Daily for 10 days.    Flu-like symptoms  -     POCT Influenza A/B    Influenza swab is negative, will treat for URI and continue to monitor symptoms.

## 2019-12-16 ENCOUNTER — APPOINTMENT (OUTPATIENT)
Dept: WOMENS IMAGING | Facility: HOSPITAL | Age: 59
End: 2019-12-16

## 2019-12-16 ENCOUNTER — TELEPHONE (OUTPATIENT)
Dept: INTERNAL MEDICINE | Facility: CLINIC | Age: 59
End: 2019-12-16

## 2019-12-16 ENCOUNTER — OFFICE VISIT (OUTPATIENT)
Dept: INTERNAL MEDICINE | Facility: CLINIC | Age: 59
End: 2019-12-16

## 2019-12-16 VITALS
HEART RATE: 74 BPM | HEIGHT: 64 IN | BODY MASS INDEX: 26.29 KG/M2 | SYSTOLIC BLOOD PRESSURE: 122 MMHG | WEIGHT: 154 LBS | TEMPERATURE: 97.4 F | DIASTOLIC BLOOD PRESSURE: 80 MMHG | OXYGEN SATURATION: 98 %

## 2019-12-16 DIAGNOSIS — J45.901 REACTIVE AIRWAY DISEASE WITH WHEEZING WITH ACUTE EXACERBATION, UNSPECIFIED ASTHMA SEVERITY, UNSPECIFIED WHETHER PERSISTENT: ICD-10-CM

## 2019-12-16 DIAGNOSIS — R68.89 FLU-LIKE SYMPTOMS: ICD-10-CM

## 2019-12-16 DIAGNOSIS — R05.9 COUGH: ICD-10-CM

## 2019-12-16 DIAGNOSIS — R50.9 FEBRILE ILLNESS: Primary | ICD-10-CM

## 2019-12-16 LAB
ANION GAP SERPL CALCULATED.3IONS-SCNC: 12.2 MMOL/L (ref 5–15)
ANISOCYTOSIS BLD QL: ABNORMAL
B PARAPERT DNA SPEC QL NAA+PROBE: NOT DETECTED
B PERT DNA SPEC QL NAA+PROBE: NOT DETECTED
BUN BLD-MCNC: 18 MG/DL (ref 8–23)
BUN/CREAT SERPL: 16.4 (ref 7–25)
C PNEUM DNA NPH QL NAA+NON-PROBE: NOT DETECTED
CALCIUM SPEC-SCNC: 8.8 MG/DL (ref 8.6–10.5)
CHLORIDE SERPL-SCNC: 99 MMOL/L (ref 98–107)
CO2 SERPL-SCNC: 27.8 MMOL/L (ref 22–29)
CREAT BLD-MCNC: 1.1 MG/DL (ref 0.76–1.27)
DEPRECATED RDW RBC AUTO: 42.9 FL (ref 37–54)
ERYTHROCYTE [DISTWIDTH] IN BLOOD BY AUTOMATED COUNT: 13.2 % (ref 12.3–15.4)
FLUAV H1 2009 PAND RNA NPH QL NAA+PROBE: NOT DETECTED
FLUAV H1 HA GENE NPH QL NAA+PROBE: NOT DETECTED
FLUAV H3 RNA NPH QL NAA+PROBE: NOT DETECTED
FLUAV SUBTYP SPEC NAA+PROBE: NOT DETECTED
FLUBV RNA ISLT QL NAA+PROBE: NOT DETECTED
GFR SERPL CREATININE-BSD FRML MDRD: 68 ML/MIN/1.73
GLUCOSE BLD-MCNC: 91 MG/DL (ref 65–99)
HADV DNA SPEC NAA+PROBE: NOT DETECTED
HCOV 229E RNA SPEC QL NAA+PROBE: NOT DETECTED
HCOV HKU1 RNA SPEC QL NAA+PROBE: NOT DETECTED
HCOV NL63 RNA SPEC QL NAA+PROBE: NOT DETECTED
HCOV OC43 RNA SPEC QL NAA+PROBE: NOT DETECTED
HCT VFR BLD AUTO: 37.6 % (ref 37.5–51)
HGB BLD-MCNC: 12.5 G/DL (ref 13–17.7)
HMPV RNA NPH QL NAA+NON-PROBE: DETECTED
HPIV1 RNA SPEC QL NAA+PROBE: NOT DETECTED
HPIV2 RNA SPEC QL NAA+PROBE: NOT DETECTED
HPIV3 RNA NPH QL NAA+PROBE: NOT DETECTED
HPIV4 P GENE NPH QL NAA+PROBE: NOT DETECTED
LYMPHOCYTES # BLD MANUAL: 0.1 10*3/MM3 (ref 0.7–3.1)
LYMPHOCYTES NFR BLD MANUAL: 3.1 % (ref 19.6–45.3)
LYMPHOCYTES NFR BLD MANUAL: 3.1 % (ref 5–12)
M PNEUMO IGG SER IA-ACNC: NOT DETECTED
MCH RBC QN AUTO: 29.5 PG (ref 26.6–33)
MCHC RBC AUTO-ENTMCNC: 33.2 G/DL (ref 31.5–35.7)
MCV RBC AUTO: 88.7 FL (ref 79–97)
MICROCYTES BLD QL: ABNORMAL
MONOCYTES # BLD AUTO: 0.1 10*3/MM3 (ref 0.1–0.9)
NEUTROPHILS # BLD AUTO: 2.92 10*3/MM3 (ref 1.7–7)
NEUTROPHILS NFR BLD MANUAL: 93.8 % (ref 42.7–76)
NRBC SPEC MANUAL: 1 /100 WBC (ref 0–0.2)
PLAT MORPH BLD: NORMAL
PLATELET # BLD AUTO: 107 10*3/MM3 (ref 140–450)
PMV BLD AUTO: 10.7 FL (ref 6–12)
POIKILOCYTOSIS BLD QL SMEAR: ABNORMAL
POTASSIUM BLD-SCNC: 3.7 MMOL/L (ref 3.5–5.2)
RBC # BLD AUTO: 4.24 10*6/MM3 (ref 4.14–5.8)
RHINOVIRUS RNA SPEC NAA+PROBE: NOT DETECTED
RSV RNA NPH QL NAA+NON-PROBE: NOT DETECTED
SODIUM BLD-SCNC: 139 MMOL/L (ref 136–145)
WBC MORPH BLD: NORMAL
WBC NRBC COR # BLD: 3.11 10*3/MM3 (ref 3.4–10.8)

## 2019-12-16 PROCEDURE — 99213 OFFICE O/P EST LOW 20 MIN: CPT | Performed by: NURSE PRACTITIONER

## 2019-12-16 PROCEDURE — 0100U HC BIOFIRE FILMARRAY RESP PANEL 2: CPT | Performed by: NURSE PRACTITIONER

## 2019-12-16 PROCEDURE — 85007 BL SMEAR W/DIFF WBC COUNT: CPT | Performed by: NURSE PRACTITIONER

## 2019-12-16 PROCEDURE — 80048 BASIC METABOLIC PNL TOTAL CA: CPT | Performed by: NURSE PRACTITIONER

## 2019-12-16 PROCEDURE — 85025 COMPLETE CBC W/AUTO DIFF WBC: CPT | Performed by: NURSE PRACTITIONER

## 2019-12-16 PROCEDURE — 36415 COLL VENOUS BLD VENIPUNCTURE: CPT | Performed by: NURSE PRACTITIONER

## 2019-12-16 PROCEDURE — 71046 X-RAY EXAM CHEST 2 VIEWS: CPT | Performed by: NURSE PRACTITIONER

## 2019-12-16 PROCEDURE — 71046 X-RAY EXAM CHEST 2 VIEWS: CPT | Performed by: RADIOLOGY

## 2019-12-16 RX ORDER — ALBUTEROL SULFATE 90 UG/1
2 AEROSOL, METERED RESPIRATORY (INHALATION) EVERY 4 HOURS PRN
Qty: 1 INHALER | Refills: 3 | Status: SHIPPED | OUTPATIENT
Start: 2019-12-16

## 2019-12-16 NOTE — TELEPHONE ENCOUNTER
PATIENTS WIFE CALLED IN REGARDS TO THE PT STILL NOT FEELING WELL SINCE HIS LAST OV ON 12/13/2019 WITH DR JULIO. SHE STATES THAT THE PATIENT HAD A FEVER LAST NIGHT .2 SHE STATES THAT RIGHT NOW IT .2 AND IS SEEMING A LITTLE DELUSIONAL (HAVING WEIRD FEVER DREAMS) SHE WOULD LIKE TO GET A CALL BACK IN REGARDS TO THIS MATTER AS SOON AS POSSIBLE [phone].

## 2019-12-17 RX ORDER — METHYLPREDNISOLONE 4 MG/1
TABLET ORAL
Qty: 21 EACH | Refills: 0 | OUTPATIENT
Start: 2019-12-17

## 2019-12-17 NOTE — TELEPHONE ENCOUNTER
Please advise med refill    Last OV: 12/16/19    Thank you,    Sharla Jamil called to schedule appt for pt in 2 weeks, pt requesting a medrol dos clemente thinking this could help with his breathing and sleeping, please advise thanks.    Discussed with patient-I would like to avoid a Medrol Clemente due to further immunosuppression with his recent illness. He will instead  a sample of Dulera 100 2 inh bid (rinse and spit after use). He will also continue Albuterol as needed.

## 2019-12-18 ENCOUNTER — TELEPHONE (OUTPATIENT)
Dept: INTERNAL MEDICINE | Facility: CLINIC | Age: 59
End: 2019-12-18

## 2019-12-18 ENCOUNTER — TELEPHONE (OUTPATIENT)
Dept: ONCOLOGY | Facility: HOSPITAL | Age: 59
End: 2019-12-18

## 2019-12-18 RX ORDER — AZITHROMYCIN 250 MG/1
TABLET, FILM COATED ORAL
Qty: 6 TABLET | Refills: 0 | Status: SHIPPED | OUTPATIENT
Start: 2019-12-18 | End: 2019-12-24 | Stop reason: HOSPADM

## 2019-12-18 RX ORDER — DEXTROMETHORPHAN HYDROBROMIDE AND PROMETHAZINE HYDROCHLORIDE 15; 6.25 MG/5ML; MG/5ML
5 SYRUP ORAL 4 TIMES DAILY PRN
Qty: 118 ML | Refills: 0 | Status: SHIPPED | OUTPATIENT
Start: 2019-12-18 | End: 2019-12-24 | Stop reason: HOSPADM

## 2019-12-18 NOTE — TELEPHONE ENCOUNTER
Called pt's wife she states pt still has fever of 100.1. She states she called and  stated no inhaler at  for pt. I have placed inhaler at  yesterday, per Loulou. I advised she could come in and pick that up. Pt is still soughing, tessalon peals does not help, she is wanting to know if you could give something else for cough please advise thanks.

## 2019-12-18 NOTE — TELEPHONE ENCOUNTER
----- Message from Sharla Guerrero MA sent at 12/18/2019 11:30 AM EST -----  Regarding: FW: Visit Follow-Up Question  Contact: [phone]  Please see message below thanks.      ----- Message -----  From: Solo MAN Morales  Sent: 12/18/2019  11:21 AM EST  To: [facility]  Subject: Visit Follow-Up Question                         Loulou:     2 days ago you asked me if I was winded, and I said no.  However, today, I seem to be winded after walking or exurting minimal output, and I wanted you to know that change.   Also my nighttime general symptoms seem to be worse, coughing and temperature rising on Monday to 104, and last night to 101.2.   I feel like I am still not getting better,  and  My wife Radha growing more concerned with me at night as symptoms get worse at night.    She is coming by to  the inhaler that you recommended.  However, I just want know if there is anything else that I could should be doing.  If so, please let me know, Thank you    Discussed results of CXR with patient-will add Zpak to Augmentin and continue to monitor (has f/u appt next week).

## 2019-12-18 NOTE — TELEPHONE ENCOUNTER
AHRPREET..I feel like a temp of 100.1 is not alarming considering his metapneumovirus, would you agree? I will send in Promethazine with Codeine for his cough (he will also  Dulera inh) if you are agreeable. Any other ideas? Thanks.

## 2019-12-18 NOTE — PROGRESS NOTES
Subjective   Solo Morales is a 59 y.o. male who presents for f/u regarding body aches and elevated temp.    He was started on Augmentin last week but continues to c/o diffuse body aches and chills, temp elevated to 104 last night. 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 He c/o cough and congestion, influenza swab was negative last week.        The following portions of the patient's history were reviewed and updated as appropriate: allergies, current medications, past social history and problem list.    Past Medical History:   Diagnosis Date   • Asthma    • Cancer (CMS/HCC)     hairy cell leukemia   • Coronary artery disease     minimal with some minimal narrowing of 1 vessel   • ED (erectile dysfunction) of organic origin    • H/O vitamin D deficiency    • Hypertension    • Thrombocytopenia (CMS/HCC)        No current facility-administered medications for this visit.     Current Outpatient Medications:   •  albuterol sulfate  (90 Base) MCG/ACT inhaler, Inhale 2 puffs Every 4 (Four) Hours As Needed for Wheezing., Disp: 1 inhaler, Rfl: 3  •  amoxicillin-clavulanate (AUGMENTIN) 875-125 MG per tablet, Take 1 tablet by mouth Every 12 (Twelve) Hours for 7 days., Disp: 14 tablet, Rfl: 0  •  buprenorphine-naloxone (SUBOXONE) 8-2 MG per SL tablet, TAKE 2 TABLETS UNDER TONGUE EVERY MORNING CS EXPRESS SC HL, Disp: , Rfl: 0  •  Chlorcyclizine-Pseudoephed (STAHIST AD) 25-60 MG tablet, One tablet three times a day as needed for congestion, Disp: 30 tablet, Rfl: 0  •  guaiFENesin (MUCINEX) 600 MG 12 hr tablet, Take 1 tablet by mouth Every 12 (Twelve) Hours for 7 days., Disp: 14 tablet, Rfl:   •  indomethacin (INDOCIN) 25 MG capsule, Take 25 mg by mouth 3 (three) times a day as needed for mild pain (1-3)., Disp: , Rfl:   •  loratadine (CLARITIN) 10 MG tablet, Take 1 tablet by mouth Daily for 10 days., Disp: 10 tablet, Rfl:   •  sildenafil (REVATIO) 20 MG tablet, USE 1 SUGAR AS DIRECTED AS NEEDED,  Disp: 10 tablet, Rfl: 5  •  vardenafil (LEVITRA) 20 MG tablet, TAKE 1 TABLET BY MOUTH AS NEEDED FOR ERECTILE DYSFUNCTION, Disp: 20 tablet, Rfl: 3  •  azithromycin (ZITHROMAX) 250 MG tablet, Take 2 tablets the first day, then 1 tablet daily for 4 days., Disp: 6 tablet, Rfl: 0  •  fexofenadine-pseudoephedrine (ALLEGRA-D)  MG per 12 hr tablet, Take 1 tablet by mouth 2 (Two) Times a Day., Disp: , Rfl:   •  mometasone-formoterol (DULERA 100) 100-5 MCG/ACT inhaler, Inhale 2 puffs 2 (Two) Times a Day., Disp: , Rfl:   •  promethazine-dextromethorphan (PROMETHAZINE-DM) 6.25-15 MG/5ML syrup, Take 5 mL by mouth 4 (Four) Times a Day As Needed for Cough., Disp: 118 mL, Rfl: 0    Facility-Administered Medications Ordered in Other Visits:   •  acetaminophen (TYLENOL) tablet 650 mg, 650 mg, Oral, Q4H PRN **OR** acetaminophen (TYLENOL) suppository 650 mg, 650 mg, Rectal, Q4H PRN, Kt Hastings MD  •  acetaminophen (TYLENOL) tablet 1,000 mg, 1,000 mg, Oral, Once, Kashmir Jacobo MD  •  aluminum-magnesium hydroxide-simethicone (MAALOX MAX) 400-400-40 MG/5ML suspension 15 mL, 15 mL, Oral, Q6H PRN, Kt Hastings MD  •  [START ON 12/20/2019] azithromycin (ZITHROMAX) 500 mg 0.9% NaCl (Add-vantage) 250 mL, 500 mg, Intravenous, Q24H, Kt Hastings MD  •  cefepime (MAXIPIME) 2 g/100 mL 0.9% NS (mbp), 2 g, Intravenous, Q8H, Kt Hastings MD  •  enoxaparin (LOVENOX) syringe 40 mg, 40 mg, Subcutaneous, Q24H, Kt Hastings MD  •  lactated ringers infusion, 100 mL/hr, Intravenous, Continuous, Kt Hastings MD, Last Rate: 100 mL/hr at 12/19/19 0557, 100 mL/hr at 12/19/19 0557  •  methylPREDNISolone sodium succinate (SOLU-Medrol) injection 40 mg, 40 mg, Intravenous, Q12H, Kt Hastings MD, 40 mg at 12/19/19 0514  •  ondansetron (ZOFRAN) injection 4 mg, 4 mg, Intravenous, Q6H PRN, Kt Hastings MD  •  Pharmacy to dose vancomycin, , Does not apply, Continuous PRN, Kt Hastings,  "MD  •  potassium chloride (MICRO-K) CR capsule 40 mEq, 40 mEq, Oral, PRN, 40 mEq at 12/19/19 0611 **OR** potassium chloride (KLOR-CON) packet 40 mEq, 40 mEq, Oral, PRN **OR** potassium chloride 10 mEq in 100 mL IVPB, 10 mEq, Intravenous, Q1H PRN, Kt Hastings MD  •  [COMPLETED] Insert peripheral IV, , , Once **AND** sodium chloride 0.9 % flush 10 mL, 10 mL, Intravenous, PRN, Kashmir Jacobo MD, 10 mL at 12/19/19 0233  •  sodium chloride 0.9 % flush 10 mL, 10 mL, Intravenous, Q12H, Kt Hastings MD  •  sodium chloride 0.9 % flush 10 mL, 10 mL, Intravenous, PRN, Kt Hastings MD    Allergies   Allergen Reactions   • Ciprofloxacin Rash       Review of Systems   Constitutional: Positive for fatigue and fever. Negative for activity change, appetite change, chills and unexpected weight change.   HENT: Positive for congestion, postnasal drip, rhinorrhea, sinus pressure, sneezing and sore throat. Negative for drooling, ear pain, facial swelling, hearing loss, mouth sores, nosebleeds, trouble swallowing and voice change.    Eyes: Negative for pain, discharge, itching and visual disturbance.   Respiratory: Positive for cough, chest tightness and shortness of breath. Negative for choking and wheezing.    Cardiovascular: Negative for chest pain and palpitations.   Gastrointestinal: Negative for abdominal pain, constipation, diarrhea, nausea and vomiting.   Endocrine: Negative for polyuria.   Genitourinary: Negative for dysuria and frequency.   Musculoskeletal: Positive for myalgias. Negative for back pain and joint swelling.       Objective   Vitals:    12/16/19 1424   BP: 122/80   BP Location: Left arm   Patient Position: Sitting   Cuff Size: Adult   Pulse: 74   Temp: 97.4 °F (36.3 °C)   TempSrc: Oral   SpO2: 98%   Weight: 69.9 kg (154 lb)   Height: 161.3 cm (63.5\")     Body mass index is 26.85 kg/m².  Physical Exam   Constitutional: He appears well-developed and well-nourished. He is cooperative. He " does not have a sickly appearance. He does not appear ill.   HENT:   Head: Normocephalic.   Right Ear: Hearing, tympanic membrane and external ear normal. No drainage, swelling or tenderness. Tympanic membrane is not injected, not erythematous and not bulging. No middle ear effusion.   Left Ear: Hearing, tympanic membrane and external ear normal. No drainage, swelling or tenderness. Tympanic membrane is not injected, not erythematous and not bulging.  No middle ear effusion.   Nose: Nose normal. No mucosal edema, rhinorrhea or sinus tenderness. Right sinus exhibits no maxillary sinus tenderness and no frontal sinus tenderness. Left sinus exhibits no maxillary sinus tenderness and no frontal sinus tenderness.   Mouth/Throat: Mucous membranes are normal. Posterior oropharyngeal erythema present.   Eyes: Conjunctivae and lids are normal. Right eye exhibits no discharge and no exudate. Left eye exhibits no discharge and no exudate.   Neck: Trachea normal and normal range of motion. Edema present.   Cardiovascular: Regular rhythm, normal heart sounds and normal pulses.   No murmur heard.  Pulmonary/Chest: Breath sounds normal. No respiratory distress. He has no decreased breath sounds. He has no wheezes. He has no rhonchi. He has no rales.   Lymphadenopathy:     He has no cervical adenopathy.   Neurological: He is alert.   Skin: Skin is warm, dry and intact.   Nursing note and vitals reviewed.      Assessment/Plan   Solo was seen today for uri.    Diagnoses and all orders for this visit:    Febrile illness  -     Cancel: CBC & Differential  -     Basic Metabolic Panel; Future  -     Basic Metabolic Panel  -     Cancel: CBC Auto Differential  -     Scan Slide; Future  -     Cancel: Scan Slide  -     CBC & Differential; Future  -     CBC & Differential  -     CBC Auto Differential  -     Manual Differential; Future  -     Manual Differential    Flu-like symptoms  -     XR Chest 2 View  -     Respiratory Panel, PCR - Swab,  Nasopharynx; Future  -     Respiratory Panel, PCR - Swab, Nasopharynx    Cough    Reactive airway disease with wheezing with acute exacerbation, unspecified asthma severity, unspecified whether persistent  -     albuterol sulfate  (90 Base) MCG/ACT inhaler; Inhale 2 puffs Every 4 (Four) Hours As Needed for Wheezing.    Check RSV panel to further evaluate symptoms and obtain CXR. Continue Augmentin and begin Albuterol inhaler.

## 2019-12-18 NOTE — TELEPHONE ENCOUNTER
Radiology review of the chest x-ray shows suggestions of pneumonia in right upper lobes so I recommend continuing the Augmentin and I have talked to Loulou about adding a Z-Clemente for further coverage.

## 2019-12-18 NOTE — TELEPHONE ENCOUNTER
Please call wife of patient - states she missed the call this morning.  Wondering if there is an sample inhaler in the office and wants to discuss his symptoms - not improving.      Please call  [phone] cell

## 2019-12-18 NOTE — TELEPHONE ENCOUNTER
Called pt and he said he is concerned that his platelets have dropped since developing this virus. He wants to make sure they do not drop further. He also reports increased SOA and decreased concentration. Message forwarded to Dr. Morrow for advise.     ----- Message from Solo Morales sent at 12/18/2019 10:52 AM EST -----  Regarding: Non-Urgent Medical Question  Contact: [phone]  Davy  I will try calling in to your office today about this, but I have been diagnosed with  the human metapneumovirus, and my symptoms have been severe; 104 temp at night, and now I'm feeling winded when walking. (When i usually run 5 miles every day).  [facility] office xrayed my chest 3 days ago and it was negative.  But what I want you to know is that my Platelets have gone down to 104.  That hasnt happened since I had active Leukemia.  So, I'm wondering, would it be bautista for me to come to your office?  I just don't want something to get away from me here as my symptoms are so severe.   I'll come whenever you need me today tomorrrow?

## 2019-12-19 ENCOUNTER — APPOINTMENT (OUTPATIENT)
Dept: GENERAL RADIOLOGY | Facility: HOSPITAL | Age: 59
End: 2019-12-19

## 2019-12-19 ENCOUNTER — HOSPITAL ENCOUNTER (INPATIENT)
Facility: HOSPITAL | Age: 59
LOS: 5 days | Discharge: HOME | End: 2019-12-24
Attending: EMERGENCY MEDICINE | Admitting: INTERNAL MEDICINE

## 2019-12-19 ENCOUNTER — TELEPHONE (OUTPATIENT)
Dept: GENERAL RADIOLOGY | Facility: HOSPITAL | Age: 59
End: 2019-12-19

## 2019-12-19 DIAGNOSIS — R50.9 FEVER AND CHILLS: ICD-10-CM

## 2019-12-19 DIAGNOSIS — J96.01 ACUTE RESPIRATORY FAILURE WITH HYPOXIA (HCC): Primary | ICD-10-CM

## 2019-12-19 DIAGNOSIS — J18.9 PNEUMONIA OF BOTH LUNGS DUE TO INFECTIOUS ORGANISM, UNSPECIFIED PART OF LUNG: ICD-10-CM

## 2019-12-19 DIAGNOSIS — C91.41 HAIRY CELL LEUKEMIA, IN REMISSION (HCC): Primary | ICD-10-CM

## 2019-12-19 LAB
ALBUMIN SERPL-MCNC: 3.3 G/DL (ref 3.5–5.2)
ALBUMIN/GLOB SERPL: 0.9 G/DL
ALP SERPL-CCNC: 87 U/L (ref 39–117)
ALT SERPL W P-5'-P-CCNC: 18 U/L (ref 1–41)
ANION GAP SERPL CALCULATED.3IONS-SCNC: 12.1 MMOL/L (ref 5–15)
ANION GAP SERPL CALCULATED.3IONS-SCNC: 14.6 MMOL/L (ref 5–15)
ARTERIAL PATENCY WRIST A: POSITIVE
ARTERIAL PATENCY WRIST A: POSITIVE
AST SERPL-CCNC: 30 U/L (ref 1–40)
ATMOSPHERIC PRESS: 762.1 MMHG
ATMOSPHERIC PRESS: 762.2 MMHG
BASE EXCESS BLDA CALC-SCNC: -0.1 MMOL/L (ref 0–2)
BASE EXCESS BLDA CALC-SCNC: -1 MMOL/L (ref 0–2)
BASOPHILS # BLD AUTO: 0 10*3/MM3 (ref 0–0.2)
BASOPHILS # BLD AUTO: 0 10*3/MM3 (ref 0–0.2)
BASOPHILS NFR BLD AUTO: 0 % (ref 0–1.5)
BASOPHILS NFR BLD AUTO: 0 % (ref 0–1.5)
BDY SITE: ABNORMAL
BDY SITE: ABNORMAL
BILIRUB SERPL-MCNC: 0.2 MG/DL (ref 0.2–1.2)
BUN BLD-MCNC: 16 MG/DL (ref 8–23)
BUN BLD-MCNC: 17 MG/DL (ref 8–23)
BUN/CREAT SERPL: 14.5 (ref 7–25)
BUN/CREAT SERPL: 15.8 (ref 7–25)
CALCIUM SPEC-SCNC: 8.1 MG/DL (ref 8.6–10.5)
CALCIUM SPEC-SCNC: 8.8 MG/DL (ref 8.6–10.5)
CHLORIDE SERPL-SCNC: 100 MMOL/L (ref 98–107)
CHLORIDE SERPL-SCNC: 105 MMOL/L (ref 98–107)
CO2 SERPL-SCNC: 21.9 MMOL/L (ref 22–29)
CO2 SERPL-SCNC: 22.4 MMOL/L (ref 22–29)
CREAT BLD-MCNC: 1.01 MG/DL (ref 0.76–1.27)
CREAT BLD-MCNC: 1.17 MG/DL (ref 0.76–1.27)
D-LACTATE SERPL-SCNC: 2 MMOL/L (ref 0.5–2)
DEPRECATED RDW RBC AUTO: 41.3 FL (ref 37–54)
DEPRECATED RDW RBC AUTO: 41.4 FL (ref 37–54)
EOSINOPHIL # BLD AUTO: 0.02 10*3/MM3 (ref 0–0.4)
EOSINOPHIL # BLD AUTO: 0.03 10*3/MM3 (ref 0–0.4)
EOSINOPHIL NFR BLD AUTO: 0.8 % (ref 0.3–6.2)
EOSINOPHIL NFR BLD AUTO: 1.2 % (ref 0.3–6.2)
ERYTHROCYTE [DISTWIDTH] IN BLOOD BY AUTOMATED COUNT: 13.2 % (ref 12.3–15.4)
ERYTHROCYTE [DISTWIDTH] IN BLOOD BY AUTOMATED COUNT: 13.4 % (ref 12.3–15.4)
GAS FLOW AIRWAY: 13 LPM
GFR SERPL CREATININE-BSD FRML MDRD: 64 ML/MIN/1.73
GFR SERPL CREATININE-BSD FRML MDRD: 75 ML/MIN/1.73
GLOBULIN UR ELPH-MCNC: 3.6 GM/DL
GLUCOSE BLD-MCNC: 107 MG/DL (ref 65–99)
GLUCOSE BLD-MCNC: 113 MG/DL (ref 65–99)
GLUCOSE BLDC GLUCOMTR-MCNC: 126 MG/DL (ref 70–130)
HCO3 BLDA-SCNC: 24.2 MMOL/L (ref 22–28)
HCO3 BLDA-SCNC: 24.2 MMOL/L (ref 22–28)
HCT VFR BLD AUTO: 30.1 % (ref 37.5–51)
HCT VFR BLD AUTO: 34.1 % (ref 37.5–51)
HGB BLD-MCNC: 10.4 G/DL (ref 13–17.7)
HGB BLD-MCNC: 11.9 G/DL (ref 13–17.7)
HOROWITZ INDEX BLD+IHG-RTO: 100 %
IMM GRANULOCYTES # BLD AUTO: 0.02 10*3/MM3 (ref 0–0.05)
IMM GRANULOCYTES # BLD AUTO: 0.02 10*3/MM3 (ref 0–0.05)
IMM GRANULOCYTES NFR BLD AUTO: 0.8 % (ref 0–0.5)
IMM GRANULOCYTES NFR BLD AUTO: 0.8 % (ref 0–0.5)
L PNEUMO1 AG UR QL IA: NEGATIVE
LYMPHOCYTES # BLD AUTO: 0.24 10*3/MM3 (ref 0.7–3.1)
LYMPHOCYTES # BLD AUTO: 0.29 10*3/MM3 (ref 0.7–3.1)
LYMPHOCYTES NFR BLD AUTO: 11.4 % (ref 19.6–45.3)
LYMPHOCYTES NFR BLD AUTO: 9.2 % (ref 19.6–45.3)
MCH RBC QN AUTO: 28.9 PG (ref 26.6–33)
MCH RBC QN AUTO: 30 PG (ref 26.6–33)
MCHC RBC AUTO-ENTMCNC: 34.6 G/DL (ref 31.5–35.7)
MCHC RBC AUTO-ENTMCNC: 34.9 G/DL (ref 31.5–35.7)
MCV RBC AUTO: 83.6 FL (ref 79–97)
MCV RBC AUTO: 85.9 FL (ref 79–97)
MODALITY: ABNORMAL
MODALITY: ABNORMAL
MONOCYTES # BLD AUTO: 0.09 10*3/MM3 (ref 0.1–0.9)
MONOCYTES # BLD AUTO: 0.1 10*3/MM3 (ref 0.1–0.9)
MONOCYTES NFR BLD AUTO: 3.5 % (ref 5–12)
MONOCYTES NFR BLD AUTO: 3.8 % (ref 5–12)
MRSA DNA SPEC QL NAA+PROBE: ABNORMAL
NEUTROPHILS # BLD AUTO: 2.12 10*3/MM3 (ref 1.7–7)
NEUTROPHILS # BLD AUTO: 2.24 10*3/MM3 (ref 1.7–7)
NEUTROPHILS NFR BLD AUTO: 83.1 % (ref 42.7–76)
NEUTROPHILS NFR BLD AUTO: 85.4 % (ref 42.7–76)
NRBC BLD AUTO-RTO: 0 /100 WBC (ref 0–0.2)
NRBC BLD AUTO-RTO: 0 /100 WBC (ref 0–0.2)
NT-PROBNP SERPL-MCNC: 116.4 PG/ML (ref 5–900)
O2 A-A PPRESDIFF RESPIRATORY: 0.1 MMHG
PCO2 BLDA: 37.5 MM HG (ref 35–45)
PCO2 BLDA: 41.7 MM HG (ref 35–45)
PH BLDA: 7.37 PH UNITS (ref 7.35–7.45)
PH BLDA: 7.42 PH UNITS (ref 7.35–7.45)
PLATELET # BLD AUTO: 143 10*3/MM3 (ref 140–450)
PLATELET # BLD AUTO: 175 10*3/MM3 (ref 140–450)
PMV BLD AUTO: 10 FL (ref 6–12)
PMV BLD AUTO: 10.2 FL (ref 6–12)
PO2 BLDA: 45.6 MM HG (ref 80–100)
PO2 BLDA: 82 MM HG (ref 80–100)
POTASSIUM BLD-SCNC: 3.4 MMOL/L (ref 3.5–5.2)
POTASSIUM BLD-SCNC: 4 MMOL/L (ref 3.5–5.2)
PROCALCITONIN SERPL-MCNC: 3.01 NG/ML (ref 0.1–0.25)
PROT SERPL-MCNC: 6.9 G/DL (ref 6–8.5)
RBC # BLD AUTO: 3.6 10*6/MM3 (ref 4.14–5.8)
RBC # BLD AUTO: 3.97 10*6/MM3 (ref 4.14–5.8)
S PNEUM AG SPEC QL LA: NEGATIVE
SAO2 % BLDCOA: 82.3 % (ref 92–99)
SAO2 % BLDCOA: 95.7 % (ref 92–99)
SODIUM BLD-SCNC: 137 MMOL/L (ref 136–145)
SODIUM BLD-SCNC: 139 MMOL/L (ref 136–145)
TOTAL RATE: 22 BREATHS/MINUTE
TOTAL RATE: 26 BREATHS/MINUTE
TROPONIN T SERPL-MCNC: <0.01 NG/ML (ref 0–0.03)
WBC NRBC COR # BLD: 2.55 10*3/MM3 (ref 3.4–10.8)
WBC NRBC COR # BLD: 2.62 10*3/MM3 (ref 3.4–10.8)

## 2019-12-19 PROCEDURE — 36600 WITHDRAWAL OF ARTERIAL BLOOD: CPT

## 2019-12-19 PROCEDURE — 87040 BLOOD CULTURE FOR BACTERIA: CPT | Performed by: EMERGENCY MEDICINE

## 2019-12-19 PROCEDURE — 82784 ASSAY IGA/IGD/IGG/IGM EACH: CPT | Performed by: INTERNAL MEDICINE

## 2019-12-19 PROCEDURE — 80053 COMPREHEN METABOLIC PANEL: CPT | Performed by: EMERGENCY MEDICINE

## 2019-12-19 PROCEDURE — 87899 AGENT NOS ASSAY W/OPTIC: CPT | Performed by: INTERNAL MEDICINE

## 2019-12-19 PROCEDURE — 87641 MR-STAPH DNA AMP PROBE: CPT | Performed by: INTERNAL MEDICINE

## 2019-12-19 PROCEDURE — 25010000002 VANCOMYCIN 10 G RECONSTITUTED SOLUTION: Performed by: INTERNAL MEDICINE

## 2019-12-19 PROCEDURE — 85025 COMPLETE CBC W/AUTO DIFF WBC: CPT | Performed by: EMERGENCY MEDICINE

## 2019-12-19 PROCEDURE — 25010000002 CEFTRIAXONE PER 250 MG: Performed by: EMERGENCY MEDICINE

## 2019-12-19 PROCEDURE — 82803 BLOOD GASES ANY COMBINATION: CPT

## 2019-12-19 PROCEDURE — 99233 SBSQ HOSP IP/OBS HIGH 50: CPT | Performed by: INTERNAL MEDICINE

## 2019-12-19 PROCEDURE — 99285 EMERGENCY DEPT VISIT HI MDM: CPT

## 2019-12-19 PROCEDURE — 94799 UNLISTED PULMONARY SVC/PX: CPT

## 2019-12-19 PROCEDURE — 82962 GLUCOSE BLOOD TEST: CPT

## 2019-12-19 PROCEDURE — 83605 ASSAY OF LACTIC ACID: CPT | Performed by: EMERGENCY MEDICINE

## 2019-12-19 PROCEDURE — 25010000002 AZITHROMYCIN PER 500 MG: Performed by: EMERGENCY MEDICINE

## 2019-12-19 PROCEDURE — 93005 ELECTROCARDIOGRAM TRACING: CPT | Performed by: EMERGENCY MEDICINE

## 2019-12-19 PROCEDURE — 93010 ELECTROCARDIOGRAM REPORT: CPT | Performed by: INTERNAL MEDICINE

## 2019-12-19 PROCEDURE — 25010000002 ENOXAPARIN PER 10 MG: Performed by: INTERNAL MEDICINE

## 2019-12-19 PROCEDURE — 84145 PROCALCITONIN (PCT): CPT | Performed by: EMERGENCY MEDICINE

## 2019-12-19 PROCEDURE — 87040 BLOOD CULTURE FOR BACTERIA: CPT | Performed by: INTERNAL MEDICINE

## 2019-12-19 PROCEDURE — 82785 ASSAY OF IGE: CPT | Performed by: INTERNAL MEDICINE

## 2019-12-19 PROCEDURE — 25010000002 CEFEPIME PER 500 MG: Performed by: INTERNAL MEDICINE

## 2019-12-19 PROCEDURE — 83880 ASSAY OF NATRIURETIC PEPTIDE: CPT | Performed by: EMERGENCY MEDICINE

## 2019-12-19 PROCEDURE — 25010000002 METHYLPREDNISOLONE PER 40 MG: Performed by: INTERNAL MEDICINE

## 2019-12-19 PROCEDURE — 25010000002 VANCOMYCIN PER 500 MG: Performed by: INTERNAL MEDICINE

## 2019-12-19 PROCEDURE — 84484 ASSAY OF TROPONIN QUANT: CPT | Performed by: EMERGENCY MEDICINE

## 2019-12-19 PROCEDURE — 71045 X-RAY EXAM CHEST 1 VIEW: CPT

## 2019-12-19 PROCEDURE — 80048 BASIC METABOLIC PNL TOTAL CA: CPT | Performed by: INTERNAL MEDICINE

## 2019-12-19 PROCEDURE — 85025 COMPLETE CBC W/AUTO DIFF WBC: CPT | Performed by: INTERNAL MEDICINE

## 2019-12-19 RX ORDER — POTASSIUM CHLORIDE 750 MG/1
40 CAPSULE, EXTENDED RELEASE ORAL AS NEEDED
Status: DISCONTINUED | OUTPATIENT
Start: 2019-12-19 | End: 2019-12-24 | Stop reason: HOSPADM

## 2019-12-19 RX ORDER — SODIUM CHLORIDE 0.9 % (FLUSH) 0.9 %
10 SYRINGE (ML) INJECTION AS NEEDED
Status: DISCONTINUED | OUTPATIENT
Start: 2019-12-19 | End: 2019-12-24 | Stop reason: HOSPADM

## 2019-12-19 RX ORDER — SENNA PLUS 8.6 MG/1
1 TABLET ORAL AS NEEDED
COMMUNITY

## 2019-12-19 RX ORDER — POTASSIUM CHLORIDE 7.45 MG/ML
10 INJECTION INTRAVENOUS
Status: DISCONTINUED | OUTPATIENT
Start: 2019-12-19 | End: 2019-12-24 | Stop reason: HOSPADM

## 2019-12-19 RX ORDER — ACETAMINOPHEN 325 MG/1
650 TABLET ORAL EVERY 4 HOURS PRN
Status: DISCONTINUED | OUTPATIENT
Start: 2019-12-19 | End: 2019-12-24 | Stop reason: HOSPADM

## 2019-12-19 RX ORDER — ALUMINA, MAGNESIA, AND SIMETHICONE 2400; 2400; 240 MG/30ML; MG/30ML; MG/30ML
15 SUSPENSION ORAL EVERY 6 HOURS PRN
Status: DISCONTINUED | OUTPATIENT
Start: 2019-12-19 | End: 2019-12-24 | Stop reason: HOSPADM

## 2019-12-19 RX ORDER — METHYLPREDNISOLONE SODIUM SUCCINATE 40 MG/ML
40 INJECTION, POWDER, LYOPHILIZED, FOR SOLUTION INTRAMUSCULAR; INTRAVENOUS EVERY 12 HOURS
Status: DISCONTINUED | OUTPATIENT
Start: 2019-12-19 | End: 2019-12-22

## 2019-12-19 RX ORDER — BUPRENORPHINE AND NALOXONE 8; 2 MG/1; MG/1
2 FILM, SOLUBLE BUCCAL; SUBLINGUAL EVERY EVENING
Status: DISCONTINUED | OUTPATIENT
Start: 2019-12-19 | End: 2019-12-24 | Stop reason: HOSPADM

## 2019-12-19 RX ORDER — ONDANSETRON 2 MG/ML
4 INJECTION INTRAMUSCULAR; INTRAVENOUS EVERY 6 HOURS PRN
Status: DISCONTINUED | OUTPATIENT
Start: 2019-12-19 | End: 2019-12-24 | Stop reason: HOSPADM

## 2019-12-19 RX ORDER — SODIUM CHLORIDE 0.9 % (FLUSH) 0.9 %
10 SYRINGE (ML) INJECTION EVERY 12 HOURS SCHEDULED
Status: DISCONTINUED | OUTPATIENT
Start: 2019-12-19 | End: 2019-12-24 | Stop reason: HOSPADM

## 2019-12-19 RX ORDER — VANCOMYCIN HYDROCHLORIDE 1 G/200ML
1000 INJECTION, SOLUTION INTRAVENOUS EVERY 12 HOURS
Status: DISCONTINUED | OUTPATIENT
Start: 2019-12-19 | End: 2019-12-23

## 2019-12-19 RX ORDER — FEXOFENADINE HYDROCHLORIDE 60 MG/1
60 TABLET, FILM COATED ORAL DAILY
COMMUNITY
End: 2020-02-20

## 2019-12-19 RX ORDER — CEFTRIAXONE SODIUM 1 G/50ML
1 INJECTION, SOLUTION INTRAVENOUS ONCE
Status: COMPLETED | OUTPATIENT
Start: 2019-12-19 | End: 2019-12-19

## 2019-12-19 RX ORDER — POTASSIUM CHLORIDE 1.5 G/1.77G
40 POWDER, FOR SOLUTION ORAL AS NEEDED
Status: DISCONTINUED | OUTPATIENT
Start: 2019-12-19 | End: 2019-12-24 | Stop reason: HOSPADM

## 2019-12-19 RX ORDER — FEXOFENADINE HCL AND PSEUDOEPHEDRINE HCI 60; 120 MG/1; MG/1
1 TABLET, EXTENDED RELEASE ORAL 2 TIMES DAILY
COMMUNITY
End: 2019-12-19

## 2019-12-19 RX ORDER — ACETAMINOPHEN 500 MG
1000 TABLET ORAL ONCE
Status: DISCONTINUED | OUTPATIENT
Start: 2019-12-19 | End: 2019-12-24 | Stop reason: HOSPADM

## 2019-12-19 RX ORDER — SODIUM CHLORIDE, SODIUM LACTATE, POTASSIUM CHLORIDE, CALCIUM CHLORIDE 600; 310; 30; 20 MG/100ML; MG/100ML; MG/100ML; MG/100ML
100 INJECTION, SOLUTION INTRAVENOUS CONTINUOUS
Status: DISCONTINUED | OUTPATIENT
Start: 2019-12-19 | End: 2019-12-20

## 2019-12-19 RX ORDER — ACETAMINOPHEN 650 MG/1
650 SUPPOSITORY RECTAL EVERY 4 HOURS PRN
Status: DISCONTINUED | OUTPATIENT
Start: 2019-12-19 | End: 2019-12-24 | Stop reason: HOSPADM

## 2019-12-19 RX ADMIN — AZITHROMYCIN 500 MG: 500 INJECTION, POWDER, LYOPHILIZED, FOR SOLUTION INTRAVENOUS at 04:43

## 2019-12-19 RX ADMIN — SODIUM CHLORIDE, POTASSIUM CHLORIDE, SODIUM LACTATE AND CALCIUM CHLORIDE 100 ML/HR: 600; 310; 30; 20 INJECTION, SOLUTION INTRAVENOUS at 19:47

## 2019-12-19 RX ADMIN — VANCOMYCIN HYDROCHLORIDE 1000 MG: 1 INJECTION, SOLUTION INTRAVENOUS at 17:52

## 2019-12-19 RX ADMIN — CEFEPIME HYDROCHLORIDE 2 G: 2 INJECTION, POWDER, FOR SOLUTION INTRAVENOUS at 13:48

## 2019-12-19 RX ADMIN — METHYLPREDNISOLONE SODIUM SUCCINATE 40 MG: 40 INJECTION, POWDER, FOR SOLUTION INTRAMUSCULAR; INTRAVENOUS at 05:14

## 2019-12-19 RX ADMIN — VANCOMYCIN HYDROCHLORIDE 1500 MG: 10 INJECTION, POWDER, LYOPHILIZED, FOR SOLUTION INTRAVENOUS at 05:59

## 2019-12-19 RX ADMIN — CEFEPIME HYDROCHLORIDE 2 G: 2 INJECTION, POWDER, FOR SOLUTION INTRAVENOUS at 22:06

## 2019-12-19 RX ADMIN — METHYLPREDNISOLONE SODIUM SUCCINATE 40 MG: 40 INJECTION, POWDER, FOR SOLUTION INTRAMUSCULAR; INTRAVENOUS at 17:52

## 2019-12-19 RX ADMIN — CEFEPIME HYDROCHLORIDE 2 G: 2 INJECTION, POWDER, FOR SOLUTION INTRAVENOUS at 05:18

## 2019-12-19 RX ADMIN — CEFTRIAXONE SODIUM 1 G: 1 INJECTION, SOLUTION INTRAVENOUS at 04:09

## 2019-12-19 RX ADMIN — SODIUM CHLORIDE, PRESERVATIVE FREE 10 ML: 5 INJECTION INTRAVENOUS at 21:00

## 2019-12-19 RX ADMIN — ENOXAPARIN SODIUM 40 MG: 40 INJECTION SUBCUTANEOUS at 09:00

## 2019-12-19 RX ADMIN — SODIUM CHLORIDE 1000 ML: 9 INJECTION, SOLUTION INTRAVENOUS at 02:33

## 2019-12-19 RX ADMIN — SODIUM CHLORIDE, POTASSIUM CHLORIDE, SODIUM LACTATE AND CALCIUM CHLORIDE 100 ML/HR: 600; 310; 30; 20 INJECTION, SOLUTION INTRAVENOUS at 05:57

## 2019-12-19 RX ADMIN — POTASSIUM CHLORIDE 40 MEQ: 750 CAPSULE, EXTENDED RELEASE ORAL at 06:11

## 2019-12-19 RX ADMIN — BUPRENORPHINE HYDROCHLORIDE, NALOXONE HYDROCHLORIDE 2 FILM: 8; 2 FILM, SOLUBLE BUCCAL; SUBLINGUAL at 19:48

## 2019-12-19 RX ADMIN — SODIUM CHLORIDE, PRESERVATIVE FREE 10 ML: 5 INJECTION INTRAVENOUS at 02:33

## 2019-12-19 NOTE — TELEPHONE ENCOUNTER
----- Message from Davy Morrow MD sent at 12/18/2019  3:08 PM EST -----  Please call this patient request a CBC to be done at the beginning of the week, RN evaluation. Thanks, IOANA

## 2019-12-20 ENCOUNTER — APPOINTMENT (OUTPATIENT)
Dept: GENERAL RADIOLOGY | Facility: HOSPITAL | Age: 59
End: 2019-12-20

## 2019-12-20 LAB
ANION GAP SERPL CALCULATED.3IONS-SCNC: 17.2 MMOL/L (ref 5–15)
ANISOCYTOSIS BLD QL: ABNORMAL
ARTERIAL PATENCY WRIST A: POSITIVE
ATMOSPHERIC PRESS: 762 MMHG
BASE EXCESS BLDA CALC-SCNC: -0.5 MMOL/L (ref 0–2)
BDY SITE: ABNORMAL
BUN BLD-MCNC: 17 MG/DL (ref 8–23)
BUN/CREAT SERPL: 20.7 (ref 7–25)
CALCIUM SPEC-SCNC: 8.7 MG/DL (ref 8.6–10.5)
CHLORIDE SERPL-SCNC: 104 MMOL/L (ref 98–107)
CO2 SERPL-SCNC: 20.8 MMOL/L (ref 22–29)
CREAT BLD-MCNC: 0.82 MG/DL (ref 0.76–1.27)
D-LACTATE SERPL-SCNC: 1.5 MMOL/L (ref 0.5–2)
DEPRECATED RDW RBC AUTO: 44.1 FL (ref 37–54)
ELLIPTOCYTES BLD QL SMEAR: ABNORMAL
ERYTHROCYTE [DISTWIDTH] IN BLOOD BY AUTOMATED COUNT: 13.9 % (ref 12.3–15.4)
GFR SERPL CREATININE-BSD FRML MDRD: 96 ML/MIN/1.73
GLUCOSE BLD-MCNC: 147 MG/DL (ref 65–99)
GLUCOSE BLDC GLUCOMTR-MCNC: 134 MG/DL (ref 70–130)
GLUCOSE BLDC GLUCOMTR-MCNC: 143 MG/DL (ref 70–130)
GLUCOSE BLDC GLUCOMTR-MCNC: 145 MG/DL (ref 70–130)
GLUCOSE BLDC GLUCOMTR-MCNC: 154 MG/DL (ref 70–130)
GLUCOSE BLDC GLUCOMTR-MCNC: 154 MG/DL (ref 70–130)
GLUCOSE BLDC GLUCOMTR-MCNC: 161 MG/DL (ref 70–130)
HCO3 BLDA-SCNC: 23.6 MMOL/L (ref 22–28)
HCT VFR BLD AUTO: 33.6 % (ref 37.5–51)
HGB BLD-MCNC: 11.3 G/DL (ref 13–17.7)
HOROWITZ INDEX BLD+IHG-RTO: 100 %
LYMPHOCYTES # BLD MANUAL: 0 10*3/MM3 (ref 0.7–3.1)
LYMPHOCYTES NFR BLD MANUAL: 0 % (ref 19.6–45.3)
MCH RBC QN AUTO: 28.9 PG (ref 26.6–33)
MCHC RBC AUTO-ENTMCNC: 33.6 G/DL (ref 31.5–35.7)
MCV RBC AUTO: 85.9 FL (ref 79–97)
MICROCYTES BLD QL: ABNORMAL
MODALITY: ABNORMAL
NEUTROPHILS # BLD AUTO: 6.48 10*3/MM3 (ref 1.7–7)
NEUTROPHILS NFR BLD MANUAL: 100 % (ref 42.7–76)
O2 A-A PPRESDIFF RESPIRATORY: 0.1 MMHG
PCO2 BLDA: 36.1 MM HG (ref 35–45)
PH BLDA: 7.42 PH UNITS (ref 7.35–7.45)
PLAT MORPH BLD: NORMAL
PLATELET # BLD AUTO: 241 10*3/MM3 (ref 140–450)
PMV BLD AUTO: 9.7 FL (ref 6–12)
PO2 BLDA: 59.1 MM HG (ref 80–100)
POIKILOCYTOSIS BLD QL SMEAR: ABNORMAL
POTASSIUM BLD-SCNC: 4.1 MMOL/L (ref 3.5–5.2)
RBC # BLD AUTO: 3.91 10*6/MM3 (ref 4.14–5.8)
SAO2 % BLDCOA: 90.9 % (ref 92–99)
SODIUM BLD-SCNC: 142 MMOL/L (ref 136–145)
TOTAL RATE: 24 BREATHS/MINUTE
WBC MORPH BLD: NORMAL
WBC NRBC COR # BLD: 6.48 10*3/MM3 (ref 3.4–10.8)

## 2019-12-20 PROCEDURE — 85025 COMPLETE CBC W/AUTO DIFF WBC: CPT | Performed by: INTERNAL MEDICINE

## 2019-12-20 PROCEDURE — 36600 WITHDRAWAL OF ARTERIAL BLOOD: CPT

## 2019-12-20 PROCEDURE — 25010000002 CEFEPIME PER 500 MG: Performed by: INTERNAL MEDICINE

## 2019-12-20 PROCEDURE — 25010000002 VANCOMYCIN PER 500 MG: Performed by: INTERNAL MEDICINE

## 2019-12-20 PROCEDURE — 25010000002 METHYLPREDNISOLONE PER 40 MG: Performed by: INTERNAL MEDICINE

## 2019-12-20 PROCEDURE — 80048 BASIC METABOLIC PNL TOTAL CA: CPT | Performed by: INTERNAL MEDICINE

## 2019-12-20 PROCEDURE — 82962 GLUCOSE BLOOD TEST: CPT

## 2019-12-20 PROCEDURE — 99233 SBSQ HOSP IP/OBS HIGH 50: CPT | Performed by: INTERNAL MEDICINE

## 2019-12-20 PROCEDURE — 25010000002 IMMUNE GLOBULIN (HUMAN) 5 GM/50ML SOLUTION: Performed by: INTERNAL MEDICINE

## 2019-12-20 PROCEDURE — 25010000002 ENOXAPARIN PER 10 MG: Performed by: INTERNAL MEDICINE

## 2019-12-20 PROCEDURE — 82803 BLOOD GASES ANY COMBINATION: CPT

## 2019-12-20 PROCEDURE — 25010000002 IMMUNE GLOBULIN (HUMAN) 20 GM/200ML SOLUTION: Performed by: INTERNAL MEDICINE

## 2019-12-20 PROCEDURE — 25010000002 FUROSEMIDE PER 20 MG: Performed by: INTERNAL MEDICINE

## 2019-12-20 PROCEDURE — 85007 BL SMEAR W/DIFF WBC COUNT: CPT | Performed by: INTERNAL MEDICINE

## 2019-12-20 PROCEDURE — 71045 X-RAY EXAM CHEST 1 VIEW: CPT

## 2019-12-20 PROCEDURE — 83605 ASSAY OF LACTIC ACID: CPT | Performed by: INTERNAL MEDICINE

## 2019-12-20 PROCEDURE — 94799 UNLISTED PULMONARY SVC/PX: CPT

## 2019-12-20 PROCEDURE — 25010000002 DIPHENHYDRAMINE PER 50 MG: Performed by: INTERNAL MEDICINE

## 2019-12-20 RX ORDER — PANTOPRAZOLE SODIUM 40 MG/1
40 TABLET, DELAYED RELEASE ORAL
Status: DISCONTINUED | OUTPATIENT
Start: 2019-12-21 | End: 2019-12-24 | Stop reason: HOSPADM

## 2019-12-20 RX ORDER — DIPHENHYDRAMINE HYDROCHLORIDE 50 MG/ML
25 INJECTION INTRAMUSCULAR; INTRAVENOUS ONCE
Status: COMPLETED | OUTPATIENT
Start: 2019-12-20 | End: 2019-12-20

## 2019-12-20 RX ORDER — FUROSEMIDE 10 MG/ML
40 INJECTION INTRAMUSCULAR; INTRAVENOUS ONCE
Status: COMPLETED | OUTPATIENT
Start: 2019-12-20 | End: 2019-12-20

## 2019-12-20 RX ORDER — ACETAMINOPHEN 325 MG/1
650 TABLET ORAL ONCE
Status: COMPLETED | OUTPATIENT
Start: 2019-12-20 | End: 2019-12-20

## 2019-12-20 RX ADMIN — VANCOMYCIN HYDROCHLORIDE 1000 MG: 1 INJECTION, SOLUTION INTRAVENOUS at 04:41

## 2019-12-20 RX ADMIN — METHYLPREDNISOLONE SODIUM SUCCINATE 40 MG: 40 INJECTION, POWDER, FOR SOLUTION INTRAMUSCULAR; INTRAVENOUS at 04:41

## 2019-12-20 RX ADMIN — SODIUM CHLORIDE, PRESERVATIVE FREE 10 ML: 5 INJECTION INTRAVENOUS at 21:12

## 2019-12-20 RX ADMIN — CEFEPIME HYDROCHLORIDE 2 G: 2 INJECTION, POWDER, FOR SOLUTION INTRAVENOUS at 13:45

## 2019-12-20 RX ADMIN — SODIUM CHLORIDE, PRESERVATIVE FREE 10 ML: 5 INJECTION INTRAVENOUS at 08:26

## 2019-12-20 RX ADMIN — ACETAMINOPHEN 650 MG: 325 TABLET, FILM COATED ORAL at 09:41

## 2019-12-20 RX ADMIN — VANCOMYCIN HYDROCHLORIDE 1000 MG: 1 INJECTION, SOLUTION INTRAVENOUS at 16:43

## 2019-12-20 RX ADMIN — CEFEPIME HYDROCHLORIDE 2 G: 2 INJECTION, POWDER, FOR SOLUTION INTRAVENOUS at 21:10

## 2019-12-20 RX ADMIN — CEFEPIME HYDROCHLORIDE 2 G: 2 INJECTION, POWDER, FOR SOLUTION INTRAVENOUS at 05:20

## 2019-12-20 RX ADMIN — FUROSEMIDE 40 MG: 40 INJECTION, SOLUTION INTRAMUSCULAR; INTRAVENOUS at 12:50

## 2019-12-20 RX ADMIN — SODIUM CHLORIDE, POTASSIUM CHLORIDE, SODIUM LACTATE AND CALCIUM CHLORIDE 100 ML/HR: 600; 310; 30; 20 INJECTION, SOLUTION INTRAVENOUS at 05:15

## 2019-12-20 RX ADMIN — ENOXAPARIN SODIUM 40 MG: 40 INJECTION SUBCUTANEOUS at 08:24

## 2019-12-20 RX ADMIN — BUPRENORPHINE HYDROCHLORIDE, NALOXONE HYDROCHLORIDE 2 FILM: 8; 2 FILM, SOLUBLE BUCCAL; SUBLINGUAL at 16:42

## 2019-12-20 RX ADMIN — DIPHENHYDRAMINE HYDROCHLORIDE 25 MG: 50 INJECTION, SOLUTION INTRAMUSCULAR; INTRAVENOUS at 09:40

## 2019-12-20 RX ADMIN — IMMUNE GLOBULIN (HUMAN) 20 G: 10 INJECTION INTRAVENOUS; SUBCUTANEOUS at 10:19

## 2019-12-20 RX ADMIN — METHYLPREDNISOLONE SODIUM SUCCINATE 40 MG: 40 INJECTION, POWDER, FOR SOLUTION INTRAMUSCULAR; INTRAVENOUS at 16:42

## 2019-12-20 RX ADMIN — IMMUNE GLOBULIN (HUMAN) 5 G: 10 INJECTION INTRAVENOUS; SUBCUTANEOUS at 09:53

## 2019-12-21 ENCOUNTER — APPOINTMENT (OUTPATIENT)
Dept: CARDIOLOGY | Facility: HOSPITAL | Age: 59
End: 2019-12-21

## 2019-12-21 ENCOUNTER — APPOINTMENT (OUTPATIENT)
Dept: GENERAL RADIOLOGY | Facility: HOSPITAL | Age: 59
End: 2019-12-21

## 2019-12-21 LAB
ANION GAP SERPL CALCULATED.3IONS-SCNC: 13.8 MMOL/L (ref 5–15)
AORTIC DIMENSIONLESS INDEX: 0.7 (DI)
ARTERIAL PATENCY WRIST A: POSITIVE
ATMOSPHERIC PRESS: 759.9 MMHG
BASE EXCESS BLDA CALC-SCNC: 1.3 MMOL/L (ref 0–2)
BASOPHILS # BLD AUTO: 0.01 10*3/MM3 (ref 0–0.2)
BASOPHILS NFR BLD AUTO: 0.1 % (ref 0–1.5)
BDY SITE: ABNORMAL
BH CV ECHO MEAS - ACS: 2.2 CM
BH CV ECHO MEAS - AO MAX PG (FULL): 5 MMHG
BH CV ECHO MEAS - AO MAX PG: 10.2 MMHG
BH CV ECHO MEAS - AO MEAN PG (FULL): 3 MMHG
BH CV ECHO MEAS - AO MEAN PG: 5 MMHG
BH CV ECHO MEAS - AO ROOT AREA (BSA CORRECTED): 1.8
BH CV ECHO MEAS - AO ROOT AREA: 7.5 CM^2
BH CV ECHO MEAS - AO ROOT DIAM: 3.1 CM
BH CV ECHO MEAS - AO V2 MAX: 160 CM/SEC
BH CV ECHO MEAS - AO V2 MEAN: 101 CM/SEC
BH CV ECHO MEAS - AO V2 VTI: 37.8 CM
BH CV ECHO MEAS - AVA(I,A): 1.9 CM^2
BH CV ECHO MEAS - AVA(I,D): 1.9 CM^2
BH CV ECHO MEAS - AVA(V,A): 2.2 CM^2
BH CV ECHO MEAS - AVA(V,D): 2.2 CM^2
BH CV ECHO MEAS - BSA(HAYCOCK): 1.7 M^2
BH CV ECHO MEAS - BSA: 1.7 M^2
BH CV ECHO MEAS - BZI_BMI: 25.7 KILOGRAMS/M^2
BH CV ECHO MEAS - BZI_METRIC_HEIGHT: 160 CM
BH CV ECHO MEAS - BZI_METRIC_WEIGHT: 65.8 KG
BH CV ECHO MEAS - EDV(MOD-SP2): 97 ML
BH CV ECHO MEAS - EDV(MOD-SP4): 86 ML
BH CV ECHO MEAS - EDV(TEICH): 144.4 ML
BH CV ECHO MEAS - EF(CUBED): 72.6 %
BH CV ECHO MEAS - EF(MOD-BP): 62 %
BH CV ECHO MEAS - EF(MOD-SP2): 59.8 %
BH CV ECHO MEAS - EF(MOD-SP4): 61.6 %
BH CV ECHO MEAS - EF(TEICH): 63.8 %
BH CV ECHO MEAS - ESV(MOD-SP2): 39 ML
BH CV ECHO MEAS - ESV(MOD-SP4): 33 ML
BH CV ECHO MEAS - ESV(TEICH): 52.3 ML
BH CV ECHO MEAS - FS: 35 %
BH CV ECHO MEAS - IVS/LVPW: 0.91
BH CV ECHO MEAS - IVSD: 0.85 CM
BH CV ECHO MEAS - LAT PEAK E' VEL: 12 CM/SEC
BH CV ECHO MEAS - LV DIASTOLIC VOL/BSA (35-75): 51 ML/M^2
BH CV ECHO MEAS - LV MASS(C)D: 181.7 GRAMS
BH CV ECHO MEAS - LV MASS(C)DI: 107.7 GRAMS/M^2
BH CV ECHO MEAS - LV MAX PG: 5.2 MMHG
BH CV ECHO MEAS - LV MEAN PG: 2 MMHG
BH CV ECHO MEAS - LV SYSTOLIC VOL/BSA (12-30): 19.6 ML/M^2
BH CV ECHO MEAS - LV V1 MAX: 114 CM/SEC
BH CV ECHO MEAS - LV V1 MEAN: 65.6 CM/SEC
BH CV ECHO MEAS - LV V1 VTI: 23.2 CM
BH CV ECHO MEAS - LVIDD: 5.5 CM
BH CV ECHO MEAS - LVIDS: 3.5 CM
BH CV ECHO MEAS - LVLD AP2: 8.5 CM
BH CV ECHO MEAS - LVLD AP4: 7.7 CM
BH CV ECHO MEAS - LVLS AP2: 7.3 CM
BH CV ECHO MEAS - LVLS AP4: 6.8 CM
BH CV ECHO MEAS - LVOT AREA (M): 3.1 CM^2
BH CV ECHO MEAS - LVOT AREA: 3.1 CM^2
BH CV ECHO MEAS - LVOT DIAM: 2 CM
BH CV ECHO MEAS - LVPWD: 0.94 CM
BH CV ECHO MEAS - MED PEAK E' VEL: 7 CM/SEC
BH CV ECHO MEAS - MR MAX PG: 61.2 MMHG
BH CV ECHO MEAS - MR MAX VEL: 391 CM/SEC
BH CV ECHO MEAS - MV A DUR: 0.17 SEC
BH CV ECHO MEAS - MV A MAX VEL: 85.9 CM/SEC
BH CV ECHO MEAS - MV DEC SLOPE: 535 CM/SEC^2
BH CV ECHO MEAS - MV DEC TIME: 0.14 SEC
BH CV ECHO MEAS - MV E MAX VEL: 115 CM/SEC
BH CV ECHO MEAS - MV E/A: 1.3
BH CV ECHO MEAS - MV MAX PG: 6.1 MMHG
BH CV ECHO MEAS - MV MEAN PG: 2 MMHG
BH CV ECHO MEAS - MV P1/2T MAX VEL: 128.5 CM/SEC
BH CV ECHO MEAS - MV P1/2T: 70.3 MSEC
BH CV ECHO MEAS - MV V2 MAX: 123 CM/SEC
BH CV ECHO MEAS - MV V2 MEAN: 62 CM/SEC
BH CV ECHO MEAS - MV V2 VTI: 48.2 CM
BH CV ECHO MEAS - MVA P1/2T LCG: 1.7 CM^2
BH CV ECHO MEAS - MVA(P1/2T): 3.1 CM^2
BH CV ECHO MEAS - MVA(VTI): 1.5 CM^2
BH CV ECHO MEAS - PA ACC TIME: 0.12 SEC
BH CV ECHO MEAS - PA MAX PG (FULL): 2 MMHG
BH CV ECHO MEAS - PA MAX PG: 3.9 MMHG
BH CV ECHO MEAS - PA PR(ACCEL): 23.7 MMHG
BH CV ECHO MEAS - PA V2 MAX: 98.6 CM/SEC
BH CV ECHO MEAS - RAP SYSTOLE: 3 MMHG
BH CV ECHO MEAS - RV MAX PG: 1.8 MMHG
BH CV ECHO MEAS - RV MEAN PG: 1 MMHG
BH CV ECHO MEAS - RV V1 MAX: 68 CM/SEC
BH CV ECHO MEAS - RV V1 MEAN: 50.5 CM/SEC
BH CV ECHO MEAS - RV V1 VTI: 17.2 CM
BH CV ECHO MEAS - RVSP: 39 MMHG
BH CV ECHO MEAS - SI(AO): 169.1 ML/M^2
BH CV ECHO MEAS - SI(CUBED): 69.7 ML/M^2
BH CV ECHO MEAS - SI(LVOT): 43.2 ML/M^2
BH CV ECHO MEAS - SI(MOD-SP2): 34.4 ML/M^2
BH CV ECHO MEAS - SI(MOD-SP4): 31.4 ML/M^2
BH CV ECHO MEAS - SI(TEICH): 54.6 ML/M^2
BH CV ECHO MEAS - SV(AO): 285.3 ML
BH CV ECHO MEAS - SV(CUBED): 117.5 ML
BH CV ECHO MEAS - SV(LVOT): 72.9 ML
BH CV ECHO MEAS - SV(MOD-SP2): 58 ML
BH CV ECHO MEAS - SV(MOD-SP4): 53 ML
BH CV ECHO MEAS - SV(TEICH): 92.1 ML
BH CV ECHO MEAS - TAPSE (>1.6): 2.7 CM
BH CV ECHO MEAS - TR MAX VEL: 306 CM/SEC
BH CV ECHO MEASUREMENTS AVERAGE E/E' RATIO: 12.11
BH CV XLRA - TDI S': 13 CM/SEC
BUN BLD-MCNC: 34 MG/DL (ref 8–23)
BUN/CREAT SERPL: 31.5 (ref 7–25)
CALCIUM SPEC-SCNC: 8.4 MG/DL (ref 8.6–10.5)
CHLORIDE SERPL-SCNC: 102 MMOL/L (ref 98–107)
CO2 SERPL-SCNC: 22.2 MMOL/L (ref 22–29)
CREAT BLD-MCNC: 1.08 MG/DL (ref 0.76–1.27)
DEPRECATED RDW RBC AUTO: 43.1 FL (ref 37–54)
EOSINOPHIL # BLD AUTO: 0 10*3/MM3 (ref 0–0.4)
EOSINOPHIL NFR BLD AUTO: 0 % (ref 0.3–6.2)
ERYTHROCYTE [DISTWIDTH] IN BLOOD BY AUTOMATED COUNT: 13.5 % (ref 12.3–15.4)
GFR SERPL CREATININE-BSD FRML MDRD: 70 ML/MIN/1.73
GLUCOSE BLD-MCNC: 141 MG/DL (ref 65–99)
GLUCOSE BLDC GLUCOMTR-MCNC: 124 MG/DL (ref 70–130)
GLUCOSE BLDC GLUCOMTR-MCNC: 137 MG/DL (ref 70–130)
GLUCOSE BLDC GLUCOMTR-MCNC: 138 MG/DL (ref 70–130)
GLUCOSE BLDC GLUCOMTR-MCNC: 143 MG/DL (ref 70–130)
HCO3 BLDA-SCNC: 25.8 MMOL/L (ref 22–28)
HCT VFR BLD AUTO: 31.7 % (ref 37.5–51)
HGB BLD-MCNC: 10.6 G/DL (ref 13–17.7)
HOROWITZ INDEX BLD+IHG-RTO: 100 %
IMM GRANULOCYTES # BLD AUTO: 0.09 10*3/MM3 (ref 0–0.05)
IMM GRANULOCYTES NFR BLD AUTO: 1.2 % (ref 0–0.5)
LEFT ATRIUM VOLUME INDEX: 34 ML/M2
LV EF 2D ECHO EST: 62 %
LYMPHOCYTES # BLD AUTO: 0.26 10*3/MM3 (ref 0.7–3.1)
LYMPHOCYTES NFR BLD AUTO: 3.5 % (ref 19.6–45.3)
MAGNESIUM SERPL-MCNC: 2.4 MG/DL (ref 1.6–2.4)
MCH RBC QN AUTO: 29.1 PG (ref 26.6–33)
MCHC RBC AUTO-ENTMCNC: 33.4 G/DL (ref 31.5–35.7)
MCV RBC AUTO: 87.1 FL (ref 79–97)
MODALITY: ABNORMAL
MONOCYTES # BLD AUTO: 0.3 10*3/MM3 (ref 0.1–0.9)
MONOCYTES NFR BLD AUTO: 4.1 % (ref 5–12)
NEUTROPHILS # BLD AUTO: 6.71 10*3/MM3 (ref 1.7–7)
NEUTROPHILS NFR BLD AUTO: 91.1 % (ref 42.7–76)
NRBC BLD AUTO-RTO: 0 /100 WBC (ref 0–0.2)
O2 A-A PPRESDIFF RESPIRATORY: 0.2 MMHG
PCO2 BLDA: 39.5 MM HG (ref 35–45)
PH BLDA: 7.42 PH UNITS (ref 7.35–7.45)
PLATELET # BLD AUTO: 242 10*3/MM3 (ref 140–450)
PMV BLD AUTO: 9.9 FL (ref 6–12)
PO2 BLDA: 138.5 MM HG (ref 80–100)
POTASSIUM BLD-SCNC: 4.6 MMOL/L (ref 3.5–5.2)
RBC # BLD AUTO: 3.64 10*6/MM3 (ref 4.14–5.8)
SAO2 % BLDCOA: 99.2 % (ref 92–99)
SODIUM BLD-SCNC: 138 MMOL/L (ref 136–145)
TOTAL RATE: 24 BREATHS/MINUTE
TROPONIN T SERPL-MCNC: <0.01 NG/ML (ref 0–0.03)
VANCOMYCIN TROUGH SERPL-MCNC: 14.4 MCG/ML (ref 5–20)
WBC NRBC COR # BLD: 7.37 10*3/MM3 (ref 3.4–10.8)

## 2019-12-21 PROCEDURE — 85025 COMPLETE CBC W/AUTO DIFF WBC: CPT | Performed by: INTERNAL MEDICINE

## 2019-12-21 PROCEDURE — 25010000002 ENOXAPARIN PER 10 MG: Performed by: INTERNAL MEDICINE

## 2019-12-21 PROCEDURE — 99232 SBSQ HOSP IP/OBS MODERATE 35: CPT | Performed by: INTERNAL MEDICINE

## 2019-12-21 PROCEDURE — 82962 GLUCOSE BLOOD TEST: CPT

## 2019-12-21 PROCEDURE — 71045 X-RAY EXAM CHEST 1 VIEW: CPT

## 2019-12-21 PROCEDURE — 25010000002 VANCOMYCIN PER 500 MG: Performed by: INTERNAL MEDICINE

## 2019-12-21 PROCEDURE — 36415 COLL VENOUS BLD VENIPUNCTURE: CPT | Performed by: INTERNAL MEDICINE

## 2019-12-21 PROCEDURE — 25010000002 METHYLPREDNISOLONE PER 40 MG: Performed by: INTERNAL MEDICINE

## 2019-12-21 PROCEDURE — 93005 ELECTROCARDIOGRAM TRACING: CPT | Performed by: INTERNAL MEDICINE

## 2019-12-21 PROCEDURE — 99253 IP/OBS CNSLTJ NEW/EST LOW 45: CPT | Performed by: INTERNAL MEDICINE

## 2019-12-21 PROCEDURE — 93010 ELECTROCARDIOGRAM REPORT: CPT | Performed by: INTERNAL MEDICINE

## 2019-12-21 PROCEDURE — 80048 BASIC METABOLIC PNL TOTAL CA: CPT | Performed by: INTERNAL MEDICINE

## 2019-12-21 PROCEDURE — 82803 BLOOD GASES ANY COMBINATION: CPT

## 2019-12-21 PROCEDURE — 93306 TTE W/DOPPLER COMPLETE: CPT

## 2019-12-21 PROCEDURE — 36600 WITHDRAWAL OF ARTERIAL BLOOD: CPT

## 2019-12-21 PROCEDURE — 80202 ASSAY OF VANCOMYCIN: CPT | Performed by: INTERNAL MEDICINE

## 2019-12-21 PROCEDURE — 84484 ASSAY OF TROPONIN QUANT: CPT | Performed by: INTERNAL MEDICINE

## 2019-12-21 PROCEDURE — 25010000002 CEFEPIME PER 500 MG: Performed by: INTERNAL MEDICINE

## 2019-12-21 PROCEDURE — 83735 ASSAY OF MAGNESIUM: CPT | Performed by: INTERNAL MEDICINE

## 2019-12-21 PROCEDURE — 94799 UNLISTED PULMONARY SVC/PX: CPT

## 2019-12-21 PROCEDURE — 93306 TTE W/DOPPLER COMPLETE: CPT | Performed by: INTERNAL MEDICINE

## 2019-12-21 RX ADMIN — VANCOMYCIN HYDROCHLORIDE 1000 MG: 1 INJECTION, SOLUTION INTRAVENOUS at 17:32

## 2019-12-21 RX ADMIN — SODIUM CHLORIDE, PRESERVATIVE FREE 10 ML: 5 INJECTION INTRAVENOUS at 11:10

## 2019-12-21 RX ADMIN — CEFEPIME HYDROCHLORIDE 2 G: 2 INJECTION, POWDER, FOR SOLUTION INTRAVENOUS at 04:50

## 2019-12-21 RX ADMIN — VANCOMYCIN HYDROCHLORIDE 1000 MG: 1 INJECTION, SOLUTION INTRAVENOUS at 05:43

## 2019-12-21 RX ADMIN — BUPRENORPHINE HYDROCHLORIDE, NALOXONE HYDROCHLORIDE 2 FILM: 8; 2 FILM, SOLUBLE BUCCAL; SUBLINGUAL at 17:32

## 2019-12-21 RX ADMIN — CEFEPIME HYDROCHLORIDE 2 G: 2 INJECTION, POWDER, FOR SOLUTION INTRAVENOUS at 20:42

## 2019-12-21 RX ADMIN — PANTOPRAZOLE SODIUM 40 MG: 40 TABLET, DELAYED RELEASE ORAL at 09:26

## 2019-12-21 RX ADMIN — ENOXAPARIN SODIUM 40 MG: 40 INJECTION SUBCUTANEOUS at 09:26

## 2019-12-21 RX ADMIN — CEFEPIME HYDROCHLORIDE 2 G: 2 INJECTION, POWDER, FOR SOLUTION INTRAVENOUS at 12:42

## 2019-12-21 RX ADMIN — METHYLPREDNISOLONE SODIUM SUCCINATE 40 MG: 40 INJECTION, POWDER, FOR SOLUTION INTRAMUSCULAR; INTRAVENOUS at 04:50

## 2019-12-21 RX ADMIN — SODIUM CHLORIDE, PRESERVATIVE FREE 10 ML: 5 INJECTION INTRAVENOUS at 20:43

## 2019-12-21 RX ADMIN — METHYLPREDNISOLONE SODIUM SUCCINATE 40 MG: 40 INJECTION, POWDER, FOR SOLUTION INTRAMUSCULAR; INTRAVENOUS at 17:32

## 2019-12-22 LAB
ANION GAP SERPL CALCULATED.3IONS-SCNC: 12.2 MMOL/L (ref 5–15)
ARTERIAL PATENCY WRIST A: ABNORMAL
ATMOSPHERIC PRESS: 757.3 MMHG
BASE EXCESS BLDA CALC-SCNC: 2.2 MMOL/L (ref 0–2)
BASOPHILS # BLD AUTO: 0.01 10*3/MM3 (ref 0–0.2)
BASOPHILS NFR BLD AUTO: 0.1 % (ref 0–1.5)
BDY SITE: ABNORMAL
BUN BLD-MCNC: 31 MG/DL (ref 8–23)
BUN/CREAT SERPL: 33 (ref 7–25)
CALCIUM SPEC-SCNC: 8.4 MG/DL (ref 8.6–10.5)
CHLORIDE SERPL-SCNC: 101 MMOL/L (ref 98–107)
CO2 SERPL-SCNC: 25.8 MMOL/L (ref 22–29)
CREAT BLD-MCNC: 0.94 MG/DL (ref 0.76–1.27)
DEPRECATED RDW RBC AUTO: 42.7 FL (ref 37–54)
EOSINOPHIL # BLD AUTO: 0 10*3/MM3 (ref 0–0.4)
EOSINOPHIL NFR BLD AUTO: 0 % (ref 0.3–6.2)
ERYTHROCYTE [DISTWIDTH] IN BLOOD BY AUTOMATED COUNT: 13.6 % (ref 12.3–15.4)
GFR SERPL CREATININE-BSD FRML MDRD: 82 ML/MIN/1.73
GLUCOSE BLD-MCNC: 118 MG/DL (ref 65–99)
GLUCOSE BLDC GLUCOMTR-MCNC: 118 MG/DL (ref 70–130)
GLUCOSE BLDC GLUCOMTR-MCNC: 158 MG/DL (ref 70–130)
GLUCOSE BLDC GLUCOMTR-MCNC: 187 MG/DL (ref 70–130)
GLUCOSE BLDC GLUCOMTR-MCNC: 337 MG/DL (ref 70–130)
HCO3 BLDA-SCNC: 26.3 MMOL/L (ref 22–28)
HCT VFR BLD AUTO: 31.9 % (ref 37.5–51)
HGB BLD-MCNC: 10.7 G/DL (ref 13–17.7)
HOROWITZ INDEX BLD+IHG-RTO: 50 %
IGA SERPL-MCNC: 104 MG/DL (ref 90–386)
IGG SERPL-MCNC: 497 MG/DL (ref 700–1600)
IGM SERPL-MCNC: 31 MG/DL (ref 20–172)
IMM GRANULOCYTES # BLD AUTO: 0.04 10*3/MM3 (ref 0–0.05)
IMM GRANULOCYTES NFR BLD AUTO: 0.6 % (ref 0–0.5)
LYMPHOCYTES # BLD AUTO: 0.29 10*3/MM3 (ref 0.7–3.1)
LYMPHOCYTES NFR BLD AUTO: 4.3 % (ref 19.6–45.3)
MCH RBC QN AUTO: 29 PG (ref 26.6–33)
MCHC RBC AUTO-ENTMCNC: 33.5 G/DL (ref 31.5–35.7)
MCV RBC AUTO: 86.4 FL (ref 79–97)
MODALITY: ABNORMAL
MONOCYTES # BLD AUTO: 0.24 10*3/MM3 (ref 0.1–0.9)
MONOCYTES NFR BLD AUTO: 3.6 % (ref 5–12)
NEUTROPHILS # BLD AUTO: 6.15 10*3/MM3 (ref 1.7–7)
NEUTROPHILS NFR BLD AUTO: 91.4 % (ref 42.7–76)
NRBC BLD AUTO-RTO: 0 /100 WBC (ref 0–0.2)
O2 A-A PPRESDIFF RESPIRATORY: 0.2 MMHG
PCO2 BLDA: 38.1 MM HG (ref 35–45)
PH BLDA: 7.45 PH UNITS (ref 7.35–7.45)
PLATELET # BLD AUTO: 236 10*3/MM3 (ref 140–450)
PMV BLD AUTO: 10.1 FL (ref 6–12)
PO2 BLDA: 79.9 MM HG (ref 80–100)
POTASSIUM BLD-SCNC: 4.8 MMOL/L (ref 3.5–5.2)
RBC # BLD AUTO: 3.69 10*6/MM3 (ref 4.14–5.8)
SAO2 % BLDCOA: 96.3 % (ref 92–99)
SODIUM BLD-SCNC: 139 MMOL/L (ref 136–145)
TOTAL IGE SMQN RAST: 58 IU/ML (ref 6–495)
TOTAL RATE: 24 BREATHS/MINUTE
TROPONIN T SERPL-MCNC: <0.01 NG/ML (ref 0–0.03)
WBC NRBC COR # BLD: 6.73 10*3/MM3 (ref 3.4–10.8)

## 2019-12-22 PROCEDURE — 25010000002 ENOXAPARIN PER 10 MG: Performed by: INTERNAL MEDICINE

## 2019-12-22 PROCEDURE — 94799 UNLISTED PULMONARY SVC/PX: CPT

## 2019-12-22 PROCEDURE — 82962 GLUCOSE BLOOD TEST: CPT

## 2019-12-22 PROCEDURE — 80048 BASIC METABOLIC PNL TOTAL CA: CPT | Performed by: INTERNAL MEDICINE

## 2019-12-22 PROCEDURE — 25010000002 CEFEPIME PER 500 MG: Performed by: INTERNAL MEDICINE

## 2019-12-22 PROCEDURE — 25010000002 METHYLPREDNISOLONE PER 40 MG: Performed by: INTERNAL MEDICINE

## 2019-12-22 PROCEDURE — 36600 WITHDRAWAL OF ARTERIAL BLOOD: CPT

## 2019-12-22 PROCEDURE — 82803 BLOOD GASES ANY COMBINATION: CPT

## 2019-12-22 PROCEDURE — 36415 COLL VENOUS BLD VENIPUNCTURE: CPT | Performed by: INTERNAL MEDICINE

## 2019-12-22 PROCEDURE — 84484 ASSAY OF TROPONIN QUANT: CPT | Performed by: INTERNAL MEDICINE

## 2019-12-22 PROCEDURE — 85025 COMPLETE CBC W/AUTO DIFF WBC: CPT | Performed by: INTERNAL MEDICINE

## 2019-12-22 PROCEDURE — 99231 SBSQ HOSP IP/OBS SF/LOW 25: CPT | Performed by: INTERNAL MEDICINE

## 2019-12-22 PROCEDURE — 99232 SBSQ HOSP IP/OBS MODERATE 35: CPT | Performed by: INTERNAL MEDICINE

## 2019-12-22 PROCEDURE — 25010000002 VANCOMYCIN PER 500 MG: Performed by: INTERNAL MEDICINE

## 2019-12-22 PROCEDURE — 25010000002 CEFEPIME 2 G/NS 100 ML SOLUTION: Performed by: INTERNAL MEDICINE

## 2019-12-22 RX ORDER — METHYLPREDNISOLONE SODIUM SUCCINATE 40 MG/ML
30 INJECTION, POWDER, LYOPHILIZED, FOR SOLUTION INTRAMUSCULAR; INTRAVENOUS EVERY 12 HOURS
Status: DISCONTINUED | OUTPATIENT
Start: 2019-12-22 | End: 2019-12-23

## 2019-12-22 RX ADMIN — VANCOMYCIN HYDROCHLORIDE 1000 MG: 1 INJECTION, SOLUTION INTRAVENOUS at 16:24

## 2019-12-22 RX ADMIN — SODIUM CHLORIDE, PRESERVATIVE FREE 10 ML: 5 INJECTION INTRAVENOUS at 09:06

## 2019-12-22 RX ADMIN — CEFEPIME HYDROCHLORIDE 2 G: 2 INJECTION, POWDER, FOR SOLUTION INTRAVENOUS at 21:47

## 2019-12-22 RX ADMIN — PANTOPRAZOLE SODIUM 40 MG: 40 TABLET, DELAYED RELEASE ORAL at 06:36

## 2019-12-22 RX ADMIN — BUPRENORPHINE HYDROCHLORIDE, NALOXONE HYDROCHLORIDE 2 FILM: 8; 2 FILM, SOLUBLE BUCCAL; SUBLINGUAL at 16:23

## 2019-12-22 RX ADMIN — CEFEPIME HYDROCHLORIDE 2 G: 2 INJECTION, POWDER, FOR SOLUTION INTRAVENOUS at 13:09

## 2019-12-22 RX ADMIN — CEFEPIME HYDROCHLORIDE 2 G: 2 INJECTION, POWDER, FOR SOLUTION INTRAVENOUS at 05:23

## 2019-12-22 RX ADMIN — METHYLPREDNISOLONE SODIUM SUCCINATE 40 MG: 40 INJECTION, POWDER, FOR SOLUTION INTRAMUSCULAR; INTRAVENOUS at 05:26

## 2019-12-22 RX ADMIN — METHYLPREDNISOLONE SODIUM SUCCINATE 30 MG: 40 INJECTION, POWDER, FOR SOLUTION INTRAMUSCULAR; INTRAVENOUS at 16:24

## 2019-12-22 RX ADMIN — ENOXAPARIN SODIUM 40 MG: 40 INJECTION SUBCUTANEOUS at 09:06

## 2019-12-22 RX ADMIN — VANCOMYCIN HYDROCHLORIDE 1000 MG: 1 INJECTION, SOLUTION INTRAVENOUS at 05:25

## 2019-12-23 ENCOUNTER — APPOINTMENT (OUTPATIENT)
Dept: GENERAL RADIOLOGY | Facility: HOSPITAL | Age: 59
End: 2019-12-23

## 2019-12-23 LAB
ANION GAP SERPL CALCULATED.3IONS-SCNC: 10.3 MMOL/L (ref 5–15)
BASOPHILS # BLD AUTO: 0 10*3/MM3 (ref 0–0.2)
BASOPHILS NFR BLD AUTO: 0 % (ref 0–1.5)
BUN BLD-MCNC: 24 MG/DL (ref 8–23)
BUN/CREAT SERPL: 32 (ref 7–25)
CALCIUM SPEC-SCNC: 8.1 MG/DL (ref 8.6–10.5)
CHLORIDE SERPL-SCNC: 102 MMOL/L (ref 98–107)
CO2 SERPL-SCNC: 25.7 MMOL/L (ref 22–29)
CREAT BLD-MCNC: 0.75 MG/DL (ref 0.76–1.27)
DEPRECATED RDW RBC AUTO: 41.7 FL (ref 37–54)
EOSINOPHIL # BLD AUTO: 0.01 10*3/MM3 (ref 0–0.4)
EOSINOPHIL NFR BLD AUTO: 0.2 % (ref 0.3–6.2)
ERYTHROCYTE [DISTWIDTH] IN BLOOD BY AUTOMATED COUNT: 13.2 % (ref 12.3–15.4)
GFR SERPL CREATININE-BSD FRML MDRD: 106 ML/MIN/1.73
GLUCOSE BLD-MCNC: 104 MG/DL (ref 65–99)
GLUCOSE BLDC GLUCOMTR-MCNC: 131 MG/DL (ref 70–130)
HCT VFR BLD AUTO: 32.3 % (ref 37.5–51)
HGB BLD-MCNC: 10.8 G/DL (ref 13–17.7)
IMM GRANULOCYTES # BLD AUTO: 0.07 10*3/MM3 (ref 0–0.05)
IMM GRANULOCYTES NFR BLD AUTO: 1.2 % (ref 0–0.5)
LYMPHOCYTES # BLD AUTO: 0.35 10*3/MM3 (ref 0.7–3.1)
LYMPHOCYTES NFR BLD AUTO: 6 % (ref 19.6–45.3)
MCH RBC QN AUTO: 29 PG (ref 26.6–33)
MCHC RBC AUTO-ENTMCNC: 33.4 G/DL (ref 31.5–35.7)
MCV RBC AUTO: 86.8 FL (ref 79–97)
MONOCYTES # BLD AUTO: 0.18 10*3/MM3 (ref 0.1–0.9)
MONOCYTES NFR BLD AUTO: 3.1 % (ref 5–12)
NEUTROPHILS # BLD AUTO: 5.18 10*3/MM3 (ref 1.7–7)
NEUTROPHILS NFR BLD AUTO: 89.5 % (ref 42.7–76)
NRBC BLD AUTO-RTO: 0 /100 WBC (ref 0–0.2)
PLATELET # BLD AUTO: 223 10*3/MM3 (ref 140–450)
PMV BLD AUTO: 10.2 FL (ref 6–12)
POTASSIUM BLD-SCNC: 4.6 MMOL/L (ref 3.5–5.2)
RBC # BLD AUTO: 3.72 10*6/MM3 (ref 4.14–5.8)
SODIUM BLD-SCNC: 138 MMOL/L (ref 136–145)
VANCOMYCIN TROUGH SERPL-MCNC: 13.3 MCG/ML (ref 5–20)
WBC NRBC COR # BLD: 5.79 10*3/MM3 (ref 3.4–10.8)

## 2019-12-23 PROCEDURE — 85025 COMPLETE CBC W/AUTO DIFF WBC: CPT | Performed by: INTERNAL MEDICINE

## 2019-12-23 PROCEDURE — 82962 GLUCOSE BLOOD TEST: CPT

## 2019-12-23 PROCEDURE — 94799 UNLISTED PULMONARY SVC/PX: CPT

## 2019-12-23 PROCEDURE — 71045 X-RAY EXAM CHEST 1 VIEW: CPT

## 2019-12-23 PROCEDURE — 25010000002 METHYLPREDNISOLONE PER 40 MG: Performed by: INTERNAL MEDICINE

## 2019-12-23 PROCEDURE — 80202 ASSAY OF VANCOMYCIN: CPT | Performed by: INTERNAL MEDICINE

## 2019-12-23 PROCEDURE — 36415 COLL VENOUS BLD VENIPUNCTURE: CPT | Performed by: INTERNAL MEDICINE

## 2019-12-23 PROCEDURE — 99232 SBSQ HOSP IP/OBS MODERATE 35: CPT | Performed by: NURSE PRACTITIONER

## 2019-12-23 PROCEDURE — 25010000002 ENOXAPARIN PER 10 MG: Performed by: INTERNAL MEDICINE

## 2019-12-23 PROCEDURE — 63710000001 PREDNISONE PER 1 MG: Performed by: INTERNAL MEDICINE

## 2019-12-23 PROCEDURE — 25010000002 CEFEPIME PER 500 MG: Performed by: INTERNAL MEDICINE

## 2019-12-23 PROCEDURE — 97161 PT EVAL LOW COMPLEX 20 MIN: CPT

## 2019-12-23 PROCEDURE — 80048 BASIC METABOLIC PNL TOTAL CA: CPT | Performed by: INTERNAL MEDICINE

## 2019-12-23 PROCEDURE — 99232 SBSQ HOSP IP/OBS MODERATE 35: CPT | Performed by: INTERNAL MEDICINE

## 2019-12-23 PROCEDURE — 25010000002 VANCOMYCIN PER 500 MG: Performed by: INTERNAL MEDICINE

## 2019-12-23 RX ORDER — PREDNISONE 20 MG/1
20 TABLET ORAL 2 TIMES DAILY WITH MEALS
Status: DISCONTINUED | OUTPATIENT
Start: 2019-12-23 | End: 2019-12-24 | Stop reason: HOSPADM

## 2019-12-23 RX ORDER — DOXYCYCLINE 100 MG/1
100 CAPSULE ORAL EVERY 12 HOURS SCHEDULED
Status: DISCONTINUED | OUTPATIENT
Start: 2019-12-23 | End: 2019-12-24 | Stop reason: HOSPADM

## 2019-12-23 RX ADMIN — ENOXAPARIN SODIUM 40 MG: 40 INJECTION SUBCUTANEOUS at 08:28

## 2019-12-23 RX ADMIN — DOXYCYCLINE 100 MG: 100 CAPSULE ORAL at 15:44

## 2019-12-23 RX ADMIN — METHYLPREDNISOLONE SODIUM SUCCINATE 30 MG: 40 INJECTION, POWDER, FOR SOLUTION INTRAMUSCULAR; INTRAVENOUS at 04:40

## 2019-12-23 RX ADMIN — BUPRENORPHINE HYDROCHLORIDE, NALOXONE HYDROCHLORIDE 2 FILM: 8; 2 FILM, SOLUBLE BUCCAL; SUBLINGUAL at 17:19

## 2019-12-23 RX ADMIN — SODIUM CHLORIDE, PRESERVATIVE FREE 10 ML: 5 INJECTION INTRAVENOUS at 08:28

## 2019-12-23 RX ADMIN — CEFEPIME HYDROCHLORIDE 2 G: 2 INJECTION, POWDER, FOR SOLUTION INTRAVENOUS at 04:43

## 2019-12-23 RX ADMIN — DOXYCYCLINE 100 MG: 100 CAPSULE ORAL at 23:59

## 2019-12-23 RX ADMIN — PANTOPRAZOLE SODIUM 40 MG: 40 TABLET, DELAYED RELEASE ORAL at 06:48

## 2019-12-23 RX ADMIN — PREDNISONE 20 MG: 20 TABLET ORAL at 15:44

## 2019-12-23 RX ADMIN — VANCOMYCIN HYDROCHLORIDE 1000 MG: 1 INJECTION, SOLUTION INTRAVENOUS at 05:47

## 2019-12-23 RX ADMIN — PREDNISONE 20 MG: 20 TABLET ORAL at 23:59

## 2019-12-24 VITALS
HEART RATE: 56 BPM | RESPIRATION RATE: 18 BRPM | SYSTOLIC BLOOD PRESSURE: 120 MMHG | HEIGHT: 64 IN | BODY MASS INDEX: 26.85 KG/M2 | OXYGEN SATURATION: 95 % | TEMPERATURE: 98 F | DIASTOLIC BLOOD PRESSURE: 90 MMHG | WEIGHT: 157.3 LBS

## 2019-12-24 LAB
ANION GAP SERPL CALCULATED.3IONS-SCNC: 8.5 MMOL/L (ref 5–15)
BACTERIA SPEC AEROBE CULT: NORMAL
BASOPHILS # BLD AUTO: 0.01 10*3/MM3 (ref 0–0.2)
BASOPHILS NFR BLD AUTO: 0.2 % (ref 0–1.5)
BUN BLD-MCNC: 21 MG/DL (ref 8–23)
BUN/CREAT SERPL: 23.9 (ref 7–25)
CALCIUM SPEC-SCNC: 8.3 MG/DL (ref 8.6–10.5)
CHLORIDE SERPL-SCNC: 99 MMOL/L (ref 98–107)
CO2 SERPL-SCNC: 29.5 MMOL/L (ref 22–29)
CREAT BLD-MCNC: 0.88 MG/DL (ref 0.76–1.27)
DEPRECATED RDW RBC AUTO: 40.8 FL (ref 37–54)
EOSINOPHIL # BLD AUTO: 0.07 10*3/MM3 (ref 0–0.4)
EOSINOPHIL NFR BLD AUTO: 1.1 % (ref 0.3–6.2)
ERYTHROCYTE [DISTWIDTH] IN BLOOD BY AUTOMATED COUNT: 13.1 % (ref 12.3–15.4)
GFR SERPL CREATININE-BSD FRML MDRD: 88 ML/MIN/1.73
GLUCOSE BLD-MCNC: 120 MG/DL (ref 65–99)
HCT VFR BLD AUTO: 31.2 % (ref 37.5–51)
HGB BLD-MCNC: 10.3 G/DL (ref 13–17.7)
IMM GRANULOCYTES # BLD AUTO: 0.07 10*3/MM3 (ref 0–0.05)
IMM GRANULOCYTES NFR BLD AUTO: 1.1 % (ref 0–0.5)
LYMPHOCYTES # BLD AUTO: 0.37 10*3/MM3 (ref 0.7–3.1)
LYMPHOCYTES NFR BLD AUTO: 5.7 % (ref 19.6–45.3)
MCH RBC QN AUTO: 28.3 PG (ref 26.6–33)
MCHC RBC AUTO-ENTMCNC: 33 G/DL (ref 31.5–35.7)
MCV RBC AUTO: 85.7 FL (ref 79–97)
MONOCYTES # BLD AUTO: 0.22 10*3/MM3 (ref 0.1–0.9)
MONOCYTES NFR BLD AUTO: 3.4 % (ref 5–12)
NEUTROPHILS # BLD AUTO: 5.73 10*3/MM3 (ref 1.7–7)
NEUTROPHILS NFR BLD AUTO: 88.5 % (ref 42.7–76)
NRBC BLD AUTO-RTO: 0.2 /100 WBC (ref 0–0.2)
PLATELET # BLD AUTO: 257 10*3/MM3 (ref 140–450)
PMV BLD AUTO: 10.5 FL (ref 6–12)
POTASSIUM BLD-SCNC: 4.9 MMOL/L (ref 3.5–5.2)
RBC # BLD AUTO: 3.64 10*6/MM3 (ref 4.14–5.8)
SODIUM BLD-SCNC: 137 MMOL/L (ref 136–145)
WBC NRBC COR # BLD: 6.47 10*3/MM3 (ref 3.4–10.8)

## 2019-12-24 PROCEDURE — 63710000001 PREDNISONE PER 1 MG: Performed by: INTERNAL MEDICINE

## 2019-12-24 PROCEDURE — 80048 BASIC METABOLIC PNL TOTAL CA: CPT | Performed by: INTERNAL MEDICINE

## 2019-12-24 PROCEDURE — 25010000002 ENOXAPARIN PER 10 MG: Performed by: INTERNAL MEDICINE

## 2019-12-24 PROCEDURE — 99231 SBSQ HOSP IP/OBS SF/LOW 25: CPT | Performed by: NURSE PRACTITIONER

## 2019-12-24 PROCEDURE — 85025 COMPLETE CBC W/AUTO DIFF WBC: CPT | Performed by: INTERNAL MEDICINE

## 2019-12-24 RX ORDER — PREDNISONE 10 MG/1
TABLET ORAL
Qty: 31 TABLET | Refills: 0 | Status: SHIPPED | OUTPATIENT
Start: 2019-12-24 | End: 2020-01-17

## 2019-12-24 RX ORDER — DOXYCYCLINE 100 MG/1
100 CAPSULE ORAL EVERY 12 HOURS SCHEDULED
Qty: 7 CAPSULE | Refills: 0 | Status: SHIPPED | OUTPATIENT
Start: 2019-12-24 | End: 2019-12-28

## 2019-12-24 RX ADMIN — SODIUM CHLORIDE, PRESERVATIVE FREE 10 ML: 5 INJECTION INTRAVENOUS at 00:00

## 2019-12-24 RX ADMIN — ENOXAPARIN SODIUM 40 MG: 40 INJECTION SUBCUTANEOUS at 08:49

## 2019-12-24 RX ADMIN — PANTOPRAZOLE SODIUM 40 MG: 40 TABLET, DELAYED RELEASE ORAL at 06:20

## 2019-12-24 RX ADMIN — DOXYCYCLINE 100 MG: 100 CAPSULE ORAL at 08:49

## 2019-12-24 RX ADMIN — SODIUM CHLORIDE, PRESERVATIVE FREE 10 ML: 5 INJECTION INTRAVENOUS at 08:50

## 2019-12-24 RX ADMIN — PREDNISONE 20 MG: 20 TABLET ORAL at 08:49

## 2019-12-25 ENCOUNTER — READMISSION MANAGEMENT (OUTPATIENT)
Dept: CALL CENTER | Facility: HOSPITAL | Age: 59
End: 2019-12-25

## 2019-12-25 NOTE — OUTREACH NOTE
Prep Survey      Responses   Facility patient discharged from?  [facility]   Is patient eligible?  Yes   Discharge diagnosis  Acute respiratory failure with hypoxia    Does the patient have one of the following disease processes/diagnoses(primary or secondary)?  COPD/Pneumonia   Does the patient have Home health ordered?  No   Is there a DME ordered?  No   Prep survey completed?  Yes          Pamela Lackey RN

## 2019-12-27 ENCOUNTER — READMISSION MANAGEMENT (OUTPATIENT)
Dept: CALL CENTER | Facility: HOSPITAL | Age: 59
End: 2019-12-27

## 2019-12-27 NOTE — OUTREACH NOTE
COPD/PN Week 1 Survey      Responses   Facility patient discharged from?  [facility]   Does the patient have one of the following disease processes/diagnoses(primary or secondary)?  COPD/Pneumonia   Is there a successful TCM telephone encounter documented?  No   Week 1 attempt successful?  No   Unsuccessful attempts  Attempt 1          Reza Heath RN

## 2020-01-03 ENCOUNTER — READMISSION MANAGEMENT (OUTPATIENT)
Dept: CALL CENTER | Facility: HOSPITAL | Age: 60
End: 2020-01-03

## 2020-01-03 NOTE — OUTREACH NOTE
COPD/PN Week 2 Survey      Responses   Facility patient discharged from?  [facility]   Does the patient have one of the following disease processes/diagnoses(primary or secondary)?  COPD/Pneumonia   Week 2 attempt successful?  Yes   Call start time  1603   Call end time  1619   Discharge diagnosis  Acute respiratory failure with hypoxia,  pneumonia,  ARDS   Meds reviewed with patient/caregiver?  Yes   Is the patient having any side effects they believe may be caused by any medication additions or changes?  No   Does the patient have all medications ordered at discharge?  Yes   Is the patient taking all medications as directed (includes completed medication regime)?  Yes   Comments regarding appointments  Appointment with pulmonology on 1/21/20,  Appointment with oncology on 1/17/20   Does the patient have a primary care provider?   Yes   Does the patient have an appointment with their PCP or pulmonologist within 7 days of discharge?  Greater than 7 days   Comments regarding PCP  Appointment with PCP 1/20/20   What is preventing the patient from scheduling follow up appointments within 7 days of discharge?  Earlier appointment not available   Nursing Interventions  Verified appointment date/time/provider   Has the patient kept scheduled appointments due by today?  N/A   Psychosocial issues?  No   Did the patient receive a copy of their discharge instructions?  Yes   Nursing interventions  Educated on MyChart, Reviewed instructions with patient   What is the patient's perception of their health status since discharge?  Improving [Pt is still tired. He is walking 4 miles per day.]   Nursing Interventions  Nurse provided patient education   Are the patient's immunizations up to date?   Yes   Nursing interventions  Advised patient to discuss with provider at next visit   If the patient is a current smoker, are they able to teach back resources for cessation?  -- [Pt is a nonsmoker]   Is the patient/caregiver able to  teach back the hierarchy of who to call/visit for symptoms/problems? PCP, Specialist, Home health nurse, Urgent Care, ED, 911  Yes   Is the patient able to teach back COPD zones?  No   Is the patient/caregiver able to teach back signs and symptoms of worsening condition:  Fever/chills, Shortness of breath, Chest pain   Is the patient/caregiver able to teach back importance of completing antibiotic course of treatment?  Yes   Week 2 call completed?  Yes          Maria Luisa Pelaez RN

## 2020-01-11 ENCOUNTER — READMISSION MANAGEMENT (OUTPATIENT)
Dept: CALL CENTER | Facility: HOSPITAL | Age: 60
End: 2020-01-11

## 2020-01-11 NOTE — OUTREACH NOTE
COPD/PN Week 3 Survey      Responses   Facility patient discharged from?  [facility]   Does the patient have one of the following disease processes/diagnoses(primary or secondary)?  COPD/Pneumonia   Was the primary reason for admission:  Pneumonia   Week 3 attempt successful?  Yes   Call start time  1622   Call end time  1624   Discharge diagnosis  Acute respiratory failure with hypoxia,  pneumonia,  ARDS   Meds reviewed with patient/caregiver?  Yes   Is the patient taking all medications as directed (includes completed medication regime)?  Yes   Comments regarding PCP  1/17/20 and 1/21/20   Has the patient kept scheduled appointments due by today?  Yes   Psychosocial issues?  No   What is the patient's perception of their health status since discharge?  Improving   Is the patient/caregiver able to teach back signs and symptoms of worsening condition:  Fever/chills, Shortness of breath, Chest pain   Is the patient/caregiver able to teach back importance of completing antibiotic course of treatment?  Yes   Week 3 call completed?  Yes   Revoked  No further contact(revokes)-requires comment   Graduated/Revoked comments  Pt requests no more calls          Maria Luisa Pelaez RN

## 2020-01-15 DIAGNOSIS — C91.41 HAIRY CELL LEUKEMIA, IN REMISSION (HCC): Primary | ICD-10-CM

## 2020-01-17 ENCOUNTER — OFFICE VISIT (OUTPATIENT)
Dept: ONCOLOGY | Facility: CLINIC | Age: 60
End: 2020-01-17

## 2020-01-17 ENCOUNTER — LAB (OUTPATIENT)
Dept: LAB | Facility: HOSPITAL | Age: 60
End: 2020-01-17

## 2020-01-17 VITALS
RESPIRATION RATE: 12 BRPM | TEMPERATURE: 97.8 F | HEART RATE: 55 BPM | HEIGHT: 64 IN | SYSTOLIC BLOOD PRESSURE: 144 MMHG | BODY MASS INDEX: 25.29 KG/M2 | WEIGHT: 148.1 LBS | DIASTOLIC BLOOD PRESSURE: 79 MMHG | OXYGEN SATURATION: 99 %

## 2020-01-17 DIAGNOSIS — D80.1 HYPOGAMMAGLOBULINEMIA (HCC): ICD-10-CM

## 2020-01-17 DIAGNOSIS — C91.41 HAIRY CELL LEUKEMIA, IN REMISSION (HCC): Primary | ICD-10-CM

## 2020-01-17 DIAGNOSIS — C91.41 HAIRY CELL LEUKEMIA, IN REMISSION (HCC): ICD-10-CM

## 2020-01-17 LAB
BASOPHILS # BLD AUTO: 0.01 10*3/MM3 (ref 0–0.2)
BASOPHILS NFR BLD AUTO: 0.2 % (ref 0–1.5)
DEPRECATED RDW RBC AUTO: 42.3 FL (ref 37–54)
EOSINOPHIL # BLD AUTO: 0.16 10*3/MM3 (ref 0–0.4)
EOSINOPHIL NFR BLD AUTO: 3 % (ref 0.3–6.2)
ERYTHROCYTE [DISTWIDTH] IN BLOOD BY AUTOMATED COUNT: 13.7 % (ref 12.3–15.4)
HCT VFR BLD AUTO: 34.9 % (ref 37.5–51)
HGB BLD-MCNC: 11.9 G/DL (ref 13–17.7)
IMM GRANULOCYTES # BLD AUTO: 0.02 10*3/MM3 (ref 0–0.05)
IMM GRANULOCYTES NFR BLD AUTO: 0.4 % (ref 0–0.5)
LYMPHOCYTES # BLD AUTO: 0.77 10*3/MM3 (ref 0.7–3.1)
LYMPHOCYTES NFR BLD AUTO: 14.6 % (ref 19.6–45.3)
MCH RBC QN AUTO: 29.2 PG (ref 26.6–33)
MCHC RBC AUTO-ENTMCNC: 34.1 G/DL (ref 31.5–35.7)
MCV RBC AUTO: 85.7 FL (ref 79–97)
MONOCYTES # BLD AUTO: 0.2 10*3/MM3 (ref 0.1–0.9)
MONOCYTES NFR BLD AUTO: 3.8 % (ref 5–12)
NEUTROPHILS # BLD AUTO: 4.12 10*3/MM3 (ref 1.7–7)
NEUTROPHILS NFR BLD AUTO: 78 % (ref 42.7–76)
NRBC BLD AUTO-RTO: 0 /100 WBC (ref 0–0.2)
PLATELET # BLD AUTO: 186 10*3/MM3 (ref 140–450)
PMV BLD AUTO: 10.3 FL (ref 6–12)
RBC # BLD AUTO: 4.07 10*6/MM3 (ref 4.14–5.8)
WBC NRBC COR # BLD: 5.28 10*3/MM3 (ref 3.4–10.8)

## 2020-01-17 PROCEDURE — 36415 COLL VENOUS BLD VENIPUNCTURE: CPT

## 2020-01-17 PROCEDURE — 85025 COMPLETE CBC W/AUTO DIFF WBC: CPT

## 2020-01-17 PROCEDURE — 99214 OFFICE O/P EST MOD 30 MIN: CPT | Performed by: INTERNAL MEDICINE

## 2020-01-20 RX ORDER — OSELTAMIVIR PHOSPHATE 75 MG/1
75 CAPSULE ORAL 2 TIMES DAILY
Qty: 10 CAPSULE | Refills: 1 | Status: SHIPPED | OUTPATIENT
Start: 2020-01-20 | End: 2020-02-19 | Stop reason: SDUPTHER

## 2020-01-20 RX ORDER — CLARITHROMYCIN 500 MG/1
500 TABLET, COATED ORAL 2 TIMES DAILY
Qty: 20 TABLET | Refills: 1 | Status: SHIPPED | OUTPATIENT
Start: 2020-01-20 | End: 2020-02-20

## 2020-01-21 DIAGNOSIS — R06.02 SHORTNESS OF BREATH: ICD-10-CM

## 2020-01-21 DIAGNOSIS — R94.31 ABNORMAL EKG: Primary | ICD-10-CM

## 2020-02-17 ENCOUNTER — HOSPITAL ENCOUNTER (OUTPATIENT)
Dept: CARDIOLOGY | Facility: HOSPITAL | Age: 60
Discharge: HOME | End: 2020-02-17
Admitting: NURSE PRACTITIONER

## 2020-02-17 VITALS — HEART RATE: 58 BPM | SYSTOLIC BLOOD PRESSURE: 130 MMHG | DIASTOLIC BLOOD PRESSURE: 76 MMHG

## 2020-02-17 DIAGNOSIS — R94.31 ABNORMAL EKG: ICD-10-CM

## 2020-02-17 DIAGNOSIS — R06.02 SHORTNESS OF BREATH: ICD-10-CM

## 2020-02-17 PROCEDURE — 93018 CV STRESS TEST I&R ONLY: CPT | Performed by: INTERNAL MEDICINE

## 2020-02-17 PROCEDURE — 93017 CV STRESS TEST TRACING ONLY: CPT

## 2020-02-17 PROCEDURE — 93016 CV STRESS TEST SUPVJ ONLY: CPT | Performed by: INTERNAL MEDICINE

## 2020-02-18 ENCOUNTER — LAB (OUTPATIENT)
Dept: LAB | Facility: HOSPITAL | Age: 60
End: 2020-02-18

## 2020-02-18 DIAGNOSIS — D80.1 HYPOGAMMAGLOBULINEMIA (HCC): ICD-10-CM

## 2020-02-18 DIAGNOSIS — C91.41 HAIRY CELL LEUKEMIA, IN REMISSION (HCC): ICD-10-CM

## 2020-02-18 PROCEDURE — 36415 COLL VENOUS BLD VENIPUNCTURE: CPT

## 2020-02-19 ENCOUNTER — TELEPHONE (OUTPATIENT)
Dept: INTERNAL MEDICINE | Facility: CLINIC | Age: 60
End: 2020-02-19

## 2020-02-19 LAB
ALBUMIN SERPL-MCNC: 4.2 G/DL (ref 2.9–4.4)
ALBUMIN/GLOB SERPL: 1.8 {RATIO} (ref 0.7–1.7)
ALPHA1 GLOB FLD ELPH-MCNC: 0.2 G/DL (ref 0–0.4)
ALPHA2 GLOB SERPL ELPH-MCNC: 0.6 G/DL (ref 0.4–1)
B-GLOBULIN SERPL ELPH-MCNC: 0.9 G/DL (ref 0.7–1.3)
GAMMA GLOB SERPL ELPH-MCNC: 0.6 G/DL (ref 0.4–1.8)
GLOBULIN SER CALC-MCNC: 2.4 G/DL (ref 2.2–3.9)
IGA SERPL-MCNC: 88 MG/DL (ref 90–386)
IGG SERPL-MCNC: 628 MG/DL (ref 700–1600)
IGG SERPL-MCNC: 663 MG/DL (ref 700–1600)
IGG1 SER-MCNC: 271 MG/DL (ref 248–810)
IGG2 SER-MCNC: 196 MG/DL (ref 130–555)
IGG3 SER-MCNC: 23 MG/DL (ref 15–102)
IGG4 SER-MCNC: 87 MG/DL (ref 2–96)
IGM SERPL-MCNC: 28 MG/DL (ref 20–172)
INTERPRETATION SERPL IEP-IMP: ABNORMAL
KAPPA LC SERPL-MCNC: 8.7 MG/L (ref 3.3–19.4)
KAPPA LC/LAMBDA SER: 0.84 {RATIO} (ref 0.26–1.65)
LAMBDA LC FREE SERPL-MCNC: 10.4 MG/L (ref 5.7–26.3)
Lab: ABNORMAL
M-SPIKE: ABNORMAL G/DL
PROT SERPL-MCNC: 6.6 G/DL (ref 6–8.5)

## 2020-02-19 RX ORDER — OSELTAMIVIR PHOSPHATE 75 MG/1
75 CAPSULE ORAL 2 TIMES DAILY
Qty: 10 CAPSULE | Refills: 0 | Status: SHIPPED | OUTPATIENT
Start: 2020-02-19 | End: 2020-02-20

## 2020-02-19 NOTE — TELEPHONE ENCOUNTER
Pt called and stated his wife was just in for an OV and was tested for Flu. Test came back negative but was told it was probably still the flu. Pt is wanting to know if he can have tamaflu and if he should start it tonight. Pt has appt at 10:45 in the morning.    Please advise

## 2020-02-20 ENCOUNTER — OFFICE VISIT (OUTPATIENT)
Dept: INTERNAL MEDICINE | Facility: CLINIC | Age: 60
End: 2020-02-20

## 2020-02-20 VITALS
DIASTOLIC BLOOD PRESSURE: 80 MMHG | HEIGHT: 64 IN | SYSTOLIC BLOOD PRESSURE: 100 MMHG | BODY MASS INDEX: 24.92 KG/M2 | OXYGEN SATURATION: 99 % | WEIGHT: 146 LBS | HEART RATE: 63 BPM

## 2020-02-20 DIAGNOSIS — N52.01 ERECTILE DYSFUNCTION DUE TO ARTERIAL INSUFFICIENCY: ICD-10-CM

## 2020-02-20 DIAGNOSIS — D80.1 HYPOGAMMAGLOBULINEMIA (HCC): ICD-10-CM

## 2020-02-20 DIAGNOSIS — C91.41 HAIRY CELL LEUKEMIA, IN REMISSION (HCC): Primary | ICD-10-CM

## 2020-02-20 DIAGNOSIS — E55.9 VITAMIN D DEFICIENCY: ICD-10-CM

## 2020-02-20 DIAGNOSIS — D69.6 THROMBOCYTOPENIA (HCC): ICD-10-CM

## 2020-02-20 LAB
25(OH)D3+25(OH)D2 SERPL-MCNC: 41.8 NG/ML (ref 30–100)
BASOPHILS # BLD AUTO: 0.01 10*3/MM3 (ref 0–0.2)
BASOPHILS NFR BLD AUTO: 0.3 % (ref 0–1.5)
BH CV STRESS BP STAGE 1: NORMAL
BH CV STRESS BP STAGE 2: NORMAL
BH CV STRESS BP STAGE 3: NORMAL
BH CV STRESS BP STAGE 4: NORMAL
BH CV STRESS DURATION MIN STAGE 1: 3
BH CV STRESS DURATION MIN STAGE 2: 3
BH CV STRESS DURATION MIN STAGE 3: 3
BH CV STRESS DURATION MIN STAGE 4: 3
BH CV STRESS DURATION SEC STAGE 1: 0
BH CV STRESS DURATION SEC STAGE 2: 0
BH CV STRESS DURATION SEC STAGE 3: 0
BH CV STRESS DURATION SEC STAGE 4: 0
BH CV STRESS GRADE STAGE 1: 10
BH CV STRESS GRADE STAGE 2: 12
BH CV STRESS GRADE STAGE 3: 14
BH CV STRESS GRADE STAGE 4: 16
BH CV STRESS HR STAGE 1: 76
BH CV STRESS HR STAGE 2: 94
BH CV STRESS HR STAGE 3: 110
BH CV STRESS HR STAGE 4: 138
BH CV STRESS METS STAGE 1: 4.6
BH CV STRESS METS STAGE 2: 7.1
BH CV STRESS METS STAGE 3: 10.2
BH CV STRESS METS STAGE 4: 12.1
BH CV STRESS PROTOCOL 1: NORMAL
BH CV STRESS RECOVERY BP: NORMAL MMHG
BH CV STRESS RECOVERY HR: 68 BPM
BH CV STRESS SPEED STAGE 1: 1.7
BH CV STRESS SPEED STAGE 2: 2.5
BH CV STRESS SPEED STAGE 3: 3.4
BH CV STRESS SPEED STAGE 4: 4.2
BH CV STRESS STAGE 1: 1
BH CV STRESS STAGE 2: 2
BH CV STRESS STAGE 3: 3
BH CV STRESS STAGE 4: 4
EOSINOPHIL # BLD AUTO: 0.08 10*3/MM3 (ref 0–0.4)
EOSINOPHIL NFR BLD AUTO: 2.1 % (ref 0.3–6.2)
ERYTHROCYTE [DISTWIDTH] IN BLOOD BY AUTOMATED COUNT: 14.1 % (ref 12.3–15.4)
HCT VFR BLD AUTO: 40.3 % (ref 37.5–51)
HGB BLD-MCNC: 13 G/DL (ref 13–17.7)
IMM GRANULOCYTES # BLD AUTO: 0 10*3/MM3 (ref 0–0.05)
IMM GRANULOCYTES NFR BLD AUTO: 0 % (ref 0–0.5)
LYMPHOCYTES # BLD AUTO: 0.73 10*3/MM3 (ref 0.7–3.1)
LYMPHOCYTES NFR BLD AUTO: 19 % (ref 19.6–45.3)
MAXIMAL PREDICTED HEART RATE: 160 BPM
MCH RBC QN AUTO: 28.6 PG (ref 26.6–33)
MCHC RBC AUTO-ENTMCNC: 32.3 G/DL (ref 31.5–35.7)
MCV RBC AUTO: 88.6 FL (ref 79–97)
MONOCYTES # BLD AUTO: 0.2 10*3/MM3 (ref 0.1–0.9)
MONOCYTES NFR BLD AUTO: 5.2 % (ref 5–12)
NEUTROPHILS # BLD AUTO: 2.83 10*3/MM3 (ref 1.7–7)
NEUTROPHILS NFR BLD AUTO: 73.4 % (ref 42.7–76)
NRBC BLD AUTO-RTO: 0 /100 WBC (ref 0–0.2)
PERCENT MAX PREDICTED HR: 89.38 %
PLATELET # BLD AUTO: 185 10*3/MM3 (ref 140–450)
RBC # BLD AUTO: 4.55 10*6/MM3 (ref 4.14–5.8)
STRESS BASELINE BP: NORMAL MMHG
STRESS BASELINE HR: 50 BPM
STRESS PERCENT HR: 105 %
STRESS POST ESTIMATED WORKLOAD: 12.2 METS
STRESS POST EXERCISE DUR MIN: 12 MIN
STRESS POST EXERCISE DUR SEC: 0 SEC
STRESS POST PEAK BP: NORMAL MMHG
STRESS POST PEAK HR: 143 BPM
STRESS TARGET HR: 136 BPM
WBC # BLD AUTO: 3.85 10*3/MM3 (ref 3.4–10.8)

## 2020-02-20 PROCEDURE — 99214 OFFICE O/P EST MOD 30 MIN: CPT | Performed by: INTERNAL MEDICINE

## 2020-02-20 NOTE — PROGRESS NOTES
"Subjective   Solo Morales is a 59 y.o. male.  Patient presents with chief complaint of hairy cell leukemia that currently is in remission, documented immunodeficiency in the past, status post hospitalization for severe respiratory infection that led to a pneumonia and respiratory failure, chronic thrombocytopenia and anemia, vitamin D deficiency, and recent exposure to the influenza virus for which she has been placed on Tamiflu prophylactically, here for evaluation and treatment and lab check.  Fortunately the patient takes a very active hand and his own health he exercises regularly follows a good diet and is excellent about follow-up with his physicians.  He is working closely with his hematologist who recently checked his immunoglobulins and may be getting a immunoglobulin infusion in the near future.      /80   Pulse 63   Ht 161.3 cm (63.5\")   Wt 66.2 kg (146 lb)   SpO2 99%   BMI 25.45 kg/m²     Body mass index is 25.45 kg/m².    History of Present Illness recovering well from his pneumonia    The following portions of the patient's history were reviewed and updated as appropriate: allergies, current medications, past family history, past medical history, past social history, past surgical history and problem list.    Review of Systems   Constitutional: Negative.    HENT: Negative.    Respiratory: Negative.    Cardiovascular: Negative.    Gastrointestinal: Negative.    Musculoskeletal: Negative.    Neurological: Negative.    Psychiatric/Behavioral: Negative.        Objective   Physical Exam   Constitutional: He is oriented to person, place, and time. He appears well-developed and well-nourished.   HENT:   Head: Normocephalic and atraumatic.   Cardiovascular: Normal rate, regular rhythm and normal heart sounds.   Pulmonary/Chest: Effort normal and breath sounds normal.   Abdominal: Soft. Bowel sounds are normal.   Musculoskeletal: Normal range of motion.   Neurological: He is alert and oriented to " person, place, and time.   Psychiatric: He has a normal mood and affect. His behavior is normal.   Nursing note and vitals reviewed.        Assessment/Plan   Solo was seen today for hairy cell leukemia and erectile dysfunction.    Diagnoses and all orders for this visit:    Hairy cell leukemia, in remission (CMS/HCC)  Comments:  stable, for now    Thrombocytopenia (CMS/HCC)  Comments:  stable for now  Orders:  -     CBC w AUTO Differential; Future  -     CBC w AUTO Differential    Hypogammaglobulinemia (CMS/HCC)  Comments:  recent check was normal    Vitamin D deficiency  Comments:  continue daily vitamin D  Orders:  -     Vitamin D 25 hydroxy; Future  -     Vitamin D 25 hydroxy    Erectile dysfunction due to arterial insufficiency  Comments:  levitra prn

## 2020-02-27 ENCOUNTER — TELEPHONE (OUTPATIENT)
Dept: INTERNAL MEDICINE | Facility: CLINIC | Age: 60
End: 2020-02-27

## 2020-02-27 ENCOUNTER — TELEPHONE (OUTPATIENT)
Dept: CARDIOLOGY | Facility: CLINIC | Age: 60
End: 2020-02-27

## 2020-02-27 NOTE — TELEPHONE ENCOUNTER
PT CALLED AND REQUESTED AN RX FOR A CREAM FOR ATHLETES FOOT       PLEASE ADVISE    PT CALL BACK   [phone]    [address]

## 2020-02-27 NOTE — TELEPHONE ENCOUNTER
Advised pt to use OTC ATHLETES FOOT creams. He will try it and if not helping will call us back for prescription.

## 2020-02-27 NOTE — TELEPHONE ENCOUNTER
----- Message from KONSTANTIN Cervantes sent at 2/27/2020  2:20 PM EST -----  Please let him know his stress test was negative for ischemia.    Thank you Cash

## 2020-03-05 ENCOUNTER — OFFICE VISIT (OUTPATIENT)
Dept: CARDIOLOGY | Facility: CLINIC | Age: 60
End: 2020-03-05

## 2020-03-05 VITALS
DIASTOLIC BLOOD PRESSURE: 75 MMHG | HEART RATE: 51 BPM | WEIGHT: 151 LBS | HEIGHT: 64 IN | BODY MASS INDEX: 25.78 KG/M2 | SYSTOLIC BLOOD PRESSURE: 139 MMHG

## 2020-03-05 DIAGNOSIS — I25.10 CORONARY ARTERY DISEASE INVOLVING NATIVE CORONARY ARTERY OF NATIVE HEART WITHOUT ANGINA PECTORIS: Primary | ICD-10-CM

## 2020-03-05 PROCEDURE — 99213 OFFICE O/P EST LOW 20 MIN: CPT | Performed by: INTERNAL MEDICINE

## 2020-03-05 NOTE — PROGRESS NOTES
Subjective:        Solo Morales is a 59 y.o. male who here for follow up    CC  HOSP FOLLOW UP  HPI  59-year-old male with known history of the coronary artery disease, recently was seen in the hospital because of the ARDS, recently underwent a stress test which is normal    Patient denies any chest pains or tightness in the chest     Problem List Items Addressed This Visit        Cardiovascular and Mediastinum    Coronary artery disease involving native coronary artery of native heart without angina pectoris - Primary        .Interpretation Summary        · Stress Findings: No ECG evidence of myocardial ischemia  · Negative clinical evidence of myocardial ischemia. Findings consistent with a normal ECG stress test     nterpretation Summary     · Left ventricular systolic function is normal. Calculated EF = 62%. Estimated EF was in agreement with the calculated EF. Estimated EF = 62%. Normal left ventricular cavity size and wall thickness noted. All left ventricular wall segments contract normally. Left ventricular diastolic function is normal.  · Mild MAC is present. Trace mitral valve regurgitation is present  · Mild tricuspid valve regurgitation is present. Estimated right ventricular systolic pressure from tricuspid regurgitation is mildly elevated (35-45 mmHg). Calculated right ventricular systolic pressure from tricuspid regurgitation is 39 mmHg.            The following portions of the patient's history were reviewed and updated as appropriate: allergies, current medications, past family history, past medical history, past social history, past surgical history and problem list.    Past Medical History:   Diagnosis Date   • Asthma    • Cancer (CMS/HCC)     hairy cell leukemia   • Coronary artery disease     minimal with some minimal narrowing of 1 vessel   • ED (erectile dysfunction) of organic origin    • H/O vitamin D deficiency    • Hypertension    • Night sweats    • Pneumonia    • Thrombocytopenia  "(CMS/[facility])      reports that he has never smoked. He has never used smokeless tobacco. He reports that he drinks about 2.0 standard drinks of alcohol per week. He reports that he does not use drugs.   Family History   Problem Relation Age of Onset   • Diabetes Other    • Hypertension Other    • Colon cancer Mother        Review of Systems  Constitutional: No wt loss, fever, fatigue  Gastrointestinal: No nausea, abdominal pain  Behavioral/Psych: No insomnia or anxiety   Cardiovascular no chest pains or tightness in the chest  Objective:       Physical Exam  /75 (BP Location: Left arm, Patient Position: Sitting)   Pulse 51   Ht 161.3 cm (63.5\")   Wt 68.5 kg (151 lb)   BMI 26.33 kg/m²   General appearance: No acute changes   Neck: Trachea midline; NECK, supple, no thyromegaly or lymphadenopathy   Lungs: Normal size and shape, normal breath sounds, equal distribution of air, no rales and rhonchi   CV: S1-S2 regular, no murmurs, no rub, no gallop   Abdomen: Soft, non-tender; no masses , no abnormal abdominal sounds   Extremities: No deformity , normal color , no peripheral edema   Skin: Normal temperature, turgor and texture; no rash, ulcers          Procedures      Echocardiogram:        Current Outpatient Medications:   •  albuterol sulfate  (90 Base) MCG/ACT inhaler, Inhale 2 puffs Every 4 (Four) Hours As Needed for Wheezing., Disp: 1 inhaler, Rfl: 3  •  buprenorphine-naloxone (SUBOXONE) 8-2 MG per SL tablet, TAKE 2 TABLETS UNDER TONGUE EVERY MORNING CS EXPRESS SC HL, Disp: , Rfl: 0  •  senna (SENOKOT) 8.6 MG tablet, Take 1 tablet by mouth As Needed for Constipation., Disp: , Rfl:   •  sildenafil (REVATIO) 20 MG tablet, USE 1 SUGAR AS DIRECTED AS NEEDED, Disp: 10 tablet, Rfl: 5  •  vardenafil (LEVITRA) 20 MG tablet, TAKE 1 TABLET BY MOUTH AS NEEDED FOR ERECTILE DYSFUNCTION, Disp: 20 tablet, Rfl: 3   Assessment:        Patient Active Problem List   Diagnosis   • Thrombocytopenia (CMS/[facility])   • Hairy " cell leukemia (CMS/[facility])   • Allergic rhinitis   • Encounter for monitoring Suboxone maintenance therapy   • Erectile dysfunction   • RAD (reactive airway disease) with wheezing   • Single episode of elevated blood pressure   • Vitamin D deficiency   • Acute respiratory failure with hypoxia (CMS/HCC)   • Hypogammaglobulinemia (CMS/[facility])               Plan:            ICD-10-CM ICD-9-CM   1. Coronary artery disease involving native coronary artery of native heart without angina pectoris I25.10 414.01     1. Coronary artery disease involving native coronary artery of native heart without angina pectoris  Coronary artery disease has been stable ETT is normal which medically       ETT OK SEE IN 1 YR  COUNSELING:    Solo Busch was given to patient for the following topics: diagnostic results, risk factor reductions, impressions, risks and benefits of treatment options and importance of treatment compliance .       SMOKING COUNSELING:    [unfilled]    Dictated using Dragon dictation

## 2020-03-18 ENCOUNTER — TELEPHONE (OUTPATIENT)
Dept: ONCOLOGY | Facility: CLINIC | Age: 60
End: 2020-03-18

## 2020-03-18 NOTE — TELEPHONE ENCOUNTER
Davy Morrow MD       Pt called wanting to reschdule her appointment on: 03/20  Due to: COVID-19  Please call pt back at: [phone]    Thanks

## 2020-03-20 ENCOUNTER — APPOINTMENT (OUTPATIENT)
Dept: LAB | Facility: HOSPITAL | Age: 60
End: 2020-03-20

## 2020-05-20 NOTE — PROGRESS NOTES
Subjective Patient doing well physically.    REASONS FOR FOLLOWUP:    1. Presentation with thrombocytopenia, mild leukopenia and splenomegaly.   2. Diagnosis hairy cell leukemia.   3. Status post hospitalization 02/06/2012-02/15/2012 per treatment with cladribine (2CdA x 7 days continuous IV infusion).   4. Evidence of CR noted 05/09/2012 with interval resolution of splenomegaly, notable on physical exam and peripheral blood smear.   5. Patien t with continued CR noted 09/05/2012, every 3 month assessment to 1 year anniversary planned, every 4 months 2nd year, every 6 months years 3-5.   6. Patient seen 03/05/2013 stable, with plans to move to every 4 month assessment. Patient proceeding through t reatment with Suboxone.   7. The patient was seen on 10/25/2013, stable hematologically and reassessment q.6 months planned.   8. The patient was seen 04/10/2014, status post recent apparent viral illness with normalization of peripheral blood counts and symptoms.   9. Patient seen 09/26/2014, stable - continued remission noted.   10. Patient seen on 03/17/2015, stable in continued remission, every 6 month assessment planned.   11. Patient seen every 6 months to yearly without evidence recurrence-reviewed December 2018  12. Hospitalization December 19-December 24-acute respiratory failure with hypoxia, metapneumovirus  13. Patient assessed May 27, 2020, no additional respiratory issues.  Plans made to reassess immunoglobulin levels and supplement as needed                         History of Present Illness     The patient is a 59-year-old male who has been previously healthy. He had some routine blood work performed recently with Dr. Sinclair on 01/17/2012 showing significant thrombocytopenia with a platelet count of 79,000. His white count was low normal at 4700 with decreased granulocytes at 31% with lymphocytes elevated at 55%. His serum chemistries at the time were unremarkable including normal liver function  tests. Mr. Morales had been feeling reasonably w ell although he reported some fatigue. He has had no fevers, chills, night sweats or unexplained weight loss. He has been on a weight loss program and following a low carb diet and has intentionally lost about 40 pounds in the last year or two. He had noted some easier bruising but had been taking aspirin and fish oil even prior to noticing his platelet count was low.   On his examination in the office 01/31/2012 the patient had significant splenomegaly with spleen tip palpable approximately 7-8 cm below the left costal margin. The spleen is not particularly tender and did not extend to midline. Peripheral smear showed large platelets and atypical lymphocytes with no blasts. Mr. Mroales was assessed by flow cytometry peripheral blood showing androgeni c profile consistent with hairy cell leukemia. Bone marrow aspirate and biopsy also was confirmatory for hairy cell leukemia with normal chromosomal complement. 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 Additionally CT abdomen and pelvis demonstrated splenomegaly with spleen measuring 22 cm in craniocaudal extent up to 16.7 x 8 in the axial plane. No other abnormalities were present. After discussion the patient was advised per linda osborn with 2CdA. He was admitted 02/06/2015-02/15/2012 with a dual lumen PICC line placed. 2CdA began after initial hydration and continued over the subsequent 7 days by continuous IV infusion. He did relatively well except for weakness and fatigue. He ha d additional issues with constipation and exacerbation of chronic low back pain. He further had development of neutropenia 02/12/2012 and was placed on Diflucan, Valtrex and Levaquin. These he also tolerated well. He also required transfusion just prior to discharge. Finally the patient was asked to return 02/16/2012 for Neulasta which he did return back to take in the  office. He subsequently has done relatively well as an outpatient with white count increasing to 13,000 by 02/20/2012 and peaking at 20 , 000 by 02/24/2012. Platelet count also improved substantially peaking 02/23/2012 at 177,000. He has otherwise returned and we were able to remove his PICC line by 02/24/2012 and on 02/27/2012 continued resolution of his leukopenia, antiviral and antibac t erial medication discontinued. The patient now returns today feeling weak in the morning but otherwise doing well for the rest of the day without any fevers, chills, and resolution of night sweats. Appetite, weight and performance status have remained o verall excellent. He has an episode of gouty arthropathy involving his left great toe. He used Indocin briefly and then went back to allopurinol but is now having his gout return this morning.   The patient thereafter did use Indocin successful and thereafter restarted allopurinol. His gouty arthropathy responded appropriately and has not returned. Followup counts were requested and as he returns today Mr. Morales is feeling well. He has returne d to exercise and nearly to his full-time job. He still notes some degree of fatigue in the a.m. and by the early afternoon approximately 12:30-1:00 p.m. He is at work full-time. He has had no fever, chills, weight loss, night sweats, nausea or vomiting, c hange in bowel habit.   The patient was asked to return back for followup reassessment. CT scan 05/02/2012 per splenomegaly showed spleen to have regressed substantially in size. The AP in 1st dimension previously 16.7 x 8 is now 12.2 by 6.1. No other abno rmalities were present. Peripheral smear also shows no evidence of recurrent disease. The patient feels well and has returned to normal activity.   The patient was asked to return for followup now seen 09/05/2012 doing well with normal activity again with considerable improvement in his general performance status.   The patient is now  seen 03/05/2013 continuing to do well. It came to the attention however through a [facility] report that he is current on Suboxone through Dr. Kalia Davidson. In discussion with the patient he evidently had difficulty in trying to discontinue pain medications in the spring of 2012. He eventually saw Dr. Davidson who was hoping to wean him altogether from pain medications with the use of Suboxone. 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 He does this primarily through diet at present.   The patient thereafter was asked to return in followup. He is now seen on 10/25/2013 feeling well overall, continuing with exercise and dietary program. He feels well overall except for mild headache, approximately at 3:00-4:00 p.m. each day(?).   The patient thereafter was asked to be seen back 6 months later . He is now seen 09/26/2014 doing well continuing his exercise and dietary program to terrific effect. He has had no issues since last seen and in fact has normalized his hypertension as a result of his dietary program.   The patient thereafter was asked to be seen 6 months later. He continues to do his exercise and dietary program to wonderful effect. He has had no additional issues since last seen.   The patient is now seen back 6 months later, 10/13/2015, and fortunately does continue to do amazingly well. He has maintained his exercise and dietary program, again to excellent and continued significant effect.   Patient now reviewed August 04, 2016.  He continues to feel well except for periodic fatigue.    The patient is reviewed December 04, 2017.  Hematologically he is stable and remains in remission.  Plans are to eventually discontinue Suboxone as well.    Patient is next seen December 13, 2018.  He continues to exercise regularly and feels generally good though has been concerned about  possibly splenic discomfort.  He also describes that his mother is been diagnosed with neuroendocrine carcinoma involving the bowel and is undergoing treatment up to and including immunotherapy.    The patient, unfortunately, required hospitalization December 19 through December 24 having been admitted with pneumonia and bilateral infiltrates associate with acute respiratory failure.  His studies went on to reveal evidence of metapneumovirus infection and, again, he is felt to have pneumonia with immunocompromise state, treated with broad-spectrum antibiotics and ICU management.  This included IV steroids and additional assessment included an IgG reduced at 497, IgA 104, IgM 31 and IgA 58.  As result he was given IV immunoglobulin at 400 mg/kg.   Fortunately he went on to slowly and steadily improve able to be switched to oral antibiotic therapies, tapering steroids and was able to be discharged home.  Chest x-ray December 23 prior to discharge included lungs now that were expanded with marked improvement in previous extensive bilateral infiltrates, cardiomegaly also noted.  Patient is next seen January 17, 2020 markedly improved and nearly back to normal activity.  We discussed immunoglobulin reassessment and possibly prophylaxis.  The patient is next seen May 27, 2020 and continues to feel well.  He has had no additional infectious complications and peripheral smear is normal when seen in office today.  At the time of this dictation his immunoglobulins have returned as consistent with IgA of 85, IgG of 614 and IgM of 26.  At this point additional supplementation is not necessary but may become important as we move into the fall.  Past Medical History:   Diagnosis Date   • Asthma    • Cancer (CMS/HCC)     hairy cell leukemia   • Coronary artery disease     minimal with some minimal narrowing of 1 vessel   • ED (erectile dysfunction) of organic origin    • H/O vitamin D deficiency    • Hypertension    • Night sweats     • Pneumonia    • Thrombocytopenia (CMS/HCC)        ONCOLOGIC HISTORY:  (History from previous dates can be found in the separate document.)    Current Outpatient Medications on File Prior to Visit   Medication Sig Dispense Refill   • albuterol sulfate  (90 Base) MCG/ACT inhaler Inhale 2 puffs Every 4 (Four) Hours As Needed for Wheezing. 1 inhaler 3   • buprenorphine-naloxone (SUBOXONE) 8-2 MG per SL tablet TAKE 2 TABLETS UNDER TONGUE EVERY MORNING CS [facility] SC HL  0   • senna (SENOKOT) 8.6 MG tablet Take 1 tablet by mouth As Needed for Constipation.     • sildenafil (REVATIO) 20 MG tablet USE 1 SUGAR AS DIRECTED AS NEEDED 10 tablet 5   • vardenafil (LEVITRA) 20 MG tablet TAKE 1 TABLET BY MOUTH AS NEEDED FOR ERECTILE DYSFUNCTION 20 tablet 3     No current facility-administered medications on file prior to visit.        ALLERGIES:     Allergies   Allergen Reactions   • Ciprofloxacin Rash       Social History     Socioeconomic History   • Marital status:      Spouse name: Radha   • Number of children: Not on file   • Years of education: Not on file   • Highest education level: Not on file   Occupational History   • Occupation:      Employer: [employer]   Tobacco Use   • Smoking status: Never Smoker   • Smokeless tobacco: Never Used   Substance and Sexual Activity   • Alcohol use: Yes     Alcohol/week: 2.0 standard drinks     Types: 2 Cans of beer per week     Comment: social   • Drug use: No   • Sexual activity: Defer         Cancer-related family history includes Colon cancer in his mother.     Review of Systems   Constitutional: Negative for fatigue.   Respiratory: Negative for chest tightness and shortness of breath.    Gastrointestinal: Negative for constipation, diarrhea, nausea and vomiting.   Neurological: Negative for weakness.     A comprehensive 14 point review of systems was performed and was negative except as mentioned.    Objective      Vitals:    05/27/20  "1551   BP: 156/83   Pulse: 62   Resp: 18   Temp: 98.1 °F (36.7 °C)   TempSrc: Oral   SpO2: 97%   Weight: 69.3 kg (152 lb 11.2 oz)   Height: 161.3 cm (63.5\")   PainSc: 0-No pain     Current Status 5/27/2020   ECOG score 0       Physical Exam    GENERAL: Well-developed, well-nourished  male in no acute distress.   SKIN: Warm, dry without rashes, purpura or petechiae.   HEAD: Normocephalic.   EYES: Pupils equal, round and reactive to light. EOMs intact. Conjunctivae normal.   EARS: Hearing intact.   NOSE: Septum midline. No excoriations or nasal discharge.   MOUTH: Tongue is well-papillated; no stomatitis or ulcers. Lips normal.   THROAT: Oropharynx without lesions or exudates.   NECK: Supple with good range of motion; no thyromegaly or masses, no JVD or bruits.   LYMPHATICS: No cervical, supraclavicular, axillary or inguinal adenopathy.   CHEST: Lungs clear to percussion and auscultation.   CARDIAC: Regular rate and rhythm without murmurs, rubs or gallops.   ABDOMEN: Soft, nontender with no organomegaly or masses.   EXTREMITIES: No clubbing, cyanosis or edema.   NEUROLOGICAL: No focal neurological deficits.    RECENT LABS:  Hematology WBC   Date Value Ref Range Status   05/27/2020 5.33 3.40 - 10.80 10*3/mm3 Final   02/20/2020 3.85 3.40 - 10.80 10*3/mm3 Final     RBC   Date Value Ref Range Status   05/27/2020 4.82 4.14 - 5.80 10*6/mm3 Final   02/20/2020 4.55 4.14 - 5.80 10*6/mm3 Final     Hemoglobin   Date Value Ref Range Status   05/27/2020 13.9 13.0 - 17.7 g/dL Final     Hematocrit   Date Value Ref Range Status   05/27/2020 41.4 37.5 - 51.0 % Final     Platelets   Date Value Ref Range Status   05/27/2020 162 140 - 450 10*3/mm3 Final      XR CHEST 1 VW 12/21/19    FINDINGS: Heart size is borderline. Extensive bilateral infiltrates  appear similar to prior exam. No pleural effusion, or pneumothorax. No  acute osseous process.     IMPRESSION:  No significant change, continued follow-up recommended.     This " report was finalized on 12/21/2019 7:59 AM by Dr. Jhony Loco M.D.    Assessment/Plan       A 59-year-old male with a history of hairy cell leukemia. He presented with leukopenia, thrombocytopenia, and splenomegaly. After diagnosis was confirmed he was treated with cladribine 02/06/2012-02/15/2012 by continuous IV infusion. ALLYSON puentes developed cytopenias, but again not to a serious degree and ultimately exam in late February 2012 was negative for evidence of residual and flow cytometric assessment. Subsequent physical examination showed resolution of splenomegaly and normalization o f performance status. Followup CT also normalized as well per splenomegaly. The patient has been followed since and when seen in March 2013 and October 2013, was felt to be in CR. He had been seen just after recent viral illness and though he was feeling poorly, symptoms went on to improve and returned to baseline status. At 6 month review, the patient continues in remission. This further vindicates and since last visit, he has had no additional urinary symptoms either.   At this point we find no evidence of recurrent disease and again feel that Mr. Morales remains in complete remission. This has been the case in 03/17/2015 and again is notable 10/13/2015 as well as April 2016, August 2016 and December 2017 .  The patient was last seen December 13, 2018 doing well without evidence recurrence .       The patient, unfortunately, required hospitalization December 19 through December 24 having been admitted with pneumonia and bilateral infiltrates associate with acute respiratory failure.  His studies went on to reveal evidence of metapneumovirus infection and, again, he is felt to have pneumonia with immunocompromise state, treated with broad-spectrum antibiotics and ICU management.  This included IV steroids and additional assessment included an IgG reduced at 497, IgA 104, IgM 31 and IgA 58.  As result he was given IV immunoglobulin at  400 mg/kg.   Fortunately he went on to slowly and steadily improve able to be switched to oral antibiotic therapies, tapering steroids and was able to be discharged home.  Chest x-ray December 23 prior to discharge included lungs now that were expanded with marked improvement in previous extensive bilateral infiltrates, cardiomegaly also noted.  As he and his wife are seen back January 7, 2020 we have discussed that he should be reassessed per hemoglobin levels including a possible subclass deficiency and be considered for potential prophylaxis with IVIG.  This is not absolute and that he has done well for many years without requiring such infusions but does bear further assessment.     The patient is next seen May 27, 2020 with immunoglobulins remaining relatively stable at above 600 drawn May 18, 2020 for IVIG, at which time subclasses were also acceptable, recheck May 27 with IgG of 614.  At this point supplementation is not necessary that we may review this as the fall approaches.  The patient agreeable to this plan.    Plan:    *Return for assessment in 11 weeks, immunoglobulin levels, CMP and CBC    *12 weeks MD

## 2020-05-21 DIAGNOSIS — D80.1 HYPOGAMMAGLOBULINEMIA (HCC): Primary | ICD-10-CM

## 2020-05-27 ENCOUNTER — LAB (OUTPATIENT)
Dept: LAB | Facility: HOSPITAL | Age: 60
End: 2020-05-27

## 2020-05-27 ENCOUNTER — OFFICE VISIT (OUTPATIENT)
Dept: ONCOLOGY | Facility: CLINIC | Age: 60
End: 2020-05-27

## 2020-05-27 VITALS
OXYGEN SATURATION: 97 % | BODY MASS INDEX: 26.07 KG/M2 | HEART RATE: 62 BPM | WEIGHT: 152.7 LBS | TEMPERATURE: 98.1 F | SYSTOLIC BLOOD PRESSURE: 156 MMHG | HEIGHT: 64 IN | RESPIRATION RATE: 18 BRPM | DIASTOLIC BLOOD PRESSURE: 83 MMHG

## 2020-05-27 DIAGNOSIS — C91.41 HAIRY CELL LEUKEMIA, IN REMISSION (HCC): ICD-10-CM

## 2020-05-27 DIAGNOSIS — D80.1 HYPOGAMMAGLOBULINEMIA (HCC): Primary | ICD-10-CM

## 2020-05-27 DIAGNOSIS — D80.1 HYPOGAMMAGLOBULINEMIA (HCC): ICD-10-CM

## 2020-05-27 LAB
BASOPHILS # BLD AUTO: 0.02 10*3/MM3 (ref 0–0.2)
BASOPHILS NFR BLD AUTO: 0.4 % (ref 0–1.5)
DEPRECATED RDW RBC AUTO: 40.3 FL (ref 37–54)
EOSINOPHIL # BLD AUTO: 0.08 10*3/MM3 (ref 0–0.4)
EOSINOPHIL NFR BLD AUTO: 1.5 % (ref 0.3–6.2)
ERYTHROCYTE [DISTWIDTH] IN BLOOD BY AUTOMATED COUNT: 12.9 % (ref 12.3–15.4)
HCT VFR BLD AUTO: 41.4 % (ref 37.5–51)
HGB BLD-MCNC: 13.9 G/DL (ref 13–17.7)
IGA1 MFR SER: 85 MG/DL (ref 70–400)
IGG1 SER-MCNC: 614 MG/DL (ref 700–1600)
IGM SERPL-MCNC: 26 MG/DL (ref 40–230)
IMM GRANULOCYTES # BLD AUTO: 0.01 10*3/MM3 (ref 0–0.05)
IMM GRANULOCYTES NFR BLD AUTO: 0.2 % (ref 0–0.5)
LYMPHOCYTES # BLD AUTO: 1.1 10*3/MM3 (ref 0.7–3.1)
LYMPHOCYTES NFR BLD AUTO: 20.6 % (ref 19.6–45.3)
MCH RBC QN AUTO: 28.8 PG (ref 26.6–33)
MCHC RBC AUTO-ENTMCNC: 33.6 G/DL (ref 31.5–35.7)
MCV RBC AUTO: 85.9 FL (ref 79–97)
MONOCYTES # BLD AUTO: 0.24 10*3/MM3 (ref 0.1–0.9)
MONOCYTES NFR BLD AUTO: 4.5 % (ref 5–12)
NEUTROPHILS # BLD AUTO: 3.88 10*3/MM3 (ref 1.7–7)
NEUTROPHILS NFR BLD AUTO: 72.8 % (ref 42.7–76)
NRBC BLD AUTO-RTO: 0 /100 WBC (ref 0–0.2)
PLATELET # BLD AUTO: 162 10*3/MM3 (ref 140–450)
PMV BLD AUTO: 10.4 FL (ref 6–12)
RBC # BLD AUTO: 4.82 10*6/MM3 (ref 4.14–5.8)
WBC NRBC COR # BLD: 5.33 10*3/MM3 (ref 3.4–10.8)

## 2020-05-27 PROCEDURE — 82784 ASSAY IGA/IGD/IGG/IGM EACH: CPT | Performed by: INTERNAL MEDICINE

## 2020-05-27 PROCEDURE — 99213 OFFICE O/P EST LOW 20 MIN: CPT | Performed by: INTERNAL MEDICINE

## 2020-05-27 PROCEDURE — 85025 COMPLETE CBC W/AUTO DIFF WBC: CPT

## 2020-05-27 PROCEDURE — 36415 COLL VENOUS BLD VENIPUNCTURE: CPT

## 2020-08-17 ENCOUNTER — LAB (OUTPATIENT)
Dept: LAB | Facility: HOSPITAL | Age: 60
End: 2020-08-17

## 2020-08-17 DIAGNOSIS — C91.41 HAIRY CELL LEUKEMIA, IN REMISSION (HCC): ICD-10-CM

## 2020-08-17 DIAGNOSIS — D80.1 HYPOGAMMAGLOBULINEMIA (HCC): ICD-10-CM

## 2020-08-17 LAB
ALBUMIN SERPL-MCNC: 4.5 G/DL (ref 3.5–5.2)
ALBUMIN/GLOB SERPL: 2 G/DL (ref 1.1–2.4)
ALP SERPL-CCNC: 36 U/L (ref 38–116)
ALT SERPL W P-5'-P-CCNC: 20 U/L (ref 0–41)
ANION GAP SERPL CALCULATED.3IONS-SCNC: 13.4 MMOL/L (ref 5–15)
AST SERPL-CCNC: 24 U/L (ref 0–40)
BASOPHILS # BLD AUTO: 0.01 10*3/MM3 (ref 0–0.2)
BASOPHILS NFR BLD AUTO: 0.2 % (ref 0–1.5)
BILIRUB SERPL-MCNC: 0.4 MG/DL (ref 0.2–1.2)
BUN SERPL-MCNC: 22 MG/DL (ref 6–20)
BUN/CREAT SERPL: 20.4 (ref 7.3–30)
CALCIUM SPEC-SCNC: 9.7 MG/DL (ref 8.5–10.2)
CHLORIDE SERPL-SCNC: 102 MMOL/L (ref 98–107)
CO2 SERPL-SCNC: 23.6 MMOL/L (ref 22–29)
CREAT SERPL-MCNC: 1.08 MG/DL (ref 0.7–1.3)
DEPRECATED RDW RBC AUTO: 40 FL (ref 37–54)
EOSINOPHIL # BLD AUTO: 0.04 10*3/MM3 (ref 0–0.4)
EOSINOPHIL NFR BLD AUTO: 0.9 % (ref 0.3–6.2)
ERYTHROCYTE [DISTWIDTH] IN BLOOD BY AUTOMATED COUNT: 12.5 % (ref 12.3–15.4)
GFR SERPL CREATININE-BSD FRML MDRD: 70 ML/MIN/1.73
GLOBULIN UR ELPH-MCNC: 2.2 GM/DL (ref 1.8–3.5)
GLUCOSE SERPL-MCNC: 109 MG/DL (ref 74–124)
HCT VFR BLD AUTO: 40.6 % (ref 37.5–51)
HGB BLD-MCNC: 13.9 G/DL (ref 13–17.7)
IMM GRANULOCYTES # BLD AUTO: 0.01 10*3/MM3 (ref 0–0.05)
IMM GRANULOCYTES NFR BLD AUTO: 0.2 % (ref 0–0.5)
LYMPHOCYTES # BLD AUTO: 0.79 10*3/MM3 (ref 0.7–3.1)
LYMPHOCYTES NFR BLD AUTO: 17.7 % (ref 19.6–45.3)
MCH RBC QN AUTO: 30 PG (ref 26.6–33)
MCHC RBC AUTO-ENTMCNC: 34.2 G/DL (ref 31.5–35.7)
MCV RBC AUTO: 87.5 FL (ref 79–97)
MONOCYTES # BLD AUTO: 0.17 10*3/MM3 (ref 0.1–0.9)
MONOCYTES NFR BLD AUTO: 3.8 % (ref 5–12)
NEUTROPHILS NFR BLD AUTO: 3.45 10*3/MM3 (ref 1.7–7)
NEUTROPHILS NFR BLD AUTO: 77.2 % (ref 42.7–76)
NRBC BLD AUTO-RTO: 0 /100 WBC (ref 0–0.2)
PLATELET # BLD AUTO: 139 10*3/MM3 (ref 140–450)
PMV BLD AUTO: 10.7 FL (ref 6–12)
POTASSIUM SERPL-SCNC: 4.2 MMOL/L (ref 3.5–4.7)
PROT SERPL-MCNC: 6.7 G/DL (ref 6.3–8)
RBC # BLD AUTO: 4.64 10*6/MM3 (ref 4.14–5.8)
SODIUM SERPL-SCNC: 139 MMOL/L (ref 134–145)
WBC # BLD AUTO: 4.47 10*3/MM3 (ref 3.4–10.8)

## 2020-08-17 PROCEDURE — 80053 COMPREHEN METABOLIC PANEL: CPT

## 2020-08-17 PROCEDURE — 85025 COMPLETE CBC W/AUTO DIFF WBC: CPT

## 2020-08-17 PROCEDURE — 36415 COLL VENOUS BLD VENIPUNCTURE: CPT

## 2020-08-18 LAB
ALBUMIN SERPL-MCNC: 4.1 G/DL (ref 2.9–4.4)
ALBUMIN/GLOB SERPL: 1.8 {RATIO} (ref 0.7–1.7)
ALPHA1 GLOB FLD ELPH-MCNC: 0.2 G/DL (ref 0–0.4)
ALPHA2 GLOB SERPL ELPH-MCNC: 0.7 G/DL (ref 0.4–1)
B-GLOBULIN SERPL ELPH-MCNC: 1 G/DL (ref 0.7–1.3)
GAMMA GLOB SERPL ELPH-MCNC: 0.5 G/DL (ref 0.4–1.8)
GLOBULIN SER CALC-MCNC: 2.3 G/DL (ref 2.2–3.9)
IGA SERPL-MCNC: 90 MG/DL (ref 61–437)
IGG SERPL-MCNC: 590 MG/DL (ref 603–1613)
IGM SERPL-MCNC: 27 MG/DL (ref 20–172)
INTERPRETATION SERPL IEP-IMP: ABNORMAL
KAPPA LC SERPL-MCNC: 10.4 MG/L (ref 3.3–19.4)
KAPPA LC/LAMBDA SER: 0.97 {RATIO} (ref 0.26–1.65)
LAMBDA LC FREE SERPL-MCNC: 10.7 MG/L (ref 5.7–26.3)
Lab: ABNORMAL
M-SPIKE: ABNORMAL G/DL
PROT SERPL-MCNC: 6.4 G/DL (ref 6–8.5)

## 2020-08-20 ENCOUNTER — OFFICE VISIT (OUTPATIENT)
Dept: INTERNAL MEDICINE | Facility: CLINIC | Age: 60
End: 2020-08-20

## 2020-08-20 VITALS
SYSTOLIC BLOOD PRESSURE: 142 MMHG | OXYGEN SATURATION: 99 % | TEMPERATURE: 97.7 F | WEIGHT: 150 LBS | HEART RATE: 51 BPM | DIASTOLIC BLOOD PRESSURE: 80 MMHG | HEIGHT: 64 IN | BODY MASS INDEX: 25.61 KG/M2

## 2020-08-20 DIAGNOSIS — N52.01 ERECTILE DYSFUNCTION DUE TO ARTERIAL INSUFFICIENCY: ICD-10-CM

## 2020-08-20 DIAGNOSIS — C91.41 HAIRY CELL LEUKEMIA, IN REMISSION (HCC): ICD-10-CM

## 2020-08-20 DIAGNOSIS — E55.9 VITAMIN D DEFICIENCY: Primary | Chronic | ICD-10-CM

## 2020-08-20 DIAGNOSIS — D80.1 HYPOGAMMAGLOBULINEMIA (HCC): ICD-10-CM

## 2020-08-20 DIAGNOSIS — D69.6 THROMBOCYTOPENIA (HCC): ICD-10-CM

## 2020-08-20 PROCEDURE — 99214 OFFICE O/P EST MOD 30 MIN: CPT | Performed by: INTERNAL MEDICINE

## 2020-08-20 NOTE — PROGRESS NOTES
"Subjective   Solo Morales is a 60 y.o. male. Presents with a chief complaint of thrombocytopenia, s/p HCL treatment (currently in remission), ED, on Suboxone treatment, hypogammaglobulinemia, vitamin D deficiency here for follow up and evaluation. He is doing well overall without any new complaints. He exercises daily with excellent results.      /80   Pulse 51   Temp 97.7 °F (36.5 °C)   Ht 161.3 cm (63.5\")   Wt 68 kg (150 lb)   SpO2 99%   BMI 26.15 kg/m²     Body mass index is 26.15 kg/m².    History of Present Illness doing well overall, no new complaints    The following portions of the patient's history were reviewed and updated as appropriate: allergies, current medications, past family history, past medical history, past social history, past surgical history and problem list.    Review of Systems   Constitutional: Negative.    HENT: Negative.    Respiratory: Negative.    Cardiovascular: Negative.    Gastrointestinal: Negative.    Genitourinary: Positive for erectile dysfunction.   Musculoskeletal: Negative.    Neurological: Negative.    Psychiatric/Behavioral: Negative.        Objective   Physical Exam   Constitutional: He is oriented to person, place, and time. He appears well-developed and well-nourished.   HENT:   Head: Normocephalic and atraumatic.   Cardiovascular: Normal rate, regular rhythm and normal heart sounds.   Pulmonary/Chest: Effort normal and breath sounds normal.   Abdominal: Soft. Bowel sounds are normal.   Musculoskeletal: Normal range of motion.   Neurological: He is alert and oriented to person, place, and time.   Psychiatric: He has a normal mood and affect.   Nursing note and vitals reviewed.        Assessment/Plan   Solo was seen today for follow-up and erectile dysfunction.    Diagnoses and all orders for this visit:    Vitamin D deficiency  Comments:  continue vitamin D daily    Hypogammaglobulinemia (CMS/[facility])  Comments:  gamma globulin infusion next " week    Thrombocytopenia (CMS/HCC)  Comments:  stable for now    Hairy cell leukemia, in remission (CMS/HCC)  Comments:  currently in remission    Erectile dysfunction due to arterial insufficiency  Comments:  levitra prn                 Answers for HPI/ROS submitted by the patient on 8/13/2020   What is the primary reason for your visit?: Physical

## 2020-08-24 ENCOUNTER — OFFICE VISIT (OUTPATIENT)
Dept: ONCOLOGY | Facility: CLINIC | Age: 60
End: 2020-08-24

## 2020-08-24 ENCOUNTER — APPOINTMENT (OUTPATIENT)
Dept: ONCOLOGY | Facility: HOSPITAL | Age: 60
End: 2020-08-24

## 2020-08-24 ENCOUNTER — APPOINTMENT (OUTPATIENT)
Dept: LAB | Facility: HOSPITAL | Age: 60
End: 2020-08-24

## 2020-08-24 DIAGNOSIS — D80.1 HYPOGAMMAGLOBULINEMIA (HCC): ICD-10-CM

## 2020-08-24 DIAGNOSIS — C91.41 HAIRY CELL LEUKEMIA, IN REMISSION (HCC): Primary | ICD-10-CM

## 2020-08-24 PROCEDURE — 99442 PR PHYS/QHP TELEPHONE EVALUATION 11-20 MIN: CPT | Performed by: INTERNAL MEDICINE

## 2020-08-24 NOTE — PROGRESS NOTES
Subjective Patient doing well physically.    REASONS FOR FOLLOWUP:    1. Presentation with thrombocytopenia, mild leukopenia and splenomegaly.   2. Diagnosis hairy cell leukemia.   3. Status post hospitalization 02/06/2012-02/15/2012 per treatment with cladribine (2CdA x 7 days continuous IV infusion).   4. Evidence of CR noted 05/09/2012 with interval resolution of splenomegaly, notable on physical exam and peripheral blood smear.   5. Patien t with continued CR noted 09/05/2012, every 3 month assessment to 1 year anniversary planned, every 4 months 2nd year, every 6 months years 3-5.   6. Patient seen 03/05/2013 stable, with plans to move to every 4 month assessment. Patient proceeding through t reatment with Suboxone.   7. The patient was seen on 10/25/2013, stable hematologically and reassessment q.6 months planned.   8. The patient was seen 04/10/2014, status post recent apparent viral illness with normalization of peripheral blood counts and symptoms.   9. Patient seen 09/26/2014, stable - continued remission noted.   10. Patient seen on 03/17/2015, stable in continued remission, every 6 month assessment planned.   11. Patient seen every 6 months to yearly without evidence recurrence-reviewed December 2018  12. Hospitalization December 19-December 24-acute respiratory failure with hypoxia, metapneumovirus  13. Patient assessed May 27, 2020, no additional respiratory issues.  Plans made to reassess immunoglobulin levels and supplement as needed  14. Patient reassessed August 2020 with acceptable immunoglobulin levels.                         History of Present Illness     The patient is a 59-year-old male who has been previously healthy. He had some routine blood work performed recently with Dr. Sinclair on 01/17/2012 showing significant thrombocytopenia with a platelet count of 79,000. His white count was low normal at 4700 with decreased granulocytes at 31% with lymphocytes elevated at 55%. His serum  chemistries at the time were unremarkable including normal liver function tests. Mr. Morales had been feeling reasonably w ell although he reported some fatigue. He has had no fevers, chills, night sweats or unexplained weight loss. He has been on a weight loss program and following a low carb diet and has intentionally lost about 40 pounds in the last year or two. He had noted some easier bruising but had been taking aspirin and fish oil even prior to noticing his platelet count was low.   On his examination in the office 01/31/2012 the patient had significant splenomegaly with spleen tip palpable approximately 7-8 cm below the left costal margin. The spleen is not particularly tender and did not extend to midline. Peripheral smear showed large platelets and atypical lymphocytes with no blasts. Mr. Morales was assessed by flow cytometry peripheral blood showing androgeni c profile consistent with hairy cell leukemia. Bone marrow aspirate and biopsy also was confirmatory for hairy cell leukemia with normal chromosomal complement. 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 Additionally CT abdomen and pelvis demonstrated splenomegaly with spleen measuring 22 cm in craniocaudal extent up to 16.7 x 8 in the axial plane. No other abnormalities were present. After discussion the patient was advised per linda osborn with 2CdA. He was admitted 02/06/2015-02/15/2012 with a dual lumen PICC line placed. 2CdA began after initial hydration and continued over the subsequent 7 days by continuous IV infusion. He did relatively well except for weakness and fatigue. He ha d additional issues with constipation and exacerbation of chronic low back pain. He further had development of neutropenia 02/12/2012 and was placed on Diflucan, Valtrex and Levaquin. These he also tolerated well. He also required transfusion just prior to discharge. Finally the patient was asked  to return 02/16/2012 for Neulasta which he did return back to take in the office. He subsequently has done relatively well as an outpatient with white count increasing to 13,000 by 02/20/2012 and peaking at 20 , 000 by 02/24/2012. Platelet count also improved substantially peaking 02/23/2012 at 177,000. He has otherwise returned and we were able to remove his PICC line by 02/24/2012 and on 02/27/2012 continued resolution of his leukopenia, antiviral and antibac t erial medication discontinued. The patient now returns today feeling weak in the morning but otherwise doing well for the rest of the day without any fevers, chills, and resolution of night sweats. Appetite, weight and performance status have remained o verall excellent. He has an episode of gouty arthropathy involving his left great toe. He used Indocin briefly and then went back to allopurinol but is now having his gout return this morning.   The patient thereafter did use Indocin successful and thereafter restarted allopurinol. His gouty arthropathy responded appropriately and has not returned. Followup counts were requested and as he returns today Mr. Morales is feeling well. He has returne d to exercise and nearly to his full-time job. He still notes some degree of fatigue in the a.m. and by the early afternoon approximately 12:30-1:00 p.m. He is at work full-time. He has had no fever, chills, weight loss, night sweats, nausea or vomiting, c hange in bowel habit.   The patient was asked to return back for followup reassessment. CT scan 05/02/2012 per splenomegaly showed spleen to have regressed substantially in size. The AP in 1st dimension previously 16.7 x 8 is now 12.2 by 6.1. No other abno rmalities were present. Peripheral smear also shows no evidence of recurrent disease. The patient feels well and has returned to normal activity.   The patient was asked to return for followup now seen 09/05/2012 doing well with normal activity again with  considerable improvement in his general performance status.   The patient is now seen 03/05/2013 continuing to do well. It came to the attention however through a [facility] report that he is current on Suboxone through Dr. Kalia Davidson. In discussion with the patient he evidently had difficulty in trying to discontinue pain medications in the spring of 2012. He eventually saw Dr. Davidson who was hoping to wean him altogether from pain medications with the use of Suboxone. 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 He does this primarily through diet at present.   The patient thereafter was asked to return in followup. He is now seen on 10/25/2013 feeling well overall, continuing with exercise and dietary program. He feels well overall except for mild headache, approximately at 3:00-4:00 p.m. each day(?).   The patient thereafter was asked to be seen back 6 months later . He is now seen 09/26/2014 doing well continuing his exercise and dietary program to terrific effect. He has had no issues since last seen and in fact has normalized his hypertension as a result of his dietary program.   The patient thereafter was asked to be seen 6 months later. He continues to do his exercise and dietary program to wonderful effect. He has had no additional issues since last seen.   The patient is now seen back 6 months later, 10/13/2015, and fortunately does continue to do amazingly well. He has maintained his exercise and dietary program, again to excellent and continued significant effect.   Patient now reviewed August 04, 2016.  He continues to feel well except for periodic fatigue.    The patient is reviewed December 04, 2017.  Hematologically he is stable and remains in remission.  Plans are to eventually discontinue Suboxone as well.    Patient is next seen December 13, 2018.  He continues  to exercise regularly and feels generally good though has been concerned about possibly splenic discomfort.  He also describes that his mother is been diagnosed with neuroendocrine carcinoma involving the bowel and is undergoing treatment up to and including immunotherapy.    The patient, unfortunately, required hospitalization December 19 through December 24 having been admitted with pneumonia and bilateral infiltrates associate with acute respiratory failure.  His studies went on to reveal evidence of metapneumovirus infection and, again, he is felt to have pneumonia with immunocompromise state, treated with broad-spectrum antibiotics and ICU management.  This included IV steroids and additional assessment included an IgG reduced at 497, IgA 104, IgM 31 and IgA 58.  As result he was given IV immunoglobulin at 400 mg/kg.   Fortunately he went on to slowly and steadily improve able to be switched to oral antibiotic therapies, tapering steroids and was able to be discharged home.  Chest x-ray December 23 prior to discharge included lungs now that were expanded with marked improvement in previous extensive bilateral infiltrates, cardiomegaly also noted.  Patient is next seen January 17, 2020 markedly improved and nearly back to normal activity.  We discussed immunoglobulin reassessment and possibly prophylaxis.  The patient is next seen May 27, 2020 and continues to feel well.  He has had no additional infectious complications and peripheral smear is normal when seen in office today.  At the time of this dictation his immunoglobulins have returned as consistent with IgA of 85, IgG of 614 and IgM of 26.  At this point additional supplementation is not necessary but may become important as we move into the fall.    We had the patient return for follow-up assessing him for immunoglobulin levels August 17 with IgG of 590, IgA of 90, IgM 27, no monoclonality.  IV immunoglobulin was nonapproved as result.    He is now  contacted by telephone and he is feeling well. We discussed his current immunnoglobulin levels that do not warrant prophylactic IVIG at this point.  We would want to assess this in November just prior to a planned 2-month stay in [place].  Past Medical History:   Diagnosis Date   • Asthma    • Cancer (CMS/HCC)     hairy cell leukemia   • Coronary artery disease     minimal with some minimal narrowing of 1 vessel   • ED (erectile dysfunction) of organic origin    • H/O vitamin D deficiency    • Hypertension    • Night sweats    • Pneumonia    • Thrombocytopenia (CMS/HCC)        ONCOLOGIC HISTORY:  (History from previous dates can be found in the separate document.)    Current Outpatient Medications on File Prior to Visit   Medication Sig Dispense Refill   • albuterol sulfate  (90 Base) MCG/ACT inhaler Inhale 2 puffs Every 4 (Four) Hours As Needed for Wheezing. 1 inhaler 3   • buprenorphine-naloxone (SUBOXONE) 8-2 MG per SL tablet TAKE 2 TABLETS UNDER TONGUE EVERY MORNING CS EXPRESS SC HL  0   • senna (SENOKOT) 8.6 MG tablet Take 1 tablet by mouth As Needed for Constipation.     • sildenafil (REVATIO) 20 MG tablet USE 1 SUGAR AS DIRECTED AS NEEDED 10 tablet 5   • vardenafil (LEVITRA) 20 MG tablet TAKE 1 TABLET BY MOUTH AS NEEDED FOR ERECTILE DYSFUNCTION 20 tablet 3     No current facility-administered medications on file prior to visit.        ALLERGIES:     Allergies   Allergen Reactions   • Ciprofloxacin Rash       Social History     Socioeconomic History   • Marital status:      Spouse name: Radha   • Number of children: Not on file   • Years of education: Not on file   • Highest education level: Not on file   Occupational History   • Occupation:      Employer: [employer]   Tobacco Use   • Smoking status: Never Smoker   • Smokeless tobacco: Never Used   Substance and Sexual Activity   • Alcohol use: Yes     Alcohol/week: 2.0 standard drinks     Types: 2 Cans of beer per  week     Comment: social   • Drug use: No   • Sexual activity: Defer         Cancer-related family history includes Colon cancer in his mother.     Review of Systems   Constitutional: Negative for fatigue.   Respiratory: Negative for chest tightness and shortness of breath.    Gastrointestinal: Negative for constipation, diarrhea, nausea and vomiting.   Neurological: Negative for weakness.     A comprehensive 14 point review of systems was performed and was negative except as mentioned.    Objective      There were no vitals filed for this visit.  Current Status 5/27/2020   ECOG score 0   Previous exam January 2020    Physical Exam    GENERAL: Well-developed, well-nourished  male in no acute distress.   SKIN: Warm, dry without rashes, purpura or petechiae.   HEAD: Normocephalic.   EYES: Pupils equal, round and reactive to light. EOMs intact. Conjunctivae normal.   EARS: Hearing intact.   NOSE: Septum midline. No excoriations or nasal discharge.   MOUTH: Tongue is well-papillated; no stomatitis or ulcers. Lips normal.   THROAT: Oropharynx without lesions or exudates.   NECK: Supple with good range of motion; no thyromegaly or masses, no JVD or bruits.   LYMPHATICS: No cervical, supraclavicular, axillary or inguinal adenopathy.   CHEST: Lungs clear to percussion and auscultation.   CARDIAC: Regular rate and rhythm without murmurs, rubs or gallops.   ABDOMEN: Soft, nontender with no organomegaly or masses.   EXTREMITIES: No clubbing, cyanosis or edema.   NEUROLOGICAL: No focal neurological deficits.    RECENT LABS:  Hematology WBC   Date Value Ref Range Status   08/17/2020 4.47 3.40 - 10.80 10*3/mm3 Final   02/20/2020 3.85 3.40 - 10.80 10*3/mm3 Final     RBC   Date Value Ref Range Status   08/17/2020 4.64 4.14 - 5.80 10*6/mm3 Final   02/20/2020 4.55 4.14 - 5.80 10*6/mm3 Final     Hemoglobin   Date Value Ref Range Status   08/17/2020 13.9 13.0 - 17.7 g/dL Final     Hematocrit   Date Value Ref Range Status    08/17/2020 40.6 37.5 - 51.0 % Final     Platelets   Date Value Ref Range Status   08/17/2020 139 (L) 140 - 450 10*3/mm3 Final      XR CHEST 1 VW 12/21/19    FINDINGS: Heart size is borderline. Extensive bilateral infiltrates  appear similar to prior exam. No pleural effusion, or pneumothorax. No  acute osseous process.     IMPRESSION:  No significant change, continued follow-up recommended.     This report was finalized on 12/21/2019 7:59 AM by Dr. Jhony Loco M.D.    Assessment/Plan       A 60-year-old male with a history of hairy cell leukemia. He presented with leukopenia, thrombocytopenia, and splenomegaly. After diagnosis was confirmed he was treated with cladribine 02/06/2012-02/15/2012 by continuous IV infusion. ALLYSON puentes developed cytopenias, but again not to a serious degree and ultimately exam in late February 2012 was negative for evidence of residual and flow cytometric assessment. Subsequent physical examination showed resolution of splenomegaly and normalization o f performance status. Followup CT also normalized as well per splenomegaly. The patient has been followed since and when seen in March 2013 and October 2013, was felt to be in CR. He had been seen just after recent viral illness and though he was feeling poorly, symptoms went on to improve and returned to baseline status. At 6 month review, the patient continues in remission. This further vindicates and since last visit, he has had no additional urinary symptoms either.   At this point we find no evidence of recurrent disease and again feel that Mr. Morales remains in complete remission. This has been the case in 03/17/2015 and again is notable 10/13/2015 as well as April 2016, August 2016 and December 2017 .  The patient was last seen December 13, 2018 doing well without evidence recurrence .       The patient, unfortunately, required hospitalization December 19 through December 24 having been admitted with pneumonia and bilateral  infiltrates associate with acute respiratory failure.  His studies went on to reveal evidence of metapneumovirus infection and, again, he is felt to have pneumonia with immunocompromise state, treated with broad-spectrum antibiotics and ICU management.  This included IV steroids and additional assessment included an IgG reduced at 497, IgA 104, IgM 31 and IgA 58.  As result he was given IV immunoglobulin at 400 mg/kg.   Fortunately he went on to slowly and steadily improve able to be switched to oral antibiotic therapies, tapering steroids and was able to be discharged home.  Chest x-ray December 23 prior to discharge included lungs now that were expanded with marked improvement in previous extensive bilateral infiltrates, cardiomegaly also noted.  As he and his wife are seen back January 7, 2020 we have discussed that he should be reassessed per hemoglobin levels including a possible subclass deficiency and be considered for potential prophylaxis with IVIG.  This is not absolute and that he has done well for many years without requiring such infusions but does bear further assessment.     The patient is next seen May 27, 2020 with immunoglobulins remaining relatively stable at above 600 drawn May 18, 2020 for IVIG, at which time subclasses were also acceptable, recheck May 27 with IgG of 614.  At this point supplementation is not necessary that we may review this as the fall approaches.  The patient agreeable to this plan.   The patient is reassessed August 24 2020 with repeat hemoglobin levels that are at the lower end of normal for IgG.  You will not need immunoglobulin at this point.  He and his wife do plan a trip to [place] likely in December.  We will check him prior to that.    Plan:    *Return for assessment in 11 weeks, immunoglobulin levels, CMP and CBC    *12 weeks MD    You have chosen to receive care through a telephone visit today. Do you consent to use a telephone visit for your medical care today?  Yes     This visit has been rescheduled as a phone visit to comply with patient safety concerns in accordance with CDC recommendations. Total time of discussion was 15 minutes.    25445 is 5-10 minutes  [id number] is 11-20 minutes  99187 is 21 or more minutes

## 2020-11-09 ENCOUNTER — APPOINTMENT (OUTPATIENT)
Dept: LAB | Facility: HOSPITAL | Age: 60
End: 2020-11-09

## 2020-11-09 NOTE — PROGRESS NOTES
Subjective Patient, again, doing well physically.    REASONS FOR FOLLOWUP:    1. Presentation with thrombocytopenia, mild leukopenia and splenomegaly.   2. Diagnosis hairy cell leukemia.   3. Status post hospitalization 02/06/2012-02/15/2012 per treatment with cladribine (2CdA x 7 days continuous IV infusion).   4. Evidence of CR noted 05/09/2012 with interval resolution of splenomegaly, notable on physical exam and peripheral blood smear.   5. Patien t with continued CR noted 09/05/2012, every 3 month assessment to 1 year anniversary planned, every 4 months 2nd year, every 6 months years 3-5.   6. Patient seen 03/05/2013 stable, with plans to move to every 4 month assessment. Patient proceeding through t reatment with Suboxone.   7. The patient was seen on 10/25/2013, stable hematologically and reassessment q.6 months planned.   8. The patient was seen 04/10/2014, status post recent apparent viral illness with normalization of peripheral blood counts and symptoms.   9. Patient seen 09/26/2014, stable - continued remission noted.   10. Patient seen on 03/17/2015, stable in continued remission, every 6 month assessment planned.   11. Patient seen every 6 months to yearly without evidence recurrence-reviewed December 2018  12. Hospitalization December 19-December 24-acute respiratory failure with hypoxia, metapneumovirus  13. Patient assessed May 27, 2020, no additional respiratory issues.  Plans made to reassess immunoglobulin levels and supplement as needed  14. Patient reassessed August 2020 with acceptable immunoglobulin levels.  15. Patient assessed November 16, 2020, stable, reexamination prior to [place] stay in December 2020                         History of Present Illness     The patient is a 60-year-old male who has been previously healthy. He had some routine blood work performed recently with Dr. Sinclair on 01/17/2012 showing significant thrombocytopenia with a platelet count of 79,000. His white  count was low normal at 4700 with decreased granulocytes at 31% with lymphocytes elevated at 55%. His serum chemistries at the time were unremarkable including normal liver function tests. Mr. Morales had been feeling reasonably w ell although he reported some fatigue. He has had no fevers, chills, night sweats or unexplained weight loss. He has been on a weight loss program and following a low carb diet and has intentionally lost about 40 pounds in the last year or two. He had noted some easier bruising but had been taking aspirin and fish oil even prior to noticing his platelet count was low.   On his examination in the office 01/31/2012 the patient had significant splenomegaly with spleen tip palpable approximately 7-8 cm below the left costal margin. The spleen is not particularly tender and did not extend to midline. Peripheral smear showed large platelets and atypical lymphocytes with no blasts. Mr. Morales was assessed by flow cytometry peripheral blood showing androgeni c profile consistent with hairy cell leukemia. Bone marrow aspirate and biopsy also was confirmatory for hairy cell leukemia with normal chromosomal complement. 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 Additionally CT abdomen and pelvis demonstrated splenomegaly with spleen measuring 22 cm in craniocaudal extent up to 16.7 x 8 in the axial plane. No other abnormalities were present. After discussion the patient was advised per linda osborn with 2CdA. He was admitted 02/06/2015-02/15/2012 with a dual lumen PICC line placed. 2CdA began after initial hydration and continued over the subsequent 7 days by continuous IV infusion. He did relatively well except for weakness and fatigue. He ha d additional issues with constipation and exacerbation of chronic low back pain. He further had development of neutropenia 02/12/2012 and was placed on Diflucan, Valtrex and Levaquin. These  he also tolerated well. He also required transfusion just prior to discharge. Finally the patient was asked to return 02/16/2012 for Neulasta which he did return back to take in the office. He subsequently has done relatively well as an outpatient with white count increasing to 13,000 by 02/20/2012 and peaking at 20 , 000 by 02/24/2012. Platelet count also improved substantially peaking 02/23/2012 at 177,000. He has otherwise returned and we were able to remove his PICC line by 02/24/2012 and on 02/27/2012 continued resolution of his leukopenia, antiviral and antibac t erial medication discontinued. The patient now returns today feeling weak in the morning but otherwise doing well for the rest of the day without any fevers, chills, and resolution of night sweats. Appetite, weight and performance status have remained o verall excellent. He has an episode of gouty arthropathy involving his left great toe. He used Indocin briefly and then went back to allopurinol but is now having his gout return this morning.   The patient thereafter did use Indocin successful and thereafter restarted allopurinol. His gouty arthropathy responded appropriately and has not returned. Followup counts were requested and as he returns today Mr. Morales is feeling well. He has returne d to exercise and nearly to his full-time job. He still notes some degree of fatigue in the a.m. and by the early afternoon approximately 12:30-1:00 p.m. He is at work full-time. He has had no fever, chills, weight loss, night sweats, nausea or vomiting, c hange in bowel habit.   The patient was asked to return back for followup reassessment. CT scan 05/02/2012 per splenomegaly showed spleen to have regressed substantially in size. The AP in 1st dimension previously 16.7 x 8 is now 12.2 by 6.1. No other abno rmalities were present. Peripheral smear also shows no evidence of recurrent disease. The patient feels well and has returned to normal activity.   The  patient was asked to return for followup now seen 09/05/2012 doing well with normal activity again with considerable improvement in his general performance status.   The patient is now seen 03/05/2013 continuing to do well. It came to the attention however through a [facility] report that he is current on Suboxone through Dr. Kalia Davidson. In discussion with the patient he evidently had difficulty in trying to discontinue pain medications in the spring of 2012. He eventually saw Dr. Davidson who was hoping to wean him altogether from pain medications with the use of Suboxone. 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 He does this primarily through diet at present.   The patient thereafter was asked to return in followup. He is now seen on 10/25/2013 feeling well overall, continuing with exercise and dietary program. He feels well overall except for mild headache, approximately at 3:00-4:00 p.m. each day(?).   The patient thereafter was asked to be seen back 6 months later . He is now seen 09/26/2014 doing well continuing his exercise and dietary program to terrific effect. He has had no issues since last seen and in fact has normalized his hypertension as a result of his dietary program.   The patient thereafter was asked to be seen 6 months later. He continues to do his exercise and dietary program to wonderful effect. He has had no additional issues since last seen.   The patient is now seen back 6 months later, 10/13/2015, and fortunately does continue to do amazingly well. He has maintained his exercise and dietary program, again to excellent and continued significant effect.   Patient now reviewed August 04, 2016.  He continues to feel well except for periodic fatigue.    The patient is reviewed December 04, 2017.  Hematologically he is stable and remains in remission.  Plans  are to eventually discontinue Suboxone as well.    Patient is next seen December 13, 2018.  He continues to exercise regularly and feels generally good though has been concerned about possibly splenic discomfort.  He also describes that his mother is been diagnosed with neuroendocrine carcinoma involving the bowel and is undergoing treatment up to and including immunotherapy.    The patient, unfortunately, required hospitalization December 19 through December 24 having been admitted with pneumonia and bilateral infiltrates associate with acute respiratory failure.  His studies went on to reveal evidence of metapneumovirus infection and, again, he is felt to have pneumonia with immunocompromise state, treated with broad-spectrum antibiotics and ICU management.  This included IV steroids and additional assessment included an IgG reduced at 497, IgA 104, IgM 31 and IgA 58.  As result he was given IV immunoglobulin at 400 mg/kg.   Fortunately he went on to slowly and steadily improve able to be switched to oral antibiotic therapies, tapering steroids and was able to be discharged home.  Chest x-ray December 23 prior to discharge included lungs now that were expanded with marked improvement in previous extensive bilateral infiltrates, cardiomegaly also noted.  Patient is next seen January 17, 2020 markedly improved and nearly back to normal activity.  We discussed immunoglobulin reassessment and possibly prophylaxis.  The patient is next seen May 27, 2020 and continues to feel well.  He has had no additional infectious complications and peripheral smear is normal when seen in office today.  At the time of this dictation his immunoglobulins have returned as consistent with IgA of 85, IgG of 614 and IgM of 26.  At this point additional supplementation is not necessary but may become important as we move into the fall.    We had the patient return for follow-up assessing him for immunoglobulin levels August 17 with IgG of  590, IgA of 90, IgM 27, no monoclonality.  IV immunoglobulin was nonapproved as result.    He is now contacted by telephone and he is feeling well. We discussed his current immunnoglobulin levels that do not warrant prophylactic IVIG at this point.  We would want to assess this in November just prior to a planned 2-month stay in [place].     The patient is next seen November 16, 2020 doing well though he now plans his trip to [place] in December.  We discussed reassessment prior to that trip.  Past Medical History:   Diagnosis Date   • Asthma    • Cancer (CMS/HCC)     hairy cell leukemia   • Coronary artery disease     minimal with some minimal narrowing of 1 vessel   • ED (erectile dysfunction) of organic origin    • H/O vitamin D deficiency    • Hypertension    • Night sweats    • Pneumonia    • Thrombocytopenia (CMS/HCC)        ONCOLOGIC HISTORY:  (History from previous dates can be found in the separate document.)    Current Outpatient Medications on File Prior to Visit   Medication Sig Dispense Refill   • albuterol sulfate  (90 Base) MCG/ACT inhaler Inhale 2 puffs Every 4 (Four) Hours As Needed for Wheezing. 1 inhaler 3   • buprenorphine-naloxone (SUBOXONE) 8-2 MG per SL tablet TAKE 2 TABLETS UNDER TONGUE EVERY MORNING CS [employer] SC HL  0   • senna (SENOKOT) 8.6 MG tablet Take 1 tablet by mouth As Needed for Constipation.     • sildenafil (REVATIO) 20 MG tablet USE 1 SUGAR AS DIRECTED AS NEEDED 10 tablet 5   • vardenafil (LEVITRA) 20 MG tablet TAKE 1 TABLET BY MOUTH AS NEEDED FOR ERECTILE DYSFUNCTION 20 tablet 3     No current facility-administered medications on file prior to visit.        ALLERGIES:     Allergies   Allergen Reactions   • Ciprofloxacin Rash       Social History     Socioeconomic History   • Marital status:      Spouse name: Radha   • Number of children: Not on file   • Years of education: Not on file   • Highest education level: Not on file   Occupational History   •  "Occupation:      Employer: [employer]   Tobacco Use   • Smoking status: Never Smoker   • Smokeless tobacco: Never Used   Substance and Sexual Activity   • Alcohol use: Yes     Alcohol/week: 2.0 standard drinks     Types: 2 Cans of beer per week     Comment: social   • Drug use: No   • Sexual activity: Defer         Cancer-related family history includes Colon cancer in his mother.     Review of Systems   Constitutional: Negative for fatigue.   Respiratory: Negative for chest tightness and shortness of breath.    Gastrointestinal: Negative for constipation, diarrhea, nausea and vomiting.   Neurological: Negative for weakness.   All other systems reviewed and are negative.    A comprehensive 14 point review of systems was performed and was negative except as mentioned.    Objective      Vitals:    11/16/20 1348   BP: 149/86   Pulse: 57   Resp: 18   Temp: 97.7 °F (36.5 °C)   TempSrc: Temporal   SpO2: 97%   Weight: 70.9 kg (156 lb 4.8 oz)   Height: 161.3 cm (63.5\")   PainSc: 0-No pain     Current Status 11/16/2020   ECOG score 0        Physical Exam    GENERAL: Well-developed, well-nourished  male in no acute distress.   SKIN: Warm, dry without rashes, purpura or petechiae.   HEAD: Normocephalic.   EYES: Pupils equal, round and reactive to light. EOMs intact. Conjunctivae normal.   EARS: Hearing intact.   NOSE: Septum midline. No excoriations or nasal discharge.   MOUTH: Tongue is well-papillated; no stomatitis or ulcers. Lips normal.   THROAT: Oropharynx without lesions or exudates.   NECK: Supple with good range of motion; no thyromegaly or masses, no JVD or bruits.   LYMPHATICS: No cervical, supraclavicular, axillary or inguinal adenopathy.   CHEST: Lungs clear to percussion and auscultation.   CARDIAC: Regular rate and rhythm without murmurs, rubs or gallops.   ABDOMEN: Soft, nontender with no organomegaly or masses.   EXTREMITIES: No clubbing, cyanosis or edema.   NEUROLOGICAL: No " focal neurological deficits.    RECENT LABS:  Hematology WBC   Date Value Ref Range Status   11/10/2020 4.00 3.40 - 10.80 10*3/mm3 Final   02/20/2020 3.85 3.40 - 10.80 10*3/mm3 Final     RBC   Date Value Ref Range Status   11/10/2020 4.56 4.14 - 5.80 10*6/mm3 Final   02/20/2020 4.55 4.14 - 5.80 10*6/mm3 Final     Hemoglobin   Date Value Ref Range Status   11/10/2020 13.3 13.0 - 17.7 g/dL Final     Hematocrit   Date Value Ref Range Status   11/10/2020 39.6 37.5 - 51.0 % Final     Platelets   Date Value Ref Range Status   11/10/2020 144 140 - 450 10*3/mm3 Final      XR CHEST 1 VW 12/21/19    FINDINGS: Heart size is borderline. Extensive bilateral infiltrates  appear similar to prior exam. No pleural effusion, or pneumothorax. No  acute osseous process.     IMPRESSION:  No significant change, continued follow-up recommended.         Assessment/Plan       A 60-year-old male with a history of hairy cell leukemia. He presented with leukopenia, thrombocytopenia, and splenomegaly. After diagnosis was confirmed he was treated with cladribine 02/06/2012-02/15/2012 by continuous IV infusion. ALLYSON e developed cytopenias, but again not to a serious degree and ultimately exam in late February 2012 was negative for evidence of residual and flow cytometric assessment. Subsequent physical examination showed resolution of splenomegaly and normalization o f performance status. Followup CT also normalized as well per splenomegaly. The patient has been followed since and when seen in March 2013 and October 2013, was felt to be in CR. He had been seen just after recent viral illness and though he was feeling poorly, symptoms went on to improve and returned to baseline status. At 6 month review, the patient continues in remission. This further vindicates and since last visit, he has had no additional urinary symptoms either.   At this point we find no evidence of recurrent disease and again feel that Mr. Morales remains in complete  remission. This has been the case in 03/17/2015 and again is notable 10/13/2015 as well as April 2016, August 2016 and December 2017 .  The patient was last seen December 13, 2018 doing well without evidence recurrence .       The patient, unfortunately, required hospitalization December 19 through December 24 having been admitted with pneumonia and bilateral infiltrates associate with acute respiratory failure.  His studies went on to reveal evidence of metapneumovirus infection and, again, he is felt to have pneumonia with immunocompromise state, treated with broad-spectrum antibiotics and ICU management.  This included IV steroids and additional assessment included an IgG reduced at 497, IgA 104, IgM 31 and IgA 58.  As result he was given IV immunoglobulin at 400 mg/kg.   Fortunately he went on to slowly and steadily improve able to be switched to oral antibiotic therapies, tapering steroids and was able to be discharged home.  Chest x-ray December 23 prior to discharge included lungs now that were expanded with marked improvement in previous extensive bilateral infiltrates, cardiomegaly also noted.  As he and his wife are seen back January 7, 2020 we have discussed that he should be reassessed per hemoglobin levels including a possible subclass deficiency and be considered for potential prophylaxis with IVIG.  This is not absolute and that he has done well for many years without requiring such infusions but does bear further assessment.     The patient is next seen May 27, 2020 with immunoglobulins remaining relatively stable at above 600 drawn May 18, 2020 for IVIG, at which time subclasses were also acceptable, recheck May 27 with IgG of 614.  At this point supplementation is not necessary that we may review this as the fall approaches.  The patient agreeable to this plan.   The patient is reassessed August 24 2020 with repeat hemoglobin levels that are at the lower end of normal for IgG.  You will not need  immunoglobulin at this point.  He and his wife do plan a trip to [place] likely in December.  We will check him prior to that.  The patient is reassessed November 6, 2020 with IgG of 582, IgM of 27 and IgA of 87.  He is doing well physically though we will ask him to reassessed prior to his planned trip to [place]:    *Return December 15, 2020 for repeat immunoglobulin levels    *Return December 22, MD possible IVIG

## 2020-11-10 ENCOUNTER — LAB (OUTPATIENT)
Dept: LAB | Facility: HOSPITAL | Age: 60
End: 2020-11-10

## 2020-11-10 DIAGNOSIS — C91.41 HAIRY CELL LEUKEMIA, IN REMISSION (HCC): ICD-10-CM

## 2020-11-10 DIAGNOSIS — D80.1 HYPOGAMMAGLOBULINEMIA (HCC): ICD-10-CM

## 2020-11-10 LAB
ALBUMIN SERPL-MCNC: 4.5 G/DL (ref 3.5–5.2)
ALBUMIN/GLOB SERPL: 2.5 G/DL (ref 1.1–2.4)
ALP SERPL-CCNC: 36 U/L (ref 38–116)
ALT SERPL W P-5'-P-CCNC: 16 U/L (ref 0–41)
ANION GAP SERPL CALCULATED.3IONS-SCNC: 8.6 MMOL/L (ref 5–15)
AST SERPL-CCNC: 19 U/L (ref 0–40)
BASOPHILS # BLD AUTO: 0.01 10*3/MM3 (ref 0–0.2)
BASOPHILS NFR BLD AUTO: 0.3 % (ref 0–1.5)
BILIRUB SERPL-MCNC: 0.4 MG/DL (ref 0.2–1.2)
BUN SERPL-MCNC: 23 MG/DL (ref 6–20)
BUN/CREAT SERPL: 23 (ref 7.3–30)
CALCIUM SPEC-SCNC: 9.5 MG/DL (ref 8.5–10.2)
CHLORIDE SERPL-SCNC: 105 MMOL/L (ref 98–107)
CO2 SERPL-SCNC: 25.4 MMOL/L (ref 22–29)
CREAT SERPL-MCNC: 1 MG/DL (ref 0.7–1.3)
DEPRECATED RDW RBC AUTO: 40 FL (ref 37–54)
EOSINOPHIL # BLD AUTO: 0.05 10*3/MM3 (ref 0–0.4)
EOSINOPHIL NFR BLD AUTO: 1.3 % (ref 0.3–6.2)
ERYTHROCYTE [DISTWIDTH] IN BLOOD BY AUTOMATED COUNT: 12.7 % (ref 12.3–15.4)
GFR SERPL CREATININE-BSD FRML MDRD: 76 ML/MIN/1.73
GLOBULIN UR ELPH-MCNC: 1.8 GM/DL (ref 1.8–3.5)
GLUCOSE SERPL-MCNC: 100 MG/DL (ref 74–124)
HCT VFR BLD AUTO: 39.6 % (ref 37.5–51)
HGB BLD-MCNC: 13.3 G/DL (ref 13–17.7)
IGA1 MFR SER: 87 MG/DL (ref 70–400)
IGG1 SER-MCNC: 582 MG/DL (ref 700–1600)
IGM SERPL-MCNC: 27 MG/DL (ref 40–230)
IMM GRANULOCYTES # BLD AUTO: 0.01 10*3/MM3 (ref 0–0.05)
IMM GRANULOCYTES NFR BLD AUTO: 0.3 % (ref 0–0.5)
LYMPHOCYTES # BLD AUTO: 0.79 10*3/MM3 (ref 0.7–3.1)
LYMPHOCYTES NFR BLD AUTO: 19.8 % (ref 19.6–45.3)
MCH RBC QN AUTO: 29.2 PG (ref 26.6–33)
MCHC RBC AUTO-ENTMCNC: 33.6 G/DL (ref 31.5–35.7)
MCV RBC AUTO: 86.8 FL (ref 79–97)
MONOCYTES # BLD AUTO: 0.16 10*3/MM3 (ref 0.1–0.9)
MONOCYTES NFR BLD AUTO: 4 % (ref 5–12)
NEUTROPHILS NFR BLD AUTO: 2.98 10*3/MM3 (ref 1.7–7)
NEUTROPHILS NFR BLD AUTO: 74.3 % (ref 42.7–76)
NRBC BLD AUTO-RTO: 0 /100 WBC (ref 0–0.2)
PLATELET # BLD AUTO: 144 10*3/MM3 (ref 140–450)
PMV BLD AUTO: 9.9 FL (ref 6–12)
POTASSIUM SERPL-SCNC: 4.3 MMOL/L (ref 3.5–4.7)
PROT SERPL-MCNC: 6.3 G/DL (ref 6.3–8)
RBC # BLD AUTO: 4.56 10*6/MM3 (ref 4.14–5.8)
SODIUM SERPL-SCNC: 139 MMOL/L (ref 134–145)
WBC # BLD AUTO: 4 10*3/MM3 (ref 3.4–10.8)

## 2020-11-10 PROCEDURE — 85025 COMPLETE CBC W/AUTO DIFF WBC: CPT

## 2020-11-10 PROCEDURE — 82784 ASSAY IGA/IGD/IGG/IGM EACH: CPT

## 2020-11-10 PROCEDURE — 36415 COLL VENOUS BLD VENIPUNCTURE: CPT

## 2020-11-10 PROCEDURE — 80053 COMPREHEN METABOLIC PANEL: CPT

## 2020-11-16 ENCOUNTER — OFFICE VISIT (OUTPATIENT)
Dept: ONCOLOGY | Facility: CLINIC | Age: 60
End: 2020-11-16

## 2020-11-16 ENCOUNTER — APPOINTMENT (OUTPATIENT)
Dept: LAB | Facility: HOSPITAL | Age: 60
End: 2020-11-16

## 2020-11-16 VITALS
SYSTOLIC BLOOD PRESSURE: 149 MMHG | HEIGHT: 64 IN | OXYGEN SATURATION: 97 % | DIASTOLIC BLOOD PRESSURE: 86 MMHG | HEART RATE: 57 BPM | TEMPERATURE: 97.7 F | BODY MASS INDEX: 26.69 KG/M2 | RESPIRATION RATE: 18 BRPM | WEIGHT: 156.3 LBS

## 2020-11-16 DIAGNOSIS — D80.1 HYPOGAMMAGLOBULINEMIA (HCC): ICD-10-CM

## 2020-11-16 DIAGNOSIS — C91.41 HAIRY CELL LEUKEMIA, IN REMISSION (HCC): Primary | ICD-10-CM

## 2020-11-16 PROCEDURE — 99213 OFFICE O/P EST LOW 20 MIN: CPT | Performed by: INTERNAL MEDICINE

## 2020-12-02 RX ORDER — VARDENAFIL HYDROCHLORIDE 20 MG/1
20 TABLET ORAL AS NEEDED
Qty: 20 TABLET | Refills: 3 | Status: SHIPPED | OUTPATIENT
Start: 2020-12-02

## 2020-12-14 ENCOUNTER — LAB (OUTPATIENT)
Dept: LAB | Facility: HOSPITAL | Age: 60
End: 2020-12-14

## 2020-12-14 DIAGNOSIS — C91.41 HAIRY CELL LEUKEMIA, IN REMISSION (HCC): ICD-10-CM

## 2020-12-14 DIAGNOSIS — D80.1 HYPOGAMMAGLOBULINEMIA (HCC): ICD-10-CM

## 2020-12-14 LAB
BASOPHILS # BLD AUTO: 0.01 10*3/MM3 (ref 0–0.2)
BASOPHILS NFR BLD AUTO: 0.2 % (ref 0–1.5)
DEPRECATED RDW RBC AUTO: 39.8 FL (ref 37–54)
EOSINOPHIL # BLD AUTO: 0.07 10*3/MM3 (ref 0–0.4)
EOSINOPHIL NFR BLD AUTO: 1.5 % (ref 0.3–6.2)
ERYTHROCYTE [DISTWIDTH] IN BLOOD BY AUTOMATED COUNT: 12.8 % (ref 12.3–15.4)
HCT VFR BLD AUTO: 40.5 % (ref 37.5–51)
HGB BLD-MCNC: 13.5 G/DL (ref 13–17.7)
IGA1 MFR SER: 78 MG/DL (ref 70–400)
IGG1 SER-MCNC: 567 MG/DL (ref 700–1600)
IGM SERPL-MCNC: 31 MG/DL (ref 40–230)
IMM GRANULOCYTES # BLD AUTO: 0.01 10*3/MM3 (ref 0–0.05)
IMM GRANULOCYTES NFR BLD AUTO: 0.2 % (ref 0–0.5)
LYMPHOCYTES # BLD AUTO: 0.99 10*3/MM3 (ref 0.7–3.1)
LYMPHOCYTES NFR BLD AUTO: 21.7 % (ref 19.6–45.3)
MCH RBC QN AUTO: 28.8 PG (ref 26.6–33)
MCHC RBC AUTO-ENTMCNC: 33.3 G/DL (ref 31.5–35.7)
MCV RBC AUTO: 86.5 FL (ref 79–97)
MONOCYTES # BLD AUTO: 0.18 10*3/MM3 (ref 0.1–0.9)
MONOCYTES NFR BLD AUTO: 3.9 % (ref 5–12)
NEUTROPHILS NFR BLD AUTO: 3.3 10*3/MM3 (ref 1.7–7)
NEUTROPHILS NFR BLD AUTO: 72.5 % (ref 42.7–76)
NRBC BLD AUTO-RTO: 0 /100 WBC (ref 0–0.2)
PLATELET # BLD AUTO: 143 10*3/MM3 (ref 140–450)
PMV BLD AUTO: 10.2 FL (ref 6–12)
RBC # BLD AUTO: 4.68 10*6/MM3 (ref 4.14–5.8)
WBC # BLD AUTO: 4.56 10*3/MM3 (ref 3.4–10.8)

## 2020-12-14 PROCEDURE — 82784 ASSAY IGA/IGD/IGG/IGM EACH: CPT | Performed by: INTERNAL MEDICINE

## 2020-12-14 PROCEDURE — 85025 COMPLETE CBC W/AUTO DIFF WBC: CPT

## 2020-12-14 PROCEDURE — 36415 COLL VENOUS BLD VENIPUNCTURE: CPT

## 2020-12-14 NOTE — PROGRESS NOTES
Subjective Patient, again, doing well physically.  Plans trip to [place] for 2 months    REASONS FOR FOLLOWUP:    1. Presentation with thrombocytopenia, mild leukopenia and splenomegaly.   2. Diagnosis hairy cell leukemia.   3. Status post hospitalization 02/06/2012-02/15/2012 per treatment with cladribine (2CdA x 7 days continuous IV infusion).   4. Evidence of CR noted 05/09/2012 with interval resolution of splenomegaly, notable on physical exam and peripheral blood smear.   5. Patien t with continued CR noted 09/05/2012, every 3 month assessment to 1 year anniversary planned, every 4 months 2nd year, every 6 months years 3-5.   6. Patient seen 03/05/2013 stable, with plans to move to every 4 month assessment. Patient proceeding through t reatment with Suboxone.   7. The patient was seen on 10/25/2013, stable hematologically and reassessment q.6 months planned.   8. The patient was seen 04/10/2014, status post recent apparent viral illness with normalization of peripheral blood counts and symptoms.   9. Patient seen 09/26/2014, stable - continued remission noted.   10. Patient seen on 03/17/2015, stable in continued remission, every 6 month assessment planned.   11. Patient seen every 6 months to yearly without evidence recurrence-reviewed December 2018  12. Hospitalization December 19-December 24-acute respiratory failure with hypoxia, metapneumovirus  13. Patient assessed May 27, 2020, no additional respiratory issues.  Plans made to reassess immunoglobulin levels and supplement as needed  14. Patient reassessed August 2020 with acceptable immunoglobulin levels.  15. Patient assessed November 16, 2020, stable, reexamination prior to [place] stay in December 2020  16. Patient assessed December 21, 2020, acceptable immunoglobulin levels, [facility] x2 months planned                         History of Present Illness     The patient is a 60-year-old male who has been previously healthy. He had some routine  blood work performed recently with Dr. Sinclair on 01/17/2012 showing significant thrombocytopenia with a platelet count of 79,000. His white count was low normal at 4700 with decreased granulocytes at 31% with lymphocytes elevated at 55%. His serum chemistries at the time were unremarkable including normal liver function tests. Mr. Morales had been feeling reasonably w ell although he reported some fatigue. He has had no fevers, chills, night sweats or unexplained weight loss. He has been on a weight loss program and following a low carb diet and has intentionally lost about 40 pounds in the last year or two. He had noted some easier bruising but had been taking aspirin and fish oil even prior to noticing his platelet count was low.   On his examination in the office 01/31/2012 the patient had significant splenomegaly with spleen tip palpable approximately 7-8 cm below the left costal margin. The spleen is not particularly tender and did not extend to midline. Peripheral smear showed large platelets and atypical lymphocytes with no blasts. Mr. Morales was assessed by flow cytometry peripheral blood showing androgeni c profile consistent with hairy cell leukemia. Bone marrow aspirate and biopsy also was confirmatory for hairy cell leukemia with normal chromosomal complement. 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 Additionally CT abdomen and pelvis demonstrated splenomegaly with spleen measuring 22 cm in craniocaudal extent up to 16.7 x 8 in the axial plane. No other abnormalities were present. After discussion the patient was advised per linda osborn with 2CdA. He was admitted 02/06/2015-02/15/2012 with a dual lumen PICC line placed. 2CdA began after initial hydration and continued over the subsequent 7 days by continuous IV infusion. He did relatively well except for weakness and fatigue. He ha d additional issues with constipation and  exacerbation of chronic low back pain. He further had development of neutropenia 02/12/2012 and was placed on Diflucan, Valtrex and Levaquin. These he also tolerated well. He also required transfusion just prior to discharge. Finally the patient was asked to return 02/16/2012 for Neulasta which he did return back to take in the office. He subsequently has done relatively well as an outpatient with white count increasing to 13,000 by 02/20/2012 and peaking at 20 , 000 by 02/24/2012. Platelet count also improved substantially peaking 02/23/2012 at 177,000. He has otherwise returned and we were able to remove his PICC line by 02/24/2012 and on 02/27/2012 continued resolution of his leukopenia, antiviral and antibac t erial medication discontinued. The patient now returns today feeling weak in the morning but otherwise doing well for the rest of the day without any fevers, chills, and resolution of night sweats. Appetite, weight and performance status have remained o verall excellent. He has an episode of gouty arthropathy involving his left great toe. He used Indocin briefly and then went back to allopurinol but is now having his gout return this morning.   The patient thereafter did use Indocin successful and thereafter restarted allopurinol. His gouty arthropathy responded appropriately and has not returned. Followup counts were requested and as he returns today Mr. Morales is feeling well. He has returne d to exercise and nearly to his full-time job. He still notes some degree of fatigue in the a.m. and by the early afternoon approximately 12:30-1:00 p.m. He is at work full-time. He has had no fever, chills, weight loss, night sweats, nausea or vomiting, c hange in bowel habit.   The patient was asked to return back for followup reassessment. CT scan 05/02/2012 per splenomegaly showed spleen to have regressed substantially in size. The AP in 1st dimension previously 16.7 x 8 is now 12.2 by 6.1. No other abno  rmalities were present. Peripheral smear also shows no evidence of recurrent disease. The patient feels well and has returned to normal activity.   The patient was asked to return for followup now seen 09/05/2012 doing well with normal activity again with considerable improvement in his general performance status.   The patient is now seen 03/05/2013 continuing to do well. It came to the attention however through a [facility] report that he is current on Suboxone through Dr. Kalia Davidson. In discussion with the patient he evidently had difficulty in trying to discontinue pain medications in the spring of 2012. He eventually saw Dr. Davidson who was hoping to wean him altogether from pain medications with the use of Suboxone. 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 He does this primarily through diet at present.   The patient thereafter was asked to return in followup. He is now seen on 10/25/2013 feeling well overall, continuing with exercise and dietary program. He feels well overall except for mild headache, approximately at 3:00-4:00 p.m. each day(?).   The patient thereafter was asked to be seen back 6 months later . He is now seen 09/26/2014 doing well continuing his exercise and dietary program to terrific effect. He has had no issues since last seen and in fact has normalized his hypertension as a result of his dietary program.   The patient thereafter was asked to be seen 6 months later. He continues to do his exercise and dietary program to wonderful effect. He has had no additional issues since last seen.   The patient is now seen back 6 months later, 10/13/2015, and fortunately does continue to do amazingly well. He has maintained his exercise and dietary program, again to excellent and continued significant effect.   Patient now reviewed August 04, 2016.  He continues  to feel well except for periodic fatigue.    The patient is reviewed December 04, 2017.  Hematologically he is stable and remains in remission.  Plans are to eventually discontinue Suboxone as well.    Patient is next seen December 13, 2018.  He continues to exercise regularly and feels generally good though has been concerned about possibly splenic discomfort.  He also describes that his mother is been diagnosed with neuroendocrine carcinoma involving the bowel and is undergoing treatment up to and including immunotherapy.    The patient, unfortunately, required hospitalization December 19 through December 24 having been admitted with pneumonia and bilateral infiltrates associate with acute respiratory failure.  His studies went on to reveal evidence of metapneumovirus infection and, again, he is felt to have pneumonia with immunocompromise state, treated with broad-spectrum antibiotics and ICU management.  This included IV steroids and additional assessment included an IgG reduced at 497, IgA 104, IgM 31 and IgA 58.  As result he was given IV immunoglobulin at 400 mg/kg.   Fortunately he went on to slowly and steadily improve able to be switched to oral antibiotic therapies, tapering steroids and was able to be discharged home.  Chest x-ray December 23 prior to discharge included lungs now that were expanded with marked improvement in previous extensive bilateral infiltrates, cardiomegaly also noted.  Patient is next seen January 17, 2020 markedly improved and nearly back to normal activity.  We discussed immunoglobulin reassessment and possibly prophylaxis.  The patient is next seen May 27, 2020 and continues to feel well.  He has had no additional infectious complications and peripheral smear is normal when seen in office today.  At the time of this dictation his immunoglobulins have returned as consistent with IgA of 85, IgG of 614 and IgM of 26.  At this point additional supplementation is not necessary but  may become important as we move into the fall.    We had the patient return for follow-up assessing him for immunoglobulin levels August 17 with IgG of 590, IgA of 90, IgM 27, no monoclonality.  IV immunoglobulin was nonapproved as result.    He is now contacted by telephone and he is feeling well. We discussed his current immunnoglobulin levels that do not warrant prophylactic IVIG at this point.  We would want to assess this in November just prior to a planned 2-month stay in [place].     The patient is next seen November 16, 2020 doing well though he now plans his trip to [place] in December.  We discussed reassessment prior to that trip.  Patient returns for repeat laboratory studies with quantitative hemoglobins obtained December 14 include an IgA of 78, IgG 567 and IgM of 31.  These are adequate and will not require infusions prior to stay in [place].  Fortunately he is feeling well and we discussed that he would be a candidate for COVID-19 vaccines as they become available.    Past Medical History:   Diagnosis Date   • Asthma    • Cancer (CMS/HCC)     hairy cell leukemia   • Coronary artery disease     minimal with some minimal narrowing of 1 vessel   • ED (erectile dysfunction) of organic origin    • H/O vitamin D deficiency    • Hypertension    • Night sweats    • Pneumonia    • Thrombocytopenia (CMS/HCC)        ONCOLOGIC HISTORY:  (History from previous dates can be found in the separate document.)    Current Outpatient Medications on File Prior to Visit   Medication Sig Dispense Refill   • albuterol sulfate  (90 Base) MCG/ACT inhaler Inhale 2 puffs Every 4 (Four) Hours As Needed for Wheezing. 1 inhaler 3   • buprenorphine-naloxone (SUBOXONE) 8-2 MG per SL tablet TAKE 2 TABLETS UNDER TONGUE EVERY MORNING CS EXPRESS SC HL  0   • senna (SENOKOT) 8.6 MG tablet Take 1 tablet by mouth As Needed for Constipation.     • sildenafil (REVATIO) 20 MG tablet USE 1 SUGAR AS DIRECTED AS NEEDED 10 tablet 5   •  "vardenafil (LEVITRA) 20 MG tablet TAKE 1 TABLET BY MOUTH AS NEEDED FOR ERECTILE DYSFUNCTION 20 tablet 3     No current facility-administered medications on file prior to visit.        ALLERGIES:     Allergies   Allergen Reactions   • Ciprofloxacin Rash       Social History     Socioeconomic History   • Marital status:      Spouse name: Radha   • Number of children: Not on file   • Years of education: Not on file   • Highest education level: Not on file   Occupational History   • Occupation:      Employer: [employer]   Tobacco Use   • Smoking status: Never Smoker   • Smokeless tobacco: Never Used   Substance and Sexual Activity   • Alcohol use: Yes     Alcohol/week: 2.0 standard drinks     Types: 2 Cans of beer per week     Comment: social   • Drug use: No   • Sexual activity: Defer         Cancer-related family history includes Colon cancer in his mother.     Review of Systems   Constitutional: Negative for fatigue.   Respiratory: Negative for chest tightness and shortness of breath.    Gastrointestinal: Negative for abdominal pain, constipation, diarrhea, nausea and vomiting.   Neurological: Negative for weakness.   All other systems reviewed and are negative.    A comprehensive 14 point review of systems was performed and was negative except as mentioned.    Objective      Vitals:    12/21/20 0856   BP: 152/80   Pulse: 50   Resp: 18   Temp: 97.1 °F (36.2 °C)   TempSrc: Temporal   SpO2: 100%   Weight: 71.2 kg (156 lb 14.4 oz)   Height: 161.3 cm (63.5\")   PainSc: 0-No pain     Current Status 12/21/2020   ECOG score 0        Physical Exam    GENERAL: Well-developed, well-nourished  male in no acute distress.   SKIN: Warm, dry without rashes, purpura or petechiae.   HEAD: Normocephalic.   EYES: Pupils equal, round and reactive to light. EOMs intact. Conjunctivae normal.   EARS: Hearing intact.   NOSE: Septum midline. No excoriations or nasal discharge.   MOUTH: Tongue is " well-papillated; no stomatitis or ulcers. Lips normal.   THROAT: Oropharynx without lesions or exudates.   NECK: Supple with good range of motion; no thyromegaly or masses, no JVD or bruits.   LYMPHATICS: No cervical, supraclavicular, axillary or inguinal adenopathy.   CHEST: Lungs clear to percussion and auscultation.   CARDIAC: Regular rate and rhythm without murmurs, rubs or gallops.   ABDOMEN: Soft, nontender with no organomegaly or masses.   EXTREMITIES: No clubbing, cyanosis or edema.   NEUROLOGICAL: No focal neurological deficits.    RECENT LABS:  Hematology WBC   Date Value Ref Range Status   12/14/2020 4.56 3.40 - 10.80 10*3/mm3 Final   02/20/2020 3.85 3.40 - 10.80 10*3/mm3 Final     RBC   Date Value Ref Range Status   12/14/2020 4.68 4.14 - 5.80 10*6/mm3 Final   02/20/2020 4.55 4.14 - 5.80 10*6/mm3 Final     Hemoglobin   Date Value Ref Range Status   12/14/2020 13.5 13.0 - 17.7 g/dL Final     Hematocrit   Date Value Ref Range Status   12/14/2020 40.5 37.5 - 51.0 % Final     Platelets   Date Value Ref Range Status   12/14/2020 143 140 - 450 10*3/mm3 Final      XR CHEST 1 VW 12/21/19    FINDINGS: Heart size is borderline. Extensive bilateral infiltrates  appear similar to prior exam. No pleural effusion, or pneumothorax. No  acute osseous process.     IMPRESSION:  No significant change, continued follow-up recommended.         Assessment/Plan       A 60-year-old male with a history of hairy cell leukemia. He presented with leukopenia, thrombocytopenia, and splenomegaly. After diagnosis was confirmed he was treated with cladribine 02/06/2012-02/15/2012 by continuous IV infusion. H e developed cytopenias, but again not to a serious degree and ultimately exam in late February 2012 was negative for evidence of residual and flow cytometric assessment. Subsequent physical examination showed resolution of splenomegaly and normalization o f performance status. Followup CT also normalized as well per splenomegaly.  The patient has been followed since and when seen in March 2013 and October 2013, was felt to be in CR. He had been seen just after recent viral illness and though he was feeling poorly, symptoms went on to improve and returned to baseline status. At 6 month review, the patient continues in remission. This further vindicates and since last visit, he has had no additional urinary symptoms either.   At this point we find no evidence of recurrent disease and again feel that Mr. Morales remains in complete remission. This has been the case in 03/17/2015 and again is notable 10/13/2015 as well as April 2016, August 2016 and December 2017 .  The patient was last seen December 13, 2018 doing well without evidence recurrence .       The patient, unfortunately, required hospitalization December 19 through December 24 having been admitted with pneumonia and bilateral infiltrates associate with acute respiratory failure.  His studies went on to reveal evidence of metapneumovirus infection and, again, he is felt to have pneumonia with immunocompromise state, treated with broad-spectrum antibiotics and ICU management.  This included IV steroids and additional assessment included an IgG reduced at 497, IgA 104, IgM 31 and IgA 58.  As result he was given IV immunoglobulin at 400 mg/kg.   Fortunately he went on to slowly and steadily improve able to be switched to oral antibiotic therapies, tapering steroids and was able to be discharged home.  Chest x-ray December 23 prior to discharge included lungs now that were expanded with marked improvement in previous extensive bilateral infiltrates, cardiomegaly also noted.  As he and his wife are seen back January 7, 2020 we have discussed that he should be reassessed per hemoglobin levels including a possible subclass deficiency and be considered for potential prophylaxis with IVIG.  This is not absolute and that he has done well for many years without requiring such infusions but does  bear further assessment.     The patient is next seen May 27, 2020 with immunoglobulins remaining relatively stable at above 600 drawn May 18, 2020 for IVIG, at which time subclasses were also acceptable, recheck May 27 with IgG of 614.  At this point supplementation is not necessary that we may review this as the fall approaches.  The patient agreeable to this plan.   The patient is reassessed August 24 2020 with repeat hemoglobin levels that are at the lower end of normal for IgG.  You will not need immunoglobulin at this point.  He and his wife do plan a trip to [place] likely in December.  We will check him prior to that.  The patient is reassessed November 6, 2020 with IgG of 582, IgM of 27 and IgA of 87.  He was doing well we asked him to be reassessed December 15 for his quantitative immunoglobins prior to a stay in [place].  Fortunately these are found to be adequate and will not need to be supplemented.    Plan:  *Again no IVIG needed at this point    *The patient has adequate peripheral blood counts, no evidence recurrent disease, and is a candidate for COVID-19 vaccinations currently FDA approved.    *He will keep in contact during his trip out of [facility] for the next 2 months and would be willing to travel back for the vaccination if it became available here and not available there.    *MD follow-up late March 2021      I spent 25 total minutes, face-to-face, caring for Solo today.  Greater than 50% of this time involved counseling and/or coordination of care as documented within this note.

## 2020-12-16 PROBLEM — C91.41 HAIRY CELL LEUKEMIA, IN REMISSION: Status: ACTIVE | Noted: 2020-12-16

## 2020-12-17 ENCOUNTER — TELEPHONE (OUTPATIENT)
Dept: ONCOLOGY | Facility: CLINIC | Age: 60
End: 2020-12-17

## 2020-12-17 NOTE — TELEPHONE ENCOUNTER
----- Message from Tomasa Beyer RN sent at 12/17/2020 10:15 AM EST -----  Pt will not need IVIG on the day he sees Dr. Morrow, please take treatment apt off. Thank you

## 2020-12-21 ENCOUNTER — APPOINTMENT (OUTPATIENT)
Dept: ONCOLOGY | Facility: HOSPITAL | Age: 60
End: 2020-12-21

## 2020-12-21 ENCOUNTER — APPOINTMENT (OUTPATIENT)
Dept: LAB | Facility: HOSPITAL | Age: 60
End: 2020-12-21

## 2020-12-21 ENCOUNTER — TELEPHONE (OUTPATIENT)
Dept: INTERNAL MEDICINE | Facility: CLINIC | Age: 60
End: 2020-12-21

## 2020-12-21 ENCOUNTER — OFFICE VISIT (OUTPATIENT)
Dept: ONCOLOGY | Facility: CLINIC | Age: 60
End: 2020-12-21

## 2020-12-21 VITALS
HEART RATE: 50 BPM | RESPIRATION RATE: 18 BRPM | WEIGHT: 156.9 LBS | BODY MASS INDEX: 26.79 KG/M2 | SYSTOLIC BLOOD PRESSURE: 152 MMHG | DIASTOLIC BLOOD PRESSURE: 80 MMHG | TEMPERATURE: 97.1 F | OXYGEN SATURATION: 100 % | HEIGHT: 64 IN

## 2020-12-21 DIAGNOSIS — C91.41 HAIRY CELL LEUKEMIA, IN REMISSION (HCC): Primary | ICD-10-CM

## 2020-12-21 DIAGNOSIS — D80.1 HYPOGAMMAGLOBULINEMIA (HCC): ICD-10-CM

## 2020-12-21 PROCEDURE — 99214 OFFICE O/P EST MOD 30 MIN: CPT | Performed by: INTERNAL MEDICINE

## 2020-12-21 NOTE — TELEPHONE ENCOUNTER
PATIENT CALLED REQUESTING A CALL BACK FROM DR. JULIO REGARDING HIS GENERAL MEDICAL CARE.     PATIENT CALLBACK: [phone]

## 2020-12-22 NOTE — TELEPHONE ENCOUNTER
Spoke to pt, was asking for recommendation for new PCP after  retires,  Pt informed  has no control over that and we need to go by the list and call to get new pt clovis, pt understood. His clovis with  canceled.

## 2021-01-08 NOTE — TELEPHONE ENCOUNTER
Caller: [facility] [address] [facility] [phone] [facility] [phone]    Relationship: Pharmacy    Best call back number: [phone]    Medication needed:   Requested Prescriptions     Pending Prescriptions Disp Refills   • sildenafil (REVATIO) 20 MG tablet 10 tablet 5       When do you need the refill by: 1/8/21    What details did the patient provide when requesting the medication: MED REFILL APPROVAL. NEEDS TO BE 40MG. PHARMACY STATES THEY FAXED OVER REQUEST 1/7/21.    Does the patient have less than a 3 day supply:  [x] Yes  [] No    What is the patient's preferred pharmacy:  [facility] [address] [facility] [phone] [place] [phone] FX  [phone]

## 2021-01-11 RX ORDER — SILDENAFIL CITRATE 20 MG/1
20 TABLET ORAL DAILY
Qty: 21 TABLET | Refills: 5 | Status: SHIPPED | OUTPATIENT
Start: 2021-01-11 | End: 2021-01-19 | Stop reason: SDUPTHER

## 2021-01-19 DIAGNOSIS — N52.9 VASCULOGENIC ERECTILE DYSFUNCTION, UNSPECIFIED VASCULOGENIC ERECTILE DYSFUNCTION TYPE: Primary | ICD-10-CM

## 2021-01-19 RX ORDER — SILDENAFIL CITRATE 20 MG/1
20 TABLET ORAL DAILY
Qty: 10 TABLET | Refills: 2 | Status: SHIPPED | OUTPATIENT
Start: 2021-01-19

## 2021-01-19 NOTE — TELEPHONE ENCOUNTER
Caller: [facility] [address] [facility] [phone] [facility] [phone] FX    Relationship: Pharmacy    Best call back number: [phone]  Medication needed:   SIDENAFIL 40 MG SUGAR QUANITY 10    When do you need the refill by: TODAY     What details did the patient provide when requesting the medication: PHARMACY HAS BEEN ATTEMPTING TO GET THIS MEDICATION FOR THE PATIENT SINCE 01/04. ANY QUESTIONS PLEASE CALL IF ANY QUESTIONS    Does the patient have less than a 3 day supply:  [x] Yes  [] No    What is the patient's preferred pharmacy:      [facility] [address] [facility] [phone] [facility] [phone] FX

## 2021-03-17 ENCOUNTER — TELEPHONE (OUTPATIENT)
Dept: ONCOLOGY | Facility: CLINIC | Age: 61
End: 2021-03-17

## 2021-03-17 NOTE — TELEPHONE ENCOUNTER
Caller: CHANDNI HALL    Relationship to patient:  SELF    Best call back number: [phone]    Chief complaint: CAN'T MAKE APPT    Type of visit: FOLLOW UP & LAB    Requested date: April 20-23     If rescheduling, when is the original appointment: 3/24/21     Additional notes: PT IS REQUESTING BETWEEN April 20TH & 23RD TO BE R/S TO.

## 2021-03-19 ENCOUNTER — BULK ORDERING (OUTPATIENT)
Dept: CASE MANAGEMENT | Facility: OTHER | Age: 61
End: 2021-03-19

## 2021-03-19 DIAGNOSIS — Z23 IMMUNIZATION DUE: ICD-10-CM

## 2021-03-24 ENCOUNTER — APPOINTMENT (OUTPATIENT)
Dept: LAB | Facility: HOSPITAL | Age: 61
End: 2021-03-24

## 2021-04-26 ENCOUNTER — OFFICE VISIT (OUTPATIENT)
Dept: ONCOLOGY | Facility: CLINIC | Age: 61
End: 2021-04-26

## 2021-04-26 ENCOUNTER — LAB (OUTPATIENT)
Dept: LAB | Facility: HOSPITAL | Age: 61
End: 2021-04-26

## 2021-04-26 VITALS
BODY MASS INDEX: 27.11 KG/M2 | WEIGHT: 153 LBS | HEIGHT: 63 IN | OXYGEN SATURATION: 99 % | TEMPERATURE: 97.8 F | RESPIRATION RATE: 14 BRPM | HEART RATE: 55 BPM | DIASTOLIC BLOOD PRESSURE: 81 MMHG | SYSTOLIC BLOOD PRESSURE: 162 MMHG

## 2021-04-26 DIAGNOSIS — C91.41 HAIRY CELL LEUKEMIA, IN REMISSION (HCC): Primary | ICD-10-CM

## 2021-04-26 DIAGNOSIS — C91.41 HAIRY CELL LEUKEMIA, IN REMISSION (HCC): ICD-10-CM

## 2021-04-26 DIAGNOSIS — D80.1 HYPOGAMMAGLOBULINEMIA (HCC): ICD-10-CM

## 2021-04-26 LAB
BASOPHILS # BLD AUTO: 0.03 10*3/MM3 (ref 0–0.2)
BASOPHILS NFR BLD AUTO: 0.5 % (ref 0–1.5)
DEPRECATED RDW RBC AUTO: 38.4 FL (ref 37–54)
EOSINOPHIL # BLD AUTO: 0.08 10*3/MM3 (ref 0–0.4)
EOSINOPHIL NFR BLD AUTO: 1.4 % (ref 0.3–6.2)
ERYTHROCYTE [DISTWIDTH] IN BLOOD BY AUTOMATED COUNT: 12.8 % (ref 12.3–15.4)
HCT VFR BLD AUTO: 38.7 % (ref 37.5–51)
HGB BLD-MCNC: 13.4 G/DL (ref 13–17.7)
IMM GRANULOCYTES # BLD AUTO: 0.1 10*3/MM3 (ref 0–0.05)
IMM GRANULOCYTES NFR BLD AUTO: 1.8 % (ref 0–0.5)
LYMPHOCYTES # BLD AUTO: 1.33 10*3/MM3 (ref 0.7–3.1)
LYMPHOCYTES NFR BLD AUTO: 23.5 % (ref 19.6–45.3)
MCH RBC QN AUTO: 29 PG (ref 26.6–33)
MCHC RBC AUTO-ENTMCNC: 34.6 G/DL (ref 31.5–35.7)
MCV RBC AUTO: 83.8 FL (ref 79–97)
MONOCYTES # BLD AUTO: 0.32 10*3/MM3 (ref 0.1–0.9)
MONOCYTES NFR BLD AUTO: 5.6 % (ref 5–12)
NEUTROPHILS NFR BLD AUTO: 3.81 10*3/MM3 (ref 1.7–7)
NEUTROPHILS NFR BLD AUTO: 67.2 % (ref 42.7–76)
NRBC BLD AUTO-RTO: 0 /100 WBC (ref 0–0.2)
PLATELET # BLD AUTO: 143 10*3/MM3 (ref 140–450)
PMV BLD AUTO: 10.5 FL (ref 6–12)
RBC # BLD AUTO: 4.62 10*6/MM3 (ref 4.14–5.8)
WBC # BLD AUTO: 5.67 10*3/MM3 (ref 3.4–10.8)

## 2021-04-26 PROCEDURE — 99213 OFFICE O/P EST LOW 20 MIN: CPT | Performed by: INTERNAL MEDICINE

## 2021-04-26 PROCEDURE — 36415 COLL VENOUS BLD VENIPUNCTURE: CPT

## 2021-04-26 PROCEDURE — 85025 COMPLETE CBC W/AUTO DIFF WBC: CPT

## 2021-04-26 NOTE — PROGRESS NOTES
Subjective Patient doing well, no infectious complications                                         REASONS FOR FOLLOWUP:    1. Presentation with thrombocytopenia, mild leukopenia and splenomegaly.   2. Diagnosis hairy cell leukemia.   3. Status post hospitalization 02/06/2012-02/15/2012 per treatment with cladribine (2CdA x 7 days continuous IV infusion).   4. Evidence of CR noted 05/09/2012 with interval resolution of splenomegaly, notable on physical exam and peripheral blood smear.   5. Patien t with continued CR noted 09/05/2012, every 3 month assessment to 1 year anniversary planned, every 4 months 2nd year, every 6 months years 3-5.   6. Patient seen 03/05/2013 stable, with plans to move to every 4 month assessment. Patient proceeding through t reatment with Suboxone.   7. The patient was seen on 10/25/2013, stable hematologically and reassessment q.6 months planned.   8. The patient was seen 04/10/2014, status post recent apparent viral illness with normalization of peripheral blood counts and symptoms.   9. Patient seen 09/26/2014, stable - continued remission noted.   10. Patient seen on 03/17/2015, stable in continued remission, every 6 month assessment planned.   11. Patient seen every 6 months to yearly without evidence recurrence-reviewed December 2018  12. Hospitalization December 19-December 24-acute respiratory failure with hypoxia, metapneumovirus  13. Patient assessed May 27, 2020, no additional respiratory issues.  Plans made to reassess immunoglobulin levels and supplement as needed  14. Patient reassessed August 2020 with acceptable immunoglobulin levels.  15. Patient assessed November 16, 2020, stable, reexamination prior to [place] stay in December 2020  16. Patient assessed December 21, 2020, acceptable immunoglobulin levels, [facility] x2 months planned  17. Patient reassessed 4/26/2021 with no infectious complications.                         History of Present Illness     The patient  is a 60-year-old male who has been previously healthy. He had had some routine blood work performed recently with Dr. Sinclair on 01/17/2012 showing significant thrombocytopenia with a platelet count of 79,000. His white count was low normal at 4700 with decreased granulocytes at 31% with lymphocytes elevated at 55%. His serum chemistries at the time were unremarkable including normal liver function tests. Mr. Morales had been feeling reasonably w ell although he reported some fatigue. He has had no fevers, chills, night sweats or unexplained weight loss. He has been on a weight loss program and following a low carb diet and has intentionally lost about 40 pounds in the last year or two. He had noted some easier bruising but had been taking aspirin and fish oil even prior to noticing his platelet count was low.   On his examination in the office 01/31/2012 the patient had significant splenomegaly with spleen tip palpable approximately 7-8 cm below the left costal margin. The spleen is not particularly tender and did not extend to midline. Peripheral smear showed large platelets and atypical lymphocytes with no blasts. Mr. Morales was assessed by flow cytometry peripheral blood showing androgeni c profile consistent with hairy cell leukemia. Bone marrow aspirate and biopsy also was confirmatory for hairy cell leukemia with normal chromosomal complement. 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 Additionally CT abdomen and pelvis demonstrated splenomegaly with spleen measuring 22 cm in craniocaudal extent up to 16.7 x 8 in the axial plane. No other abnormalities were present. After discussion the patient was advised per linda osborn with 2CdA. He was admitted 02/06/2015-02/15/2012 with a dual lumen PICC line placed. 2CdA began after initial hydration and continued over the subsequent 7 days by continuous IV infusion. He did relatively well  except for weakness and fatigue. He ha d additional issues with constipation and exacerbation of chronic low back pain. He further had development of neutropenia 02/12/2012 and was placed on Diflucan, Valtrex and Levaquin. These he also tolerated well. He also required transfusion just prior to discharge. Finally the patient was asked to return 02/16/2012 for Neulasta which he did return back to take in the office. He subsequently has done relatively well as an outpatient with white count increasing to 13,000 by 02/20/2012 and peaking at 20 , 000 by 02/24/2012. Platelet count also improved substantially peaking 02/23/2012 at 177,000. He has otherwise returned and we were able to remove his PICC line by 02/24/2012 and on 02/27/2012 continued resolution of his leukopenia, antiviral and antibac t erial medication discontinued. The patient now returns today feeling weak in the morning but otherwise doing well for the rest of the day without any fevers, chills, and resolution of night sweats. Appetite, weight and performance status have remained o verall excellent. He has an episode of gouty arthropathy involving his left great toe. He used Indocin briefly and then went back to allopurinol but is now having his gout return this morning.   The patient thereafter did use Indocin successful and thereafter restarted allopurinol. His gouty arthropathy responded appropriately and has not returned. Followup counts were requested and as he returns today Mr. Morales is feeling well. He has returne d to exercise and nearly to his full-time job. He still notes some degree of fatigue in the a.m. and by the early afternoon approximately 12:30-1:00 p.m. He is at work full-time. He has had no fever, chills, weight loss, night sweats, nausea or vomiting, c hange in bowel habit.   The patient was asked to return back for followup reassessment. CT scan 05/02/2012 per splenomegaly showed spleen to have regressed substantially in size. The  AP in 1st dimension previously 16.7 x 8 is now 12.2 by 6.1. No other abno rmalities were present. Peripheral smear also shows no evidence of recurrent disease. The patient feels well and has returned to normal activity.   The patient was asked to return for followup now seen 09/05/2012 doing well with normal activity again with considerable improvement in his general performance status.   The patient is now seen 03/05/2013 continuing to do well. It came to the attention however through a [facility] report that he is current on Suboxone through Dr. Kalia Davidson. In discussion with the patient he evidently had difficulty in trying to discontinue pain medications in the spring of 2012. He eventually saw Dr. Davidson who was hoping to wean him altogether from pain medications with the use of Suboxone. 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 He does this primarily through diet at present.   The patient thereafter was asked to return in followup. He is now seen on 10/25/2013 feeling well overall, continuing with exercise and dietary program. He feels well overall except for mild headache, approximately at 3:00-4:00 p.m. each day(?).   The patient thereafter was asked to be seen back 6 months later . He is now seen 09/26/2014 doing well continuing his exercise and dietary program to terrific effect. He has had no issues since last seen and in fact has normalized his hypertension as a result of his dietary program.   The patient thereafter was asked to be seen 6 months later. He continues to do his exercise and dietary program to wonderful effect. He has had no additional issues since last seen.   The patient is now seen back 6 months later, 10/13/2015, and fortunately does continue to do amazingly well. He has maintained his exercise and dietary program, again to excellent and continued  significant effect.   Patient now reviewed August 04, 2016.  He continues to feel well except for periodic fatigue.    The patient is reviewed December 04, 2017.  Hematologically he is stable and remains in remission.  Plans are to eventually discontinue Suboxone as well.    Patient is next seen December 13, 2018.  He continues to exercise regularly and feels generally good though has been concerned about possibly splenic discomfort.  He also describes that his mother is been diagnosed with neuroendocrine carcinoma involving the bowel and is undergoing treatment up to and including immunotherapy.    The patient, unfortunately, required hospitalization December 19 through December 24 having been admitted with pneumonia and bilateral infiltrates associate with acute respiratory failure.  His studies went on to reveal evidence of metapneumovirus infection and, again, he is felt to have pneumonia with immunocompromise state, treated with broad-spectrum antibiotics and ICU management.  This included IV steroids and additional assessment included an IgG reduced at 497, IgA 104, IgM 31 and IgA 58.  As result he was given IV immunoglobulin at 400 mg/kg.   Fortunately he went on to slowly and steadily improve able to be switched to oral antibiotic therapies, tapering steroids and was able to be discharged home.  Chest x-ray December 23 prior to discharge included lungs now that were expanded with marked improvement in previous extensive bilateral infiltrates, cardiomegaly also noted.  Patient is next seen January 17, 2020 markedly improved and nearly back to normal activity.  We discussed immunoglobulin reassessment and possibly prophylaxis.  The patient is next seen May 27, 2020 and continues to feel well.  He has had no additional infectious complications and peripheral smear is normal when seen in office today.  At the time of this dictation his immunoglobulins have returned as consistent with IgA of 85, IgG of 614 and  IgM of 26.  At this point additional supplementation is not necessary but may become important as we move into the fall.    We had the patient return for follow-up assessing him for immunoglobulin levels August 17 with IgG of 590, IgA of 90, IgM 27, no monoclonality.  IV immunoglobulin was nonapproved as result.    He is now contacted by telephone and he is feeling well. We discussed his current immunnoglobulin levels that do not warrant prophylactic IVIG at this point.  We would want to assess this in November just prior to a planned 2-month stay in [place].     The patient is next seen November 16, 2020 doing well though he now plans his trip to [place] in December.  We discussed reassessment prior to that trip.  Patient returns for repeat laboratory studies with quantitative hemoglobins obtained December 14 include an IgA of 78, IgG 567 and IgM of 31.  These are adequate and will not require infusions prior to stay in [place].  Fortunately he is feeling well and we discussed that he would be a candidate for COVID-19 vaccines as they become available.  The patient went on to be vaccinated and thereafter wintered in [place] for approximately 2 months. He is next reassessed 4/26/2021 having had no infectious complications and with a normal peripheral blood smear.    Past Medical History:   Diagnosis Date   • Asthma    • Cancer (CMS/HCC)     hairy cell leukemia   • Coronary artery disease     minimal with some minimal narrowing of 1 vessel   • ED (erectile dysfunction) of organic origin    • H/O vitamin D deficiency    • Hypertension    • Night sweats    • Pneumonia    • Thrombocytopenia (CMS/HCC)        ONCOLOGIC HISTORY:  (History from previous dates can be found in the separate document.)    Current Outpatient Medications on File Prior to Visit   Medication Sig Dispense Refill   • albuterol sulfate  (90 Base) MCG/ACT inhaler Inhale 2 puffs Every 4 (Four) Hours As Needed for Wheezing. 1 inhaler 3   •  "buprenorphine-naloxone (SUBOXONE) 8-2 MG per SL tablet TAKE 2 TABLETS UNDER TONGUE EVERY MORNING CS EXPRESS SC HL  0   • senna (SENOKOT) 8.6 MG tablet Take 1 tablet by mouth As Needed for Constipation.     • sildenafil (REVATIO) 20 MG tablet Take 1 tablet by mouth Daily. 10 tablet 2   • vardenafil (LEVITRA) 20 MG tablet TAKE 1 TABLET BY MOUTH AS NEEDED FOR ERECTILE DYSFUNCTION 20 tablet 3     No current facility-administered medications on file prior to visit.       ALLERGIES:     Allergies   Allergen Reactions   • Ciprofloxacin Rash       Social History     Socioeconomic History   • Marital status:      Spouse name: Radha   • Number of children: Not on file   • Years of education: Not on file   • Highest education level: Not on file   Tobacco Use   • Smoking status: Never Smoker   • Smokeless tobacco: Never Used   Substance and Sexual Activity   • Alcohol use: Yes     Alcohol/week: 2.0 standard drinks     Types: 2 Cans of beer per week     Comment: social   • Drug use: No   • Sexual activity: Defer         Cancer-related family history includes Colon cancer in his mother.     Review of Systems   Constitutional: Negative for fatigue.   Respiratory: Negative for chest tightness and shortness of breath.    Gastrointestinal: Negative for abdominal pain, constipation, diarrhea, nausea and vomiting.   Neurological: Negative for weakness.   All other systems reviewed and are negative.    A comprehensive 14 point review of systems was performed and was negative except as mentioned.    Objective      Vitals:    04/26/21 1530   BP: 162/81   Pulse: 55   Resp: 14   Temp: 97.8 °F (36.6 °C)   TempSrc: Infrared   SpO2: 99%   Weight: 69.4 kg (153 lb)   Height: 161.2 cm (63.47\")   PainSc: 0-No pain     Current Status 4/26/2021   ECOG score 0        Physical Exam    GENERAL: Well-developed, well-nourished  male in no acute distress.   SKIN: Warm, dry without rashes, purpura or petechiae.   HEAD: Normocephalic. "   EYES: Pupils equal, round and reactive to light. EOMs intact. Conjunctivae normal.   EARS: Hearing intact.   NOSE: Septum midline. No excoriations or nasal discharge.   MOUTH: Tongue is well-papillated; no stomatitis or ulcers. Lips normal.   THROAT: Oropharynx without lesions or exudates.   NECK: Supple with good range of motion; no thyromegaly or masses, no JVD or bruits.   LYMPHATICS: No cervical, supraclavicular, axillary or inguinal adenopathy.   CHEST: Lungs clear to percussion and auscultation.   CARDIAC: Regular rate and rhythm without murmurs, rubs or gallops.   ABDOMEN: Soft, nontender with no organomegaly or masses.   EXTREMITIES: No clubbing, cyanosis or edema.   NEUROLOGICAL: No focal neurological deficits.    RECENT LABS:  Hematology WBC   Date Value Ref Range Status   04/26/2021 5.67 3.40 - 10.80 10*3/mm3 Final   02/20/2020 3.85 3.40 - 10.80 10*3/mm3 Final     RBC   Date Value Ref Range Status   04/26/2021 4.62 4.14 - 5.80 10*6/mm3 Final   02/20/2020 4.55 4.14 - 5.80 10*6/mm3 Final     Hemoglobin   Date Value Ref Range Status   04/26/2021 13.4 13.0 - 17.7 g/dL Final     Hematocrit   Date Value Ref Range Status   04/26/2021 38.7 37.5 - 51.0 % Final     Platelets   Date Value Ref Range Status   04/26/2021 143 140 - 450 10*3/mm3 Final      XR CHEST 1 VW 12/21/19    FINDINGS: Heart size is borderline. Extensive bilateral infiltrates  appear similar to prior exam. No pleural effusion, or pneumothorax. No  acute osseous process.     IMPRESSION:  No significant change, continued follow-up recommended.         Assessment/Plan       A 60-year-old male with a history of hairy cell leukemia. He presented with leukopenia, thrombocytopenia, and splenomegaly. After diagnosis was confirmed he was treated with cladribine 02/06/2012-02/15/2012 by continuous IV infusion. ALLYSON puentes developed cytopenias, but again not to a serious degree and ultimately exam in late February 2012 was negative for evidence of residual and flow  cytometric assessment. Subsequent physical examination showed resolution of splenomegaly and normalization o f performance status. Followup CT also normalized as well per splenomegaly. The patient has been followed since and when seen in March 2013 and October 2013, was felt to be in CR. He had been seen just after recent viral illness and though he was feeling poorly, symptoms went on to improve and returned to baseline status. At 6 month review, the patient continues in remission. This further vindicates and since last visit, he has had no additional urinary symptoms either.   At this point we find no evidence of recurrent disease and again feel that Mr. Morales remains in complete remission. This has been the case in 03/17/2015 and again is notable 10/13/2015 as well as April 2016, August 2016 and December 2017 .  The patient was last seen December 13, 2018 doing well without evidence recurrence .       The patient, unfortunately, required hospitalization December 19 through December 24 having been admitted with pneumonia and bilateral infiltrates associate with acute respiratory failure.  His studies went on to reveal evidence of metapneumovirus infection and, again, he is felt to have pneumonia with immunocompromise state, treated with broad-spectrum antibiotics and ICU management.  This included IV steroids and additional assessment included an IgG reduced at 497, IgA 104, IgM 31 and IgA 58.  As result he was given IV immunoglobulin at 400 mg/kg.   Fortunately he went on to slowly and steadily improve able to be switched to oral antibiotic therapies, tapering steroids and was able to be discharged home.  Chest x-ray December 23 prior to discharge included lungs now that were expanded with marked improvement in previous extensive bilateral infiltrates, cardiomegaly also noted.  As he and his wife are seen back January 7, 2020 we have discussed that he should be reassessed per hemoglobin levels including a  possible subclass deficiency and be considered for potential prophylaxis with IVIG.  This is not absolute and that he has done well for many years without requiring such infusions but does bear further assessment.     The patient is next seen May 27, 2020 with immunoglobulins remaining relatively stable at above 600 drawn May 18, 2020 for IVIG, at which time subclasses were also acceptable, recheck May 27 with IgG of 614.  At this point supplementation is not necessary that we may review this as the fall approaches.  The patient agreeable to this plan.   The patient is reassessed August 24 2020 with repeat hemoglobin levels that are at the lower end of normal for IgG.  You will not need immunoglobulin at this point.  He and his wife do plan a trip to [place] likely in December.  We will check him prior to that.  The patient is reassessed November 6, 2020 with IgG of 582, IgM of 27 and IgA of 87.  He was doing well we asked him to be reassessed December 15 for his quantitative immunoglobins prior to a stay in [place].  Fortunately these are found to be adequate and will not need to be supplemented.     Patient is reassessed 4/26/2021 doing well without any evidence of progression or recurrence of his hairy cell leukemia and, fortunately, no infectious complications.      Plan:  *Again no IVIG needed at this point    *Follow-up 6 months CBC, RN evaluation    *12 months MD CBC

## 2021-10-21 ENCOUNTER — LAB (OUTPATIENT)
Dept: LAB | Facility: HOSPITAL | Age: 61
End: 2021-10-21

## 2021-10-21 ENCOUNTER — CLINICAL SUPPORT (OUTPATIENT)
Dept: ONCOLOGY | Facility: HOSPITAL | Age: 61
End: 2021-10-21

## 2021-10-21 DIAGNOSIS — D80.1 HYPOGAMMAGLOBULINEMIA (HCC): ICD-10-CM

## 2021-10-21 DIAGNOSIS — C91.41 HAIRY CELL LEUKEMIA, IN REMISSION (HCC): ICD-10-CM

## 2021-10-21 LAB
BASOPHILS # BLD AUTO: 0.02 10*3/MM3 (ref 0–0.2)
BASOPHILS NFR BLD AUTO: 0.4 % (ref 0–1.5)
DEPRECATED RDW RBC AUTO: 38.6 FL (ref 37–54)
EOSINOPHIL # BLD AUTO: 0.04 10*3/MM3 (ref 0–0.4)
EOSINOPHIL NFR BLD AUTO: 0.7 % (ref 0.3–6.2)
ERYTHROCYTE [DISTWIDTH] IN BLOOD BY AUTOMATED COUNT: 12.8 % (ref 12.3–15.4)
HCT VFR BLD AUTO: 41.8 % (ref 37.5–51)
HGB BLD-MCNC: 14.4 G/DL (ref 13–17.7)
IMM GRANULOCYTES # BLD AUTO: 0.09 10*3/MM3 (ref 0–0.05)
IMM GRANULOCYTES NFR BLD AUTO: 1.7 % (ref 0–0.5)
LYMPHOCYTES # BLD AUTO: 1.06 10*3/MM3 (ref 0.7–3.1)
LYMPHOCYTES NFR BLD AUTO: 19.7 % (ref 19.6–45.3)
MCH RBC QN AUTO: 29 PG (ref 26.6–33)
MCHC RBC AUTO-ENTMCNC: 34.4 G/DL (ref 31.5–35.7)
MCV RBC AUTO: 84.3 FL (ref 79–97)
MONOCYTES # BLD AUTO: 0.29 10*3/MM3 (ref 0.1–0.9)
MONOCYTES NFR BLD AUTO: 5.4 % (ref 5–12)
NEUTROPHILS NFR BLD AUTO: 3.88 10*3/MM3 (ref 1.7–7)
NEUTROPHILS NFR BLD AUTO: 72.1 % (ref 42.7–76)
NRBC BLD AUTO-RTO: 0 /100 WBC (ref 0–0.2)
PLATELET # BLD AUTO: 176 10*3/MM3 (ref 140–450)
PMV BLD AUTO: 10.6 FL (ref 6–12)
RBC # BLD AUTO: 4.96 10*6/MM3 (ref 4.14–5.8)
WBC # BLD AUTO: 5.38 10*3/MM3 (ref 3.4–10.8)

## 2021-10-21 PROCEDURE — G0463 HOSPITAL OUTPT CLINIC VISIT: HCPCS

## 2021-10-21 PROCEDURE — 85025 COMPLETE CBC W/AUTO DIFF WBC: CPT

## 2021-10-21 PROCEDURE — 36415 COLL VENOUS BLD VENIPUNCTURE: CPT

## 2021-10-21 NOTE — NURSING NOTE
Pt here for CBC and RN review. CBC reviewed with pt. Counts are stable for this pt at this time. Pt stated he is doing well, no c/o at this time. Copy of labs given to pt. Pt v/u to call with any new concerns.     .  Lab Results   Component Value Date    WBC 5.38 10/21/2021    HGB 14.4 10/21/2021    HCT 41.8 10/21/2021    MCV 84.3 10/21/2021     10/21/2021

## 2022-03-15 ENCOUNTER — LAB (OUTPATIENT)
Dept: LAB | Facility: HOSPITAL | Age: 62
End: 2022-03-15

## 2022-03-15 DIAGNOSIS — C91.41 HAIRY CELL LEUKEMIA, IN REMISSION: Primary | ICD-10-CM

## 2022-03-15 DIAGNOSIS — C91.41 HAIRY CELL LEUKEMIA, IN REMISSION: ICD-10-CM

## 2022-03-15 PROCEDURE — 36415 COLL VENOUS BLD VENIPUNCTURE: CPT

## 2022-03-16 LAB
SARS-COV-2 AB SERPL IA-ACNC: 546.4 U/ML
SARS-COV-2 AB SERPL-IMP: POSITIVE

## 2022-03-17 ENCOUNTER — TELEPHONE (OUTPATIENT)
Dept: ONCOLOGY | Facility: CLINIC | Age: 62
End: 2022-03-17

## 2022-03-17 NOTE — TELEPHONE ENCOUNTER
Per Dr. Morrow, based on pt's COVID antibody testing, he needs Fam. Attempted to call pt, no answer. LVM for him to return call.     Update: pt returned call. Explained Fam to pt and he is interested in receiving. Message sent to scheduling.

## 2022-03-18 ENCOUNTER — TELEPHONE (OUTPATIENT)
Dept: ONCOLOGY | Facility: CLINIC | Age: 62
End: 2022-03-18

## 2022-03-18 PROBLEM — Z92.25 STATUS POST RECENT IMMUNOSUPPRESSIVE THERAPY: Status: ACTIVE | Noted: 2022-03-18

## 2022-03-18 RX ORDER — EPINEPHRINE 1 MG/ML
0.3 INJECTION, SOLUTION INTRAMUSCULAR; SUBCUTANEOUS AS NEEDED
Status: CANCELLED | OUTPATIENT
Start: 2022-03-22

## 2022-03-18 RX ORDER — DIPHENHYDRAMINE HCL 25 MG
50 CAPSULE ORAL ONCE AS NEEDED
Status: CANCELLED | OUTPATIENT
Start: 2022-03-22

## 2022-03-21 ENCOUNTER — INFUSION (OUTPATIENT)
Dept: ONCOLOGY | Facility: HOSPITAL | Age: 62
End: 2022-03-21

## 2022-03-21 VITALS
TEMPERATURE: 97.8 F | OXYGEN SATURATION: 99 % | BODY MASS INDEX: 26.88 KG/M2 | DIASTOLIC BLOOD PRESSURE: 70 MMHG | RESPIRATION RATE: 16 BRPM | WEIGHT: 154 LBS | HEART RATE: 59 BPM | SYSTOLIC BLOOD PRESSURE: 120 MMHG

## 2022-03-21 DIAGNOSIS — Z92.25 STATUS POST RECENT IMMUNOSUPPRESSIVE THERAPY: Primary | ICD-10-CM

## 2022-03-21 PROCEDURE — M0220 HC INJECTION, TIXAGEVIMAB AND CILGAVIMAB: HCPCS | Performed by: NURSE PRACTITIONER

## 2022-03-21 PROCEDURE — 25010000002 INJECTION, TIXAGEVIMAB AND CILGAVIMAB, 300 MG: Performed by: NURSE PRACTITIONER

## 2022-03-21 RX ORDER — DIPHENHYDRAMINE HCL 25 MG
50 CAPSULE ORAL ONCE AS NEEDED
Status: CANCELLED | OUTPATIENT
Start: 2022-03-21

## 2022-03-21 RX ORDER — EPINEPHRINE 1 MG/ML
0.3 INJECTION, SOLUTION, CONCENTRATE INTRAVENOUS AS NEEDED
Status: CANCELLED | OUTPATIENT
Start: 2022-03-21

## 2022-03-21 RX ADMIN — AZD7442 300 MG: KIT at 15:20

## 2022-03-21 NOTE — NURSING NOTE
"Patient presents for Evusheld injections    Patient given copy of Evusheld \"fact sheet for patients\"   Verbal consent for EUA Evusheld injections obtained  Pt denies any symptoms of COVID and denies exposure to individual with COVID   Evusheld administered in 2 separate IM injections   Patients observed for ADR X 1 hour   Patient discharged without complaints           "

## 2022-04-21 ENCOUNTER — OFFICE VISIT (OUTPATIENT)
Dept: ONCOLOGY | Facility: CLINIC | Age: 62
End: 2022-04-21

## 2022-04-21 ENCOUNTER — LAB (OUTPATIENT)
Dept: LAB | Facility: HOSPITAL | Age: 62
End: 2022-04-21

## 2022-04-21 VITALS
OXYGEN SATURATION: 99 % | TEMPERATURE: 97.1 F | HEART RATE: 54 BPM | HEIGHT: 63 IN | WEIGHT: 156.4 LBS | SYSTOLIC BLOOD PRESSURE: 133 MMHG | BODY MASS INDEX: 27.71 KG/M2 | DIASTOLIC BLOOD PRESSURE: 79 MMHG | RESPIRATION RATE: 16 BRPM

## 2022-04-21 DIAGNOSIS — D69.6 THROMBOCYTOPENIA: Primary | ICD-10-CM

## 2022-04-21 DIAGNOSIS — C91.41 HAIRY CELL LEUKEMIA, IN REMISSION: Primary | ICD-10-CM

## 2022-04-21 LAB
ALBUMIN SERPL-MCNC: 4.4 G/DL (ref 3.5–5.2)
ALBUMIN/GLOB SERPL: 2.1 G/DL (ref 1.1–2.4)
ALP SERPL-CCNC: 35 U/L (ref 38–116)
ALT SERPL W P-5'-P-CCNC: 17 U/L (ref 0–41)
ANION GAP SERPL CALCULATED.3IONS-SCNC: 6.9 MMOL/L (ref 5–15)
AST SERPL-CCNC: 18 U/L (ref 0–40)
BASOPHILS # BLD AUTO: 0.01 10*3/MM3 (ref 0–0.2)
BASOPHILS NFR BLD AUTO: 0.2 % (ref 0–1.5)
BILIRUB SERPL-MCNC: 0.4 MG/DL (ref 0.2–1.2)
BUN SERPL-MCNC: 29 MG/DL (ref 6–20)
BUN/CREAT SERPL: 27.4 (ref 7.3–30)
CALCIUM SPEC-SCNC: 9.6 MG/DL (ref 8.5–10.2)
CHLORIDE SERPL-SCNC: 103 MMOL/L (ref 98–107)
CO2 SERPL-SCNC: 28.1 MMOL/L (ref 22–29)
CREAT SERPL-MCNC: 1.06 MG/DL (ref 0.7–1.3)
DEPRECATED RDW RBC AUTO: 40.1 FL (ref 37–54)
EGFRCR SERPLBLD CKD-EPI 2021: 79.4 ML/MIN/1.73
EOSINOPHIL # BLD AUTO: 0.06 10*3/MM3 (ref 0–0.4)
EOSINOPHIL NFR BLD AUTO: 1.2 % (ref 0.3–6.2)
ERYTHROCYTE [DISTWIDTH] IN BLOOD BY AUTOMATED COUNT: 12.6 % (ref 12.3–15.4)
GLOBULIN UR ELPH-MCNC: 2.1 GM/DL (ref 1.8–3.5)
GLUCOSE SERPL-MCNC: 91 MG/DL (ref 74–124)
HCT VFR BLD AUTO: 37.9 % (ref 37.5–51)
HGB BLD-MCNC: 12.9 G/DL (ref 13–17.7)
IMM GRANULOCYTES # BLD AUTO: 0.01 10*3/MM3 (ref 0–0.05)
IMM GRANULOCYTES NFR BLD AUTO: 0.2 % (ref 0–0.5)
LYMPHOCYTES # BLD AUTO: 1 10*3/MM3 (ref 0.7–3.1)
LYMPHOCYTES NFR BLD AUTO: 20.5 % (ref 19.6–45.3)
MCH RBC QN AUTO: 29.9 PG (ref 26.6–33)
MCHC RBC AUTO-ENTMCNC: 34 G/DL (ref 31.5–35.7)
MCV RBC AUTO: 87.7 FL (ref 79–97)
MONOCYTES # BLD AUTO: 0.17 10*3/MM3 (ref 0.1–0.9)
MONOCYTES NFR BLD AUTO: 3.5 % (ref 5–12)
NEUTROPHILS NFR BLD AUTO: 3.63 10*3/MM3 (ref 1.7–7)
NEUTROPHILS NFR BLD AUTO: 74.4 % (ref 42.7–76)
NRBC BLD AUTO-RTO: 0 /100 WBC (ref 0–0.2)
PLATELET # BLD AUTO: 148 10*3/MM3 (ref 140–450)
PMV BLD AUTO: 10.1 FL (ref 6–12)
POTASSIUM SERPL-SCNC: 5.1 MMOL/L (ref 3.5–4.7)
PROT SERPL-MCNC: 6.5 G/DL (ref 6.3–8)
RBC # BLD AUTO: 4.32 10*6/MM3 (ref 4.14–5.8)
SODIUM SERPL-SCNC: 138 MMOL/L (ref 134–145)
WBC NRBC COR # BLD: 4.88 10*3/MM3 (ref 3.4–10.8)

## 2022-04-21 PROCEDURE — 36415 COLL VENOUS BLD VENIPUNCTURE: CPT

## 2022-04-21 PROCEDURE — 99214 OFFICE O/P EST MOD 30 MIN: CPT | Performed by: INTERNAL MEDICINE

## 2022-04-21 PROCEDURE — 85025 COMPLETE CBC W/AUTO DIFF WBC: CPT

## 2022-04-21 PROCEDURE — 80053 COMPREHEN METABOLIC PANEL: CPT

## 2022-04-21 RX ORDER — ACETAZOLAMIDE 125 MG/1
TABLET ORAL
COMMUNITY

## 2022-04-21 RX ORDER — LISINOPRIL 20 MG/1
TABLET ORAL
COMMUNITY
Start: 2022-04-19

## 2022-04-21 NOTE — PROGRESS NOTES
Subjective Patient doing well, no infectious complications                                         REASONS FOR FOLLOWUP:    1. Presentation with thrombocytopenia, mild leukopenia and splenomegaly.   2. Diagnosis hairy cell leukemia.   3. Status post hospitalization 02/06/2012-02/15/2012 per treatment with cladribine (2CdA x 7 days continuous IV infusion).   4. Evidence of CR noted 05/09/2012 with interval resolution of splenomegaly, notable on physical exam and peripheral blood smear.   5. Patien t with continued CR noted 09/05/2012, every 3 month assessment to 1 year anniversary planned, every 4 months 2nd year, every 6 months years 3-5.   6. Patient seen 03/05/2013 stable, with plans to move to every 4 month assessment. Patient proceeding through t reatment with Suboxone.   7. The patient was seen on 10/25/2013, stable hematologically and reassessment q.6 months planned.   8. The patient was seen 04/10/2014, status post recent apparent viral illness with normalization of peripheral blood counts and symptoms.   9. Patient seen 09/26/2014, stable - continued remission noted.   10. Patient seen on 03/17/2015, stable in continued remission, every 6 month assessment planned.   11. Patient seen every 6 months to yearly without evidence recurrence-reviewed December 2018  12. Hospitalization December 19-December 24-acute respiratory failure with hypoxia, metapneumovirus  13. Patient assessed May 27, 2020, no additional respiratory issues.  Plans made to reassess immunoglobulin levels and supplement as needed  14. Patient reassessed August 2020 with acceptable immunoglobulin levels.  15. Patient assessed November 16, 2020, stable, reexamination prior to [place] stay in December 2020  16. Patient assessed December 21, 2020, acceptable immunoglobulin levels, [facility] x2 months planned  17. Patient reassessed 4/26/2021 with no infectious complications.  18. Patient reviewed 4/21/2022 again without infectious  complications, status post Evusheld                         History of Present Illness         The patient is a 5-year-old male who has been previously healthy. He had had some routine blood work performed recently with Dr. Sinclair on 01/17/2012 showing significant thrombocytopenia with a platelet count of 79,000. His white count was low normal at 4700 with decreased granulocytes at 31% with lymphocytes elevated at 55%. His serum chemistries at the time were unremarkable including normal liver function tests. Mr. Morales had been feeling reasonably w ell although he reported some fatigue. He has had no fevers, chills, night sweats or unexplained weight loss. He has been on a weight loss program and following a low carb diet and has intentionally lost about 40 pounds in the last year or two. He had noted some easier bruising but had been taking aspirin and fish oil even prior to noticing his platelet count was low.   On his examination in the office 01/31/2012 the patient had significant splenomegaly with spleen tip palpable approximately 7-8 cm below the left costal margin. The spleen is not particularly tender and did not extend to midline. Peripheral smear showed large platelets and atypical lymphocytes with no blasts. Mr. Morales was assessed by flow cytometry peripheral blood showing androgeni c profile consistent with hairy cell leukemia. Bone marrow aspirate and biopsy also was confirmatory for hairy cell leukemia with normal chromosomal complement. 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 Additionally CT abdomen and pelvis demonstrated splenomegaly with spleen measuring 22 cm in craniocaudal extent up to 16.7 x 8 in the axial plane. No other abnormalities were present. After discussion the patient was advised per linda osborn with 2CdA. He was admitted 02/06/2015-02/15/2012 with a dual lumen PICC line placed. 2CdA began after initial  hydration and continued over the subsequent 7 days by continuous IV infusion. He did relatively well except for weakness and fatigue. He ha d additional issues with constipation and exacerbation of chronic low back pain. He further had development of neutropenia 02/12/2012 and was placed on Diflucan, Valtrex and Levaquin. These he also tolerated well. He also required transfusion just prior to discharge. Finally the patient was asked to return 02/16/2012 for Neulasta which he did return back to take in the office. He subsequently has done relatively well as an outpatient with white count increasing to 13,000 by 02/20/2012 and peaking at 20 , 000 by 02/24/2012. Platelet count also improved substantially peaking 02/23/2012 at 177,000. He has otherwise returned and we were able to remove his PICC line by 02/24/2012 and on 02/27/2012 continued resolution of his leukopenia, antiviral and antibac t erial medication discontinued. The patient now returns today feeling weak in the morning but otherwise doing well for the rest of the day without any fevers, chills, and resolution of night sweats. Appetite, weight and performance status have remained o verall excellent. He has an episode of gouty arthropathy involving his left great toe. He used Indocin briefly and then went back to allopurinol but is now having his gout return this morning.   The patient thereafter did use Indocin successful and thereafter restarted allopurinol. His gouty arthropathy responded appropriately and has not returned. Followup counts were requested and as he returns today Mr. Morales is feeling well. He has returne d to exercise and nearly to his full-time job. He still notes some degree of fatigue in the a.m. and by the early afternoon approximately 12:30-1:00 p.m. He is at work full-time. He has had no fever, chills, weight loss, night sweats, nausea or vomiting, c hange in bowel habit.   The patient was asked to return back for followup  reassessment. CT scan 05/02/2012 per splenomegaly showed spleen to have regressed substantially in size. The AP in 1st dimension previously 16.7 x 8 is now 12.2 by 6.1. No other abno rmalities were present. Peripheral smear also shows no evidence of recurrent disease. The patient feels well and has returned to normal activity.   The patient was asked to return for followup now seen 09/05/2012 doing well with normal activity again with considerable improvement in his general performance status.   The patient is now seen 03/05/2013 continuing to do well. It came to the attention however through a [facility] report that he is current on Suboxone through Dr. Kalia Davidson. In discussion with the patient he evidently had difficulty in trying to discontinue pain medications in the spring of 2012. He eventually saw Dr. Davidson who was hoping to wean him altogether from pain medications with the use of Suboxone. 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 He does this primarily through diet at present.   The patient thereafter was asked to return in followup. He is now seen on 10/25/2013 feeling well overall, continuing with exercise and dietary program. He feels well overall except for mild headache, approximately at 3:00-4:00 p.m. each day(?).   The patient thereafter was asked to be seen back 6 months later . He is now seen 09/26/2014 doing well continuing his exercise and dietary program to terrific effect. He has had no issues since last seen and in fact has normalized his hypertension as a result of his dietary program.   The patient thereafter was asked to be seen 6 months later. He continues to do his exercise and dietary program to wonderful effect. He has had no additional issues since last seen.   The patient is now seen back 6 months later, 10/13/2015, and fortunately does  continue to do amazingly well. He has maintained his exercise and dietary program, again to excellent and continued significant effect.   Patient now reviewed August 04, 2016.  He continues to feel well except for periodic fatigue.    The patient is reviewed December 04, 2017.  Hematologically he is stable and remains in remission.  Plans are to eventually discontinue Suboxone as well.    Patient is next seen December 13, 2018.  He continues to exercise regularly and feels generally good though has been concerned about possibly splenic discomfort.  He also describes that his mother is been diagnosed with neuroendocrine carcinoma involving the bowel and is undergoing treatment up to and including immunotherapy.    The patient, unfortunately, required hospitalization December 19 through December 24 having been admitted with pneumonia and bilateral infiltrates associate with acute respiratory failure.  His studies went on to reveal evidence of metapneumovirus infection and, again, he is felt to have pneumonia with immunocompromise state, treated with broad-spectrum antibiotics and ICU management.  This included IV steroids and additional assessment included an IgG reduced at 497, IgA 104, IgM 31 and IgA 58.  As result he was given IV immunoglobulin at 400 mg/kg.   Fortunately he went on to slowly and steadily improve able to be switched to oral antibiotic therapies, tapering steroids and was able to be discharged home.  Chest x-ray December 23 prior to discharge included lungs now that were expanded with marked improvement in previous extensive bilateral infiltrates, cardiomegaly also noted.  Patient is next seen January 17, 2020 markedly improved and nearly back to normal activity.  We discussed immunoglobulin reassessment and possibly prophylaxis.  The patient is next seen May 27, 2020 and continues to feel well.  He has had no additional infectious complications and peripheral smear is normal when seen in office  today.  At the time of this dictation his immunoglobulins have returned as consistent with IgA of 85, IgG of 614 and IgM of 26.  At this point additional supplementation is not necessary but may become important as we move into the fall.    We had the patient return for follow-up assessing him for immunoglobulin levels August 17 with IgG of 590, IgA of 90, IgM 27, no monoclonality.  IV immunoglobulin was nonapproved as result.    He is now contacted by telephone and he is feeling well. We discussed his current immunnoglobulin levels that do not warrant prophylactic IVIG at this point.  We would want to assess this in November just prior to a planned 2-month stay in [place].     The patient is next seen November 16, 2020 doing well though he now plans his trip to [place] in December.  We discussed reassessment prior to that trip.  Patient returns for repeat laboratory studies with quantitative hemoglobins obtained December 14 include an IgA of 78, IgG 567 and IgM of 31.  These are adequate and will not require infusions prior to stay in [place].  Fortunately he is feeling well and we discussed that he would be a candidate for COVID-19 vaccines as they become available.  The patient went on to be vaccinated and thereafter wintered in [place] for approximately 2 months. He is next reassessed 4/26/2021 having had no infectious complications and with a normal peripheral blood smear.    After discussion the patient was given Evusheld 3/21/2022.  He did well with this injection of his neck seen back 4/21/2022 continue to feel reasonably well but concerned about his general pulmonary reserve as he tries to continue running.  He had been released by pulmonary medicine after his previous viral pneumonitis several years ago.    Past Medical History:   Diagnosis Date   • Asthma    • Cancer (HCC)     hairy cell leukemia   • Coronary artery disease     minimal with some minimal narrowing of 1 vessel   • ED (erectile dysfunction)  of organic origin    • H/O vitamin D deficiency    • Hypertension    • Night sweats    • Pneumonia    • Thrombocytopenia (HCC)        ONCOLOGIC HISTORY:  (History from previous dates can be found in the separate document.)    Current Outpatient Medications on File Prior to Visit   Medication Sig Dispense Refill   • acetaZOLAMIDE (DIAMOX) 125 MG tablet acetazolamide 125 mg tablet   TAKE 1 TABLET BY MOUTH TWICE DAILY. START 24 HOURS BEFORE TRIP. STOP AFTER 3 DAYS.     • albuterol sulfate  (90 Base) MCG/ACT inhaler Inhale 2 puffs Every 4 (Four) Hours As Needed for Wheezing. 1 inhaler 3   • buprenorphine-naloxone (SUBOXONE) 8-2 MG per SL tablet TAKE 2 TABLETS UNDER TONGUE EVERY MORNING CS EXPRESS SC HL  0   • lisinopril (PRINIVIL,ZESTRIL) 20 MG tablet      • senna (SENOKOT) 8.6 MG tablet Take 1 tablet by mouth As Needed for Constipation.     • sildenafil (REVATIO) 20 MG tablet Take 1 tablet by mouth Daily. 10 tablet 2   • vardenafil (LEVITRA) 20 MG tablet TAKE 1 TABLET BY MOUTH AS NEEDED FOR ERECTILE DYSFUNCTION 20 tablet 3     No current facility-administered medications on file prior to visit.       ALLERGIES:     Allergies   Allergen Reactions   • Ciprofloxacin Rash       Social History     Socioeconomic History   • Marital status:      Spouse name: Radha   Tobacco Use   • Smoking status: Never Smoker   • Smokeless tobacco: Never Used   Substance and Sexual Activity   • Alcohol use: Yes     Alcohol/week: 2.0 standard drinks     Types: 2 Cans of beer per week     Comment: social   • Drug use: No   • Sexual activity: Defer         Cancer-related family history includes Colon cancer in his mother.     Review of Systems   Constitutional: Negative for fatigue.   Respiratory: Negative for chest tightness and shortness of breath.         Reduced pulmonary reserve?   Gastrointestinal: Negative for abdominal pain, constipation, diarrhea, nausea and vomiting.   Neurological: Negative for weakness.   All other  "systems reviewed and are negative.    A comprehensive 14 point review of systems was performed and was negative except as mentioned.    Objective      Vitals:    04/21/22 1340   BP: 133/79   Pulse: 54   Resp: 16   Temp: 97.1 °F (36.2 °C)   TempSrc: Temporal   SpO2: 99%   Weight: 70.9 kg (156 lb 6.4 oz)   Height: 161.2 cm (63.47\")   PainSc: 0-No pain     Current Status 4/21/2022   ECOG score 0        Physical Exam    GENERAL: Well-developed, well-nourished  male in no acute distress.   SKIN: Warm, dry without rashes, purpura or petechiae.   HEAD: Normocephalic.   EYES: Pupils equal, round and reactive to light. EOMs intact. Conjunctivae normal.   EARS: Hearing intact.   NOSE: Septum midline. No excoriations or nasal discharge.   MOUTH: Tongue is well-papillated; no stomatitis or ulcers. Lips normal.   THROAT: Oropharynx without lesions or exudates.   NECK: Supple with good range of motion; no thyromegaly or masses, no JVD or bruits.   LYMPHATICS: No cervical, supraclavicular, axillary or inguinal adenopathy.   CHEST: Lungs clear to percussion and auscultation.   CARDIAC: Regular rate and rhythm without murmurs, rubs or gallops.   ABDOMEN: Soft, nontender with no organomegaly or masses.   EXTREMITIES: No clubbing, cyanosis or edema.   NEUROLOGICAL: No focal neurological deficits.    RECENT LABS:  Hematology WBC   Date Value Ref Range Status   04/21/2022 4.88 3.40 - 10.80 10*3/mm3 Final   02/20/2020 3.85 3.40 - 10.80 10*3/mm3 Final     RBC   Date Value Ref Range Status   04/21/2022 4.32 4.14 - 5.80 10*6/mm3 Final   02/20/2020 4.55 4.14 - 5.80 10*6/mm3 Final     Hemoglobin   Date Value Ref Range Status   04/21/2022 12.9 (L) 13.0 - 17.7 g/dL Final     Hematocrit   Date Value Ref Range Status   04/21/2022 37.9 37.5 - 51.0 % Final     Platelets   Date Value Ref Range Status   04/21/2022 148 140 - 450 10*3/mm3 Final      XR CHEST 1 VW 12/21/19    FINDINGS: Heart size is borderline. Extensive bilateral " infiltrates  appear similar to prior exam. No pleural effusion, or pneumothorax. No  acute osseous process.     IMPRESSION:  No significant change, continued follow-up recommended.         Assessment/Plan       A 61-year-old male with a history of hairy cell leukemia. He presented with leukopenia, thrombocytopenia, and splenomegaly. After diagnosis was confirmed he was treated with cladribine 02/06/2012-02/15/2012 by continuous IV infusion. ALLYSON puentes developed cytopenias, but again not to a serious degree and ultimately exam in late February 2012 was negative for evidence of residual and flow cytometric assessment. Subsequent physical examination showed resolution of splenomegaly and normalization o f performance status. Followup CT also normalized as well per splenomegaly. The patient has been followed since and when seen in March 2013 and October 2013, was felt to be in CR. He had been seen just after recent viral illness and though he was feeling poorly, symptoms went on to improve and returned to baseline status. At 6 month review, the patient continues in remission. This further vindicates and since last visit, he has had no additional urinary symptoms either.   At this point we find no evidence of recurrent disease and again feel that Mr. Morales remains in complete remission. This has been the case in 03/17/2015 and again is notable 10/13/2015 as well as April 2016, August 2016 and December 2017 .  The patient was last seen December 13, 2018 doing well without evidence recurrence .       The patient, unfortunately, required hospitalization December 19 through December 24 having been admitted with pneumonia and bilateral infiltrates associate with acute respiratory failure.  His studies went on to reveal evidence of metapneumovirus infection and, again, he is felt to have pneumonia with immunocompromise state, treated with broad-spectrum antibiotics and ICU management.  This included IV steroids and additional  assessment included an IgG reduced at 497, IgA 104, IgM 31 and IgA 58.  As result he was given IV immunoglobulin at 400 mg/kg.   Fortunately he went on to slowly and steadily improve able to be switched to oral antibiotic therapies, tapering steroids and was able to be discharged home.  Chest x-ray December 23 prior to discharge included lungs now that were expanded with marked improvement in previous extensive bilateral infiltrates, cardiomegaly also noted.  As he and his wife are seen back January 7, 2020 we have discussed that he should be reassessed per hemoglobin levels including a possible subclass deficiency and be considered for potential prophylaxis with IVIG.  This is not absolute and that he has done well for many years without requiring such infusions but does bear further assessment.     The patient is next seen May 27, 2020 with immunoglobulins remaining relatively stable at above 600 drawn May 18, 2020 for IVIG, at which time subclasses were also acceptable, recheck May 27 with IgG of 614.  At this point supplementation is not necessary that we may review this as the fall approaches.  The patient agreeable to this plan.   The patient is reassessed August 24 2020 with repeat hemoglobin levels that are at the lower end of normal for IgG.  You will not need immunoglobulin at this point.  He and his wife do plan a trip to [place] likely in December.  We will check him prior to that.  The patient is reassessed November 6, 2020 with IgG of 582, IgM of 27 and IgA of 87.  He was doing well we asked him to be reassessed December 15 for his quantitative immunoglobins prior to a stay in [place].  Fortunately these are found to be adequate and will not need to be supplemented.     Patient is reassessed 4/26/2021 doing well without any evidence of progression or recurrence of his hairy cell leukemia and, fortunately, no infectious complications.  This is again the case and the patient was given Evusheld 3/21/2022.   As he is seen 4/21/2022 his general performance status is excellent we discussed follow-up including pulmonary review as needed.      Plan:  *Again no IVIG needed at this point    *Follow-up 6 months CBC, RN evaluation, draw SARS-CoV-2 semiquantitative antibodies, RUBÉN, PE, free serum light chains    *12 months MD CBC, chest x-ray

## 2022-10-25 ENCOUNTER — LAB (OUTPATIENT)
Dept: LAB | Facility: HOSPITAL | Age: 62
End: 2022-10-25

## 2022-10-25 ENCOUNTER — CLINICAL SUPPORT (OUTPATIENT)
Dept: ONCOLOGY | Facility: HOSPITAL | Age: 62
End: 2022-10-25

## 2022-10-25 DIAGNOSIS — C91.41 HAIRY CELL LEUKEMIA, IN REMISSION: ICD-10-CM

## 2022-10-25 LAB
BASOPHILS # BLD AUTO: 0.02 10*3/MM3 (ref 0–0.2)
BASOPHILS NFR BLD AUTO: 0.4 % (ref 0–1.5)
DEPRECATED RDW RBC AUTO: 41 FL (ref 37–54)
EOSINOPHIL # BLD AUTO: 0.05 10*3/MM3 (ref 0–0.4)
EOSINOPHIL NFR BLD AUTO: 1.1 % (ref 0.3–6.2)
ERYTHROCYTE [DISTWIDTH] IN BLOOD BY AUTOMATED COUNT: 12.7 % (ref 12.3–15.4)
HCT VFR BLD AUTO: 39.7 % (ref 37.5–51)
HGB BLD-MCNC: 13.3 G/DL (ref 13–17.7)
IMM GRANULOCYTES # BLD AUTO: 0 10*3/MM3 (ref 0–0.05)
IMM GRANULOCYTES NFR BLD AUTO: 0 % (ref 0–0.5)
LYMPHOCYTES # BLD AUTO: 0.9 10*3/MM3 (ref 0.7–3.1)
LYMPHOCYTES NFR BLD AUTO: 19.2 % (ref 19.6–45.3)
MCH RBC QN AUTO: 29.5 PG (ref 26.6–33)
MCHC RBC AUTO-ENTMCNC: 33.5 G/DL (ref 31.5–35.7)
MCV RBC AUTO: 88 FL (ref 79–97)
MONOCYTES # BLD AUTO: 0.17 10*3/MM3 (ref 0.1–0.9)
MONOCYTES NFR BLD AUTO: 3.6 % (ref 5–12)
NEUTROPHILS NFR BLD AUTO: 3.55 10*3/MM3 (ref 1.7–7)
NEUTROPHILS NFR BLD AUTO: 75.7 % (ref 42.7–76)
NRBC BLD AUTO-RTO: 0 /100 WBC (ref 0–0.2)
PLATELET # BLD AUTO: 146 10*3/MM3 (ref 140–450)
PMV BLD AUTO: 9.5 FL (ref 6–12)
RBC # BLD AUTO: 4.51 10*6/MM3 (ref 4.14–5.8)
WBC NRBC COR # BLD: 4.69 10*3/MM3 (ref 3.4–10.8)

## 2022-10-25 PROCEDURE — G0463 HOSPITAL OUTPT CLINIC VISIT: HCPCS

## 2022-10-25 PROCEDURE — 85025 COMPLETE CBC W/AUTO DIFF WBC: CPT

## 2022-10-25 PROCEDURE — 36415 COLL VENOUS BLD VENIPUNCTURE: CPT

## 2022-10-25 NOTE — NURSING NOTE
Lab Results   Component Value Date    WBC 4.69 10/25/2022    HGB 13.3 10/25/2022    HCT 39.7 10/25/2022    MCV 88.0 10/25/2022     10/25/2022     Patient is here for lab review with RN.  CBC reviewed, counts are stable for this patient at this time. Patient has no complaints. Copy of labs given to patient and follow up appointment reviewed. Patient is instructed to call the office with any concerns or new symptoms prior to next visit. Patient verbalized understanding and discharged in stable condition.

## 2022-10-26 ENCOUNTER — TELEPHONE (OUTPATIENT)
Dept: ONCOLOGY | Facility: CLINIC | Age: 62
End: 2022-10-26

## 2022-10-26 LAB
ALBUMIN SERPL ELPH-MCNC: 4.1 G/DL (ref 2.9–4.4)
ALBUMIN/GLOB SERPL: 1.8 {RATIO} (ref 0.7–1.7)
ALPHA1 GLOB SERPL ELPH-MCNC: 0.2 G/DL (ref 0–0.4)
ALPHA2 GLOB SERPL ELPH-MCNC: 0.6 G/DL (ref 0.4–1)
B-GLOBULIN SERPL ELPH-MCNC: 0.9 G/DL (ref 0.7–1.3)
GAMMA GLOB SERPL ELPH-MCNC: 0.6 G/DL (ref 0.4–1.8)
GLOBULIN SER-MCNC: 2.3 G/DL (ref 2.2–3.9)
IGA SERPL-MCNC: 95 MG/DL (ref 61–437)
IGG SERPL-MCNC: 586 MG/DL (ref 603–1613)
IGM SERPL-MCNC: 36 MG/DL (ref 20–172)
INTERPRETATION SERPL IEP-IMP: ABNORMAL
KAPPA LC FREE SER-MCNC: 11.5 MG/L (ref 3.3–19.4)
KAPPA LC FREE/LAMBDA FREE SER: 0.94 {RATIO} (ref 0.26–1.65)
LABORATORY COMMENT REPORT: ABNORMAL
LAMBDA LC FREE SERPL-MCNC: 12.2 MG/L (ref 5.7–26.3)
M PROTEIN SERPL ELPH-MCNC: ABNORMAL G/DL
PROT SERPL-MCNC: 6.4 G/DL (ref 6–8.5)
SARS-COV-2 AB SERPL IA-ACNC: 209 U/ML
SARS-COV-2 AB SERPL-IMP: POSITIVE

## 2022-10-26 RX ORDER — DIPHENHYDRAMINE HCL 25 MG
50 CAPSULE ORAL ONCE AS NEEDED
Status: CANCELLED | OUTPATIENT
Start: 2022-12-01

## 2022-10-26 RX ORDER — EPINEPHRINE 1 MG/ML
0.3 INJECTION, SOLUTION INTRAMUSCULAR; SUBCUTANEOUS AS NEEDED
Status: CANCELLED | OUTPATIENT
Start: 2022-12-01

## 2022-10-26 NOTE — TELEPHONE ENCOUNTER
Called pt and informed him that per Dr. Morrow, his COVID antibody levels were low and he needs Evusheld again. Pt v/u. Message sent to scheduling and new plan placed.

## 2022-11-03 ENCOUNTER — APPOINTMENT (OUTPATIENT)
Dept: ONCOLOGY | Facility: HOSPITAL | Age: 62
End: 2022-11-03

## 2022-12-01 ENCOUNTER — INFUSION (OUTPATIENT)
Dept: ONCOLOGY | Facility: HOSPITAL | Age: 62
End: 2022-12-01

## 2022-12-01 VITALS
TEMPERATURE: 98.2 F | WEIGHT: 153.8 LBS | SYSTOLIC BLOOD PRESSURE: 128 MMHG | RESPIRATION RATE: 16 BRPM | HEART RATE: 53 BPM | OXYGEN SATURATION: 98 % | BODY MASS INDEX: 26.85 KG/M2 | DIASTOLIC BLOOD PRESSURE: 78 MMHG

## 2022-12-01 DIAGNOSIS — Z92.25 STATUS POST RECENT IMMUNOSUPPRESSIVE THERAPY: Primary | ICD-10-CM

## 2022-12-01 PROCEDURE — M0220 HC INJECTION, TIXAGEVIMAB AND CILGAVIMAB: HCPCS | Performed by: INTERNAL MEDICINE

## 2022-12-01 PROCEDURE — 25010000002 INJECTION, TIXAGEVIMAB AND CILGAVIMAB,: Performed by: INTERNAL MEDICINE

## 2022-12-01 RX ORDER — EPINEPHRINE 1 MG/ML
0.3 INJECTION, SOLUTION, CONCENTRATE INTRAVENOUS AS NEEDED
OUTPATIENT
Start: 2022-12-01

## 2022-12-01 RX ORDER — DIPHENHYDRAMINE HCL 25 MG
50 CAPSULE ORAL ONCE AS NEEDED
OUTPATIENT
Start: 2022-12-01

## 2022-12-01 RX ADMIN — AZD7442 300 MG: KIT at 15:10

## 2022-12-01 NOTE — NURSING NOTE
"Patient presents for Evusheld injections    Patient given copy of Evusheld \"fact sheet for patients\"   Verbal consent for EUA Evusheld injections obtained  Pt denies any symptoms of COVID and denies exposure to individual with COVID   Evusheld administered in 2 separate IM injections   Patients observed for ADR X 30 minutes  Patient discharged without complaints          "

## 2023-05-03 NOTE — PROGRESS NOTES
Subjective Patient again doing well, no infectious complications                                                                         REASONS FOR FOLLOWUP:    1. Presentation with thrombocytopenia, mild leukopenia and splenomegaly.   2. Diagnosis hairy cell leukemia.   3. Status post hospitalization 02/06/2012-02/15/2012 per treatment with cladribine (2CdA x 7 days continuous IV infusion).   4. Evidence of CR noted 05/09/2012 with interval resolution of splenomegaly, notable on physical exam and peripheral blood smear.   5. Patien t with continued CR noted 09/05/2012, every 3 month assessment to 1 year anniversary planned, every 4 months 2nd year, every 6 months years 3-5.   6. Patient seen 03/05/2013 stable, with plans to move to every 4 month assessment. Patient proceeding through t reatment with Suboxone.   7. The patient was seen on 10/25/2013, stable hematologically and reassessment q.6 months planned.   8. The patient was seen 04/10/2014, status post recent apparent viral illness with normalization of peripheral blood counts and symptoms.   9. Patient seen 09/26/2014, stable - continued remission noted.   10. Patient seen on 03/17/2015, stable in continued remission, every 6 month assessment planned.   11. Patient seen every 6 months to yearly without evidence recurrence-reviewed December 2018  12. Hospitalization December 19-December 24-acute respiratory failure with hypoxia, metapneumovirus  13. Patient assessed May 27, 2020, no additional respiratory issues.  Plans made to reassess immunoglobulin levels and supplement as needed  14. Patient reassessed August 2020 with acceptable immunoglobulin levels.  15. Patient assessed November 16, 2020, stable, reexamination prior to [place] stay in December 2020  16. Patient assessed December 21, 2020, acceptable immunoglobulin levels, [facility] x2 months planned  17. Patient reassessed 4/26/2021 with no infectious complications.  18. Patient reviewed  4/21/2022 again without infectious complications, status post Evusheld  19. Patient seen 5/4/2023, no infectious complications, patient urged to follow-up bivalent COVID-19 vaccination                         History of Present Illness         The patient is a 62-year-old male who has been previously healthy. He had had some routine blood work performed recently with Dr. Sinclair on 01/17/2012 showing significant thrombocytopenia with a platelet count of 79,000. His white count was low normal at 4700 with decreased granulocytes at 31% with lymphocytes elevated at 55%. His serum chemistries at the time were unremarkable including normal liver function tests. Mr. Morales had been feeling reasonably w ell although he reported some fatigue. He has had no fevers, chills, night sweats or unexplained weight loss. He has been on a weight loss program and following a low carb diet and has intentionally lost about 40 pounds in the last year or two. He had noted some easier bruising but had been taking aspirin and fish oil even prior to noticing his platelet count was low.   On his examination in the office 01/31/2012 the patient had significant splenomegaly with spleen tip palpable approximately 7-8 cm below the left costal margin. The spleen is not particularly tender and did not extend to midline. Peripheral smear showed large platelets and atypical lymphocytes with no blasts. Mr. Morales was assessed by flow cytometry peripheral blood showing androgeni c profile consistent with hairy cell leukemia. Bone marrow aspirate and biopsy also was confirmatory for hairy cell leukemia with normal chromosomal complement. 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 Additionally CT abdomen and pelvis demonstrated splenomegaly with spleen measuring 22 cm in craniocaudal extent up to 16.7 x 8 in the axial plane. No other abnormalities were present. After discussion  the patient was advised per linda osborn with 2CdA. He was admitted 02/06/2015-02/15/2012 with a dual lumen PICC line placed. 2CdA began after initial hydration and continued over the subsequent 7 days by continuous IV infusion. He did relatively well except for weakness and fatigue. He ha d additional issues with constipation and exacerbation of chronic low back pain. He further had development of neutropenia 02/12/2012 and was placed on Diflucan, Valtrex and Levaquin. These he also tolerated well. He also required transfusion just prior to discharge. Finally the patient was asked to return 02/16/2012 for Neulasta which he did return back to take in the office. He subsequently has done relatively well as an outpatient with white count increasing to 13,000 by 02/20/2012 and peaking at 20 , 000 by 02/24/2012. Platelet count also improved substantially peaking 02/23/2012 at 177,000. He has otherwise returned and we were able to remove his PICC line by 02/24/2012 and on 02/27/2012 continued resolution of his leukopenia, antiviral and antibac t erial medication discontinued. The patient now returns today feeling weak in the morning but otherwise doing well for the rest of the day without any fevers, chills, and resolution of night sweats. Appetite, weight and performance status have remained o verall excellent. He has an episode of gouty arthropathy involving his left great toe. He used Indocin briefly and then went back to allopurinol but is now having his gout return this morning.   The patient thereafter did use Indocin successful and thereafter restarted allopurinol. His gouty arthropathy responded appropriately and has not returned. Followup counts were requested and as he returns today Mr. Morales is feeling well. He has returne d to exercise and nearly to his full-time job. He still notes some degree of fatigue in the a.m. and by the early afternoon approximately 12:30-1:00 p.m. He is at work full-time. He has  had no fever, chills, weight loss, night sweats, nausea or vomiting, c hange in bowel habit.   The patient was asked to return back for followup reassessment. CT scan 05/02/2012 per splenomegaly showed spleen to have regressed substantially in size. The AP in 1st dimension previously 16.7 x 8 is now 12.2 by 6.1. No other abno rmalities were present. Peripheral smear also shows no evidence of recurrent disease. The patient feels well and has returned to normal activity.   The patient was asked to return for followup now seen 09/05/2012 doing well with normal activity again with considerable improvement in his general performance status.   The patient is now seen 03/05/2013 continuing to do well. It came to the attention however through a [facility] report that he is current on Suboxone through Dr. Kalia Davidson. In discussion with the patient he evidently had difficulty in trying to discontinue pain medications in the spring of 2012. He eventually saw Dr. Davidson who was hoping to wean him altogether from pain medications with the use of Suboxone. 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 He does this primarily through diet at present.   The patient thereafter was asked to return in followup. He is now seen on 10/25/2013 feeling well overall, continuing with exercise and dietary program. He feels well overall except for mild headache, approximately at 3:00-4:00 p.m. each day(?).   The patient thereafter was asked to be seen back 6 months later . He is now seen 09/26/2014 doing well continuing his exercise and dietary program to terrific effect. He has had no issues since last seen and in fact has normalized his hypertension as a result of his dietary program.   The patient thereafter was asked to be seen 6 months later. He continues to do his exercise and dietary program to  wonderful effect. He has had no additional issues since last seen.   The patient is now seen back 6 months later, 10/13/2015, and fortunately does continue to do amazingly well. He has maintained his exercise and dietary program, again to excellent and continued significant effect.   Patient now reviewed August 04, 2016.  He continues to feel well except for periodic fatigue.    The patient is reviewed December 04, 2017.  Hematologically he is stable and remains in remission.  Plans are to eventually discontinue Suboxone as well.    Patient is next seen December 13, 2018.  He continues to exercise regularly and feels generally good though has been concerned about possibly splenic discomfort.  He also describes that his mother is been diagnosed with neuroendocrine carcinoma involving the bowel and is undergoing treatment up to and including immunotherapy.    The patient, unfortunately, required hospitalization December 19 through December 24 having been admitted with pneumonia and bilateral infiltrates associate with acute respiratory failure.  His studies went on to reveal evidence of metapneumovirus infection and, again, he is felt to have pneumonia with immunocompromise state, treated with broad-spectrum antibiotics and ICU management.  This included IV steroids and additional assessment included an IgG reduced at 497, IgA 104, IgM 31 and IgA 58.  As result he was given IV immunoglobulin at 400 mg/kg.   Fortunately he went on to slowly and steadily improve able to be switched to oral antibiotic therapies, tapering steroids and was able to be discharged home.  Chest x-ray December 23 prior to discharge included lungs now that were expanded with marked improvement in previous extensive bilateral infiltrates, cardiomegaly also noted.  Patient is next seen January 17, 2020 markedly improved and nearly back to normal activity.  We discussed immunoglobulin reassessment and possibly prophylaxis.  The patient is next seen  May 27, 2020 and continues to feel well.  He has had no additional infectious complications and peripheral smear is normal when seen in office today.  At the time of this dictation his immunoglobulins have returned as consistent with IgA of 85, IgG of 614 and IgM of 26.  At this point additional supplementation is not necessary but may become important as we move into the fall.    We had the patient return for follow-up assessing him for immunoglobulin levels August 17 with IgG of 590, IgA of 90, IgM 27, no monoclonality.  IV immunoglobulin was nonapproved as result.    He is now contacted by telephone and he is feeling well. We discussed his current immunnoglobulin levels that do not warrant prophylactic IVIG at this point.  We would want to assess this in November just prior to a planned 2-month stay in [place].     The patient is next seen November 16, 2020 doing well though he now plans his trip to [place] in December.  We discussed reassessment prior to that trip.  Patient returns for repeat laboratory studies with quantitative hemoglobins obtained December 14 include an IgA of 78, IgG 567 and IgM of 31.  These are adequate and will not require infusions prior to stay in [place].  Fortunately he is feeling well and we discussed that he would be a candidate for COVID-19 vaccines as they become available.  The patient went on to be vaccinated and thereafter wintered in [place] for approximately 2 months. He is next reassessed 4/26/2021 having had no infectious complications and with a normal peripheral blood smear.    After discussion the patient was given Evusheld 3/21/2022.  He did well with this injection of his neck seen back 4/21/2022 continue to feel reasonably well but concerned about his general pulmonary reserve as he tries to continue running.  He had been released by pulmonary medicine after his previous viral pneumonitis several years ago.    Patient reevaluated 5/4/2023.  He has been having  additional issues, function, no infectious complications.  This includes lack of infections from his grandchildren as he participates in their care.  We discussed the COVID-19 vaccination is warranted.    Past Medical History:   Diagnosis Date   • Asthma    • Cancer     hairy cell leukemia   • Coronary artery disease     minimal with some minimal narrowing of 1 vessel   • ED (erectile dysfunction) of organic origin    • H/O vitamin D deficiency    • Hypertension    • Night sweats    • Pneumonia    • Thrombocytopenia        ONCOLOGIC HISTORY:  (History from previous dates can be found in the separate document.)    Current Outpatient Medications on File Prior to Visit   Medication Sig Dispense Refill   • albuterol sulfate  (90 Base) MCG/ACT inhaler Inhale 2 puffs Every 4 (Four) Hours As Needed for Wheezing. 1 inhaler 3   • buprenorphine-naloxone (SUBOXONE) 8-2 MG per SL tablet TAKE 2 TABLETS UNDER TONGUE EVERY MORNING CS EXPRESS SC HL  0   • ketorolac (ACULAR) 0.5 % ophthalmic solution INSTILL 1 DROP IN RIGHT EYE FOUR TIMES DAILY. START DROP 3 DAYS BEFORE SURGERY AND. CONTINUE ACCORDING TO POST-OP INSTRUCTIONS     • losartan (COZAAR) 100 MG tablet      • prednisoLONE acetate (PRED FORTE) 1 % ophthalmic suspension SHAKE LIQUID AND INSTILL 1 DROP IN RIGHT EYE FOUR TIMES DAILY. START DROP 3 DAYS BEFORE SURGERY AND. CONTINUE ACCORDING TO POST-OP INSTRUCTIONS     • PreviDent 5000 Booster Plus 1.1 % paste See Admin Instructions. follow package directions     • senna (SENOKOT) 8.6 MG tablet Take 1 tablet by mouth As Needed for Constipation.     • sildenafil (REVATIO) 20 MG tablet Take 1 tablet by mouth Daily. 10 tablet 2   • vardenafil (LEVITRA) 20 MG tablet TAKE 1 TABLET BY MOUTH AS NEEDED FOR ERECTILE DYSFUNCTION 20 tablet 3   • acetaZOLAMIDE (DIAMOX) 125 MG tablet acetazolamide 125 mg tablet   TAKE 1 TABLET BY MOUTH TWICE DAILY. START 24 HOURS BEFORE TRIP. STOP AFTER 3 DAYS. (Patient not taking: Reported on  "5/4/2023)     • lisinopril (PRINIVIL,ZESTRIL) 20 MG tablet        No current facility-administered medications on file prior to visit.       ALLERGIES:     Allergies   Allergen Reactions   • Ciprofloxacin Rash       Social History     Socioeconomic History   • Marital status:      Spouse name: Radha   Tobacco Use   • Smoking status: Never   • Smokeless tobacco: Never   Substance and Sexual Activity   • Alcohol use: Yes     Alcohol/week: 2.0 standard drinks     Types: 2 Cans of beer per week     Comment: social   • Drug use: No   • Sexual activity: Defer         Cancer-related family history includes Colon cancer in his mother.     Review of Systems   Constitutional: Negative for fatigue.   Respiratory: Negative for chest tightness and shortness of breath.         Reduced pulmonary reserve?   Gastrointestinal: Negative for abdominal pain, constipation, diarrhea, nausea and vomiting.   Neurological: Negative for weakness.   All other systems reviewed and are negative.    A comprehensive 14 point review of systems was performed and was negative except as mentioned.    Objective      Vitals:    05/04/23 1543   BP: 131/72   Pulse: 50   Resp: 18   Temp: 98.2 °F (36.8 °C)   TempSrc: Temporal   SpO2: 98%   Weight: 71.3 kg (157 lb 1.6 oz)   Height: 162.6 cm (64\")   PainSc: 0-No pain         5/4/2023     3:37 PM   Current Status   ECOG score 0        Physical Exam    GENERAL: Well-developed, well-nourished  male in no acute distress.   SKIN: Warm, dry without rashes, purpura or petechiae.   HEAD: Normocephalic.   EYES: Pupils equal, round and reactive to light. EOMs intact. Conjunctivae normal.   EARS: Hearing intact.   NOSE: Septum midline. No excoriations or nasal discharge.   MOUTH: Tongue is well-papillated; no stomatitis or ulcers. Lips normal.   THROAT: Oropharynx without lesions or exudates.   NECK: Supple with good range of motion; no thyromegaly or masses, no JVD or bruits.   LYMPHATICS: No " cervical, supraclavicular, axillary or inguinal adenopathy.   CHEST: Lungs clear to percussion and auscultation.   CARDIAC: Regular rate and rhythm without murmurs, rubs or gallops.   ABDOMEN: Soft, nontender with no organomegaly or masses.   EXTREMITIES: No clubbing, cyanosis or edema.   NEUROLOGICAL: No focal neurological deficits.    RECENT LABS:  Hematology WBC   Date Value Ref Range Status   05/04/2023 4.06 3.40 - 10.80 10*3/mm3 Final   02/20/2020 3.85 3.40 - 10.80 10*3/mm3 Final     RBC   Date Value Ref Range Status   05/04/2023 4.29 4.14 - 5.80 10*6/mm3 Final   02/20/2020 4.55 4.14 - 5.80 10*6/mm3 Final     Hemoglobin   Date Value Ref Range Status   05/04/2023 12.6 (L) 13.0 - 17.7 g/dL Final     Hematocrit   Date Value Ref Range Status   05/04/2023 37.2 (L) 37.5 - 51.0 % Final     Platelets   Date Value Ref Range Status   05/04/2023 137 (L) 140 - 450 10*3/mm3 Final        Assessment & Plan       A 62-year-old male with a history of hairy cell leukemia. He presented with leukopenia, thrombocytopenia, and splenomegaly. After diagnosis was confirmed he was treated with cladribine 02/06/2012-02/15/2012 by continuous IV infusion. H e developed cytopenias, but again not to a serious degree and ultimately exam in late February 2012 was negative for evidence of residual and flow cytometric assessment. Subsequent physical examination showed resolution of splenomegaly and normalization o f performance status. Followup CT also normalized as well per splenomegaly. The patient has been followed since and when seen in March 2013 and October 2013, was felt to be in CR. He had been seen just after recent viral illness and though he was feeling poorly, symptoms went on to improve and returned to baseline status. At 6 month review, the patient continues in remission. This further vindicates and since last visit, he has had no additional urinary symptoms either.   At this point we find no evidence of recurrent disease and again  feel that Mr. Morales remains in complete remission. This has been the case in 03/17/2015 and again is notable 10/13/2015 as well as April 2016, August 2016 and December 2017 .  The patient was last seen December 13, 2018 doing well without evidence recurrence .       The patient, unfortunately, required hospitalization December 19 through December 24 having been admitted with pneumonia and bilateral infiltrates associate with acute respiratory failure.  His studies went on to reveal evidence of metapneumovirus infection and, again, he is felt to have pneumonia with immunocompromise state, treated with broad-spectrum antibiotics and ICU management.  This included IV steroids and additional assessment included an IgG reduced at 497, IgA 104, IgM 31 and IgA 58.  As result he was given IV immunoglobulin at 400 mg/kg.   Fortunately he went on to slowly and steadily improve able to be switched to oral antibiotic therapies, tapering steroids and was able to be discharged home.  Chest x-ray December 23 prior to discharge included lungs now that were expanded with marked improvement in previous extensive bilateral infiltrates, cardiomegaly also noted.  As he and his wife are seen back January 7, 2020 we have discussed that he should be reassessed per hemoglobin levels including a possible subclass deficiency and be considered for potential prophylaxis with IVIG.  This is not absolute and that he has done well for many years without requiring such infusions but does bear further assessment.     The patient is next seen May 27, 2020 with immunoglobulins remaining relatively stable at above 600 drawn May 18, 2020 for IVIG, at which time subclasses were also acceptable, recheck May 27 with IgG of 614.  At this point supplementation is not necessary that we may review this as the fall approaches.  The patient agreeable to this plan.   The patient is reassessed August 24 2020 with repeat hemoglobin levels that are at the lower  end of normal for IgG.  You will not need immunoglobulin at this point.  He and his wife do plan a trip to [place] likely in December.  We will check him prior to that.  The patient is reassessed November 6, 2020 with IgG of 582, IgM of 27 and IgA of 87.  He was doing well we asked him to be reassessed December 15 for his quantitative immunoglobins prior to a stay in [place].  Fortunately these are found to be adequate and will not need to be supplemented.     Patient is reassessed 4/26/2021 doing well without any evidence of progression or recurrence of his hairy cell leukemia and, fortunately, no infectious complications.  This is again the case and the patient was given Evusheld 3/21/2022.  As he is seen 4/21/2022 his general performance status is excellent we discussed follow-up including pulmonary review as needed.    Patient reevaluated 5/4/2023.  He has been having additional issues, function, no infectious complications.  This includes lack of infections from his grandchildren as he participates in their care.  We discussed the COVID-19 vaccination is warranted.  Additional recent studies include acceptable CBC, paraprotein studies with no monoclonality, IgG of 586, IgA 95, IgM 36.        Plan:  *Again no IVIG needed at this point    *Patient urged to undergo COVID-19 vaccination at this point, repeat bivalent vaccination    *Follow-up 6 months CBC,

## 2023-05-04 ENCOUNTER — OFFICE VISIT (OUTPATIENT)
Dept: ONCOLOGY | Facility: CLINIC | Age: 63
End: 2023-05-04
Payer: COMMERCIAL

## 2023-05-04 ENCOUNTER — LAB (OUTPATIENT)
Dept: LAB | Facility: HOSPITAL | Age: 63
End: 2023-05-04
Payer: COMMERCIAL

## 2023-05-04 VITALS
HEIGHT: 64 IN | SYSTOLIC BLOOD PRESSURE: 131 MMHG | OXYGEN SATURATION: 98 % | BODY MASS INDEX: 26.82 KG/M2 | TEMPERATURE: 98.2 F | WEIGHT: 157.1 LBS | HEART RATE: 50 BPM | DIASTOLIC BLOOD PRESSURE: 72 MMHG | RESPIRATION RATE: 18 BRPM

## 2023-05-04 DIAGNOSIS — C91.41 HAIRY CELL LEUKEMIA, IN REMISSION: ICD-10-CM

## 2023-05-04 DIAGNOSIS — C91.41 HAIRY CELL LEUKEMIA, IN REMISSION: Primary | ICD-10-CM

## 2023-05-04 LAB
BASOPHILS # BLD AUTO: 0.01 10*3/MM3 (ref 0–0.2)
BASOPHILS NFR BLD AUTO: 0.2 % (ref 0–1.5)
DEPRECATED RDW RBC AUTO: 40.6 FL (ref 37–54)
EOSINOPHIL # BLD AUTO: 0.06 10*3/MM3 (ref 0–0.4)
EOSINOPHIL NFR BLD AUTO: 1.5 % (ref 0.3–6.2)
ERYTHROCYTE [DISTWIDTH] IN BLOOD BY AUTOMATED COUNT: 12.9 % (ref 12.3–15.4)
HCT VFR BLD AUTO: 37.2 % (ref 37.5–51)
HGB BLD-MCNC: 12.6 G/DL (ref 13–17.7)
IMM GRANULOCYTES # BLD AUTO: 0.02 10*3/MM3 (ref 0–0.05)
IMM GRANULOCYTES NFR BLD AUTO: 0.5 % (ref 0–0.5)
LYMPHOCYTES # BLD AUTO: 1 10*3/MM3 (ref 0.7–3.1)
LYMPHOCYTES NFR BLD AUTO: 24.6 % (ref 19.6–45.3)
MCH RBC QN AUTO: 29.4 PG (ref 26.6–33)
MCHC RBC AUTO-ENTMCNC: 33.9 G/DL (ref 31.5–35.7)
MCV RBC AUTO: 86.7 FL (ref 79–97)
MONOCYTES # BLD AUTO: 0.17 10*3/MM3 (ref 0.1–0.9)
MONOCYTES NFR BLD AUTO: 4.2 % (ref 5–12)
NEUTROPHILS NFR BLD AUTO: 2.8 10*3/MM3 (ref 1.7–7)
NEUTROPHILS NFR BLD AUTO: 69 % (ref 42.7–76)
NRBC BLD AUTO-RTO: 0 /100 WBC (ref 0–0.2)
PLATELET # BLD AUTO: 137 10*3/MM3 (ref 140–450)
PMV BLD AUTO: 10.5 FL (ref 6–12)
RBC # BLD AUTO: 4.29 10*6/MM3 (ref 4.14–5.8)
WBC NRBC COR # BLD: 4.06 10*3/MM3 (ref 3.4–10.8)

## 2023-05-04 PROCEDURE — 99214 OFFICE O/P EST MOD 30 MIN: CPT | Performed by: INTERNAL MEDICINE

## 2023-05-04 PROCEDURE — 85025 COMPLETE CBC W/AUTO DIFF WBC: CPT

## 2023-05-04 PROCEDURE — 36415 COLL VENOUS BLD VENIPUNCTURE: CPT

## 2023-05-04 RX ORDER — KETOROLAC TROMETHAMINE 5 MG/ML
SOLUTION OPHTHALMIC
COMMUNITY
Start: 2023-03-30

## 2023-05-04 RX ORDER — LOSARTAN POTASSIUM 100 MG/1
TABLET ORAL
COMMUNITY
Start: 2023-05-03

## 2023-05-04 RX ORDER — SODIUM FLUORIDE 6 MG/ML
PASTE, DENTIFRICE DENTAL SEE ADMIN INSTRUCTIONS
COMMUNITY
Start: 2023-02-02

## 2023-05-04 RX ORDER — PREDNISOLONE ACETATE 10 MG/ML
SUSPENSION/ DROPS OPHTHALMIC
COMMUNITY
Start: 2023-03-30

## 2023-05-18 DIAGNOSIS — D84.9 IMMUNOCOMPROMISED: Primary | ICD-10-CM

## 2023-07-17 VITALS
SYSTOLIC BLOOD PRESSURE: 151 MMHG | OXYGEN SATURATION: 98 % | DIASTOLIC BLOOD PRESSURE: 80 MMHG | HEIGHT: 64 IN | SYSTOLIC BLOOD PRESSURE: 156 MMHG | HEART RATE: 56 BPM | WEIGHT: 153 LBS | DIASTOLIC BLOOD PRESSURE: 77 MMHG

## 2023-07-18 ENCOUNTER — OFFICE (AMBULATORY)
Dept: URBAN - METROPOLITAN AREA CLINIC 76 | Facility: CLINIC | Age: 63
End: 2023-07-18

## 2023-07-18 DIAGNOSIS — K21.9 GASTRO-ESOPHAGEAL REFLUX DISEASE WITHOUT ESOPHAGITIS: ICD-10-CM

## 2023-07-18 DIAGNOSIS — Z86.010 PERSONAL HISTORY OF COLONIC POLYPS: ICD-10-CM

## 2023-07-18 DIAGNOSIS — Z80.0 FAMILY HISTORY OF MALIGNANT NEOPLASM OF DIGESTIVE ORGANS: ICD-10-CM

## 2023-07-18 DIAGNOSIS — R05.9 COUGH, UNSPECIFIED: ICD-10-CM

## 2023-07-18 PROCEDURE — 99204 OFFICE O/P NEW MOD 45 MIN: CPT | Performed by: INTERNAL MEDICINE

## 2023-08-04 VITALS
OXYGEN SATURATION: 99 % | SYSTOLIC BLOOD PRESSURE: 133 MMHG | DIASTOLIC BLOOD PRESSURE: 63 MMHG | RESPIRATION RATE: 7 BRPM | RESPIRATION RATE: 9 BRPM | SYSTOLIC BLOOD PRESSURE: 101 MMHG | TEMPERATURE: 97.6 F | HEART RATE: 66 BPM | SYSTOLIC BLOOD PRESSURE: 134 MMHG | DIASTOLIC BLOOD PRESSURE: 52 MMHG | HEART RATE: 57 BPM | RESPIRATION RATE: 16 BRPM | SYSTOLIC BLOOD PRESSURE: 98 MMHG | OXYGEN SATURATION: 98 % | HEIGHT: 64 IN | OXYGEN SATURATION: 100 % | DIASTOLIC BLOOD PRESSURE: 77 MMHG | DIASTOLIC BLOOD PRESSURE: 80 MMHG | WEIGHT: 147 LBS | OXYGEN SATURATION: 95 % | DIASTOLIC BLOOD PRESSURE: 68 MMHG | OXYGEN SATURATION: 94 % | TEMPERATURE: 97.3 F | HEART RATE: 52 BPM | HEART RATE: 56 BPM | HEART RATE: 58 BPM

## 2023-08-09 ENCOUNTER — AMBULATORY SURGICAL CENTER (AMBULATORY)
Dept: URBAN - METROPOLITAN AREA SURGERY 17 | Facility: SURGERY | Age: 63
End: 2023-08-09
Payer: COMMERCIAL

## 2023-08-09 ENCOUNTER — OFFICE (AMBULATORY)
Dept: URBAN - METROPOLITAN AREA PATHOLOGY 4 | Facility: PATHOLOGY | Age: 63
End: 2023-08-09
Payer: COMMERCIAL

## 2023-08-09 DIAGNOSIS — K21.9 GASTRO-ESOPHAGEAL REFLUX DISEASE WITHOUT ESOPHAGITIS: ICD-10-CM

## 2023-08-09 DIAGNOSIS — K21.00 GASTRO-ESOPHAGEAL REFLUX DISEASE WITH ESOPHAGITIS, WITHOUT B: ICD-10-CM

## 2023-08-09 DIAGNOSIS — K29.30 CHRONIC SUPERFICIAL GASTRITIS WITHOUT BLEEDING: ICD-10-CM

## 2023-08-09 DIAGNOSIS — K29.50 UNSPECIFIED CHRONIC GASTRITIS WITHOUT BLEEDING: ICD-10-CM

## 2023-08-09 LAB
GI HISTOLOGY: A. UNSPECIFIED: (no result)
GI HISTOLOGY: B. SELECT: (no result)
GI HISTOLOGY: PDF REPORT: (no result)

## 2023-08-09 PROCEDURE — 43239 EGD BIOPSY SINGLE/MULTIPLE: CPT | Performed by: INTERNAL MEDICINE

## 2023-08-09 PROCEDURE — 88305 TISSUE EXAM BY PATHOLOGIST: CPT | Performed by: INTERNAL MEDICINE

## 2023-08-09 PROCEDURE — 88342 IMHCHEM/IMCYTCHM 1ST ANTB: CPT | Performed by: INTERNAL MEDICINE

## 2023-10-16 ENCOUNTER — OFFICE VISIT (OUTPATIENT)
Age: 63
End: 2023-10-16
Payer: COMMERCIAL

## 2023-10-16 VITALS
HEIGHT: 64 IN | WEIGHT: 154.8 LBS | BODY MASS INDEX: 26.43 KG/M2 | SYSTOLIC BLOOD PRESSURE: 146 MMHG | DIASTOLIC BLOOD PRESSURE: 80 MMHG | HEART RATE: 53 BPM

## 2023-10-16 DIAGNOSIS — I25.84 CORONARY ARTERY CALCIFICATION: ICD-10-CM

## 2023-10-16 DIAGNOSIS — I10 HYPERTENSION, UNSPECIFIED TYPE: Primary | ICD-10-CM

## 2023-10-16 DIAGNOSIS — I25.10 CORONARY ARTERY CALCIFICATION: ICD-10-CM

## 2023-10-16 PROCEDURE — 93000 ELECTROCARDIOGRAM COMPLETE: CPT | Performed by: INTERNAL MEDICINE

## 2023-10-16 PROCEDURE — 99204 OFFICE O/P NEW MOD 45 MIN: CPT | Performed by: INTERNAL MEDICINE

## 2023-10-16 RX ORDER — ROSUVASTATIN CALCIUM 10 MG/1
10 TABLET, COATED ORAL DAILY
Qty: 30 TABLET | Refills: 11 | Status: SHIPPED | OUTPATIENT
Start: 2023-10-16

## 2023-10-16 RX ORDER — AZITHROMYCIN 250 MG/1
250 TABLET, FILM COATED ORAL
COMMUNITY

## 2023-10-16 NOTE — PROGRESS NOTES
Solo MAN Andrew  1960  Date of Office Visit: 10/16/23  Encounter Provider: Emeterio Majano MD  Place of Service: [facility] CARDIOLOGY      CHIEF COMPLAINT:  Hyperlipidemia  Coronary artery calcification    HISTORY OF PRESENT ILLNESS:  Very pleasant 63-year-old male with a medical history of hairy cell leukemia, mild coronary artery disease documented on prior heart catheterization performed in 2004, essential hypertension and coronary artery calcification who presents to me for evaluation.  He is followed very closely by Dr. Hoskins and has had recent lab work with an elevated LDL-P, however this is downtrending.  His LDL over the past few checks has been in the 70-80 range.  He did undergo coronary artery calcium score which was elevated to 57 with majority of his calcification being in the LAD and RCA.    He is very active and denies any chest pain or dyspnea on exertion.  His blood pressure is intermittently mildly elevated but for the most part controlled.    Review of Systems   Constitutional: Negative for fever and malaise/fatigue.   HENT:  Negative for nosebleeds and sore throat.    Eyes:  Negative for blurred vision and double vision.   Cardiovascular:  Negative for chest pain, claudication, palpitations and syncope.   Respiratory:  Negative for cough, shortness of breath and snoring.    Endocrine: Negative for cold intolerance, heat intolerance and polydipsia.   Skin:  Negative for itching, poor wound healing and rash.   Musculoskeletal:  Negative for joint pain, joint swelling, muscle weakness and myalgias.   Gastrointestinal:  Negative for abdominal pain, melena, nausea and vomiting.   Neurological:  Negative for light-headedness, loss of balance, seizures, vertigo and weakness.   Psychiatric/Behavioral:  Negative for altered mental status and depression.        Past Medical History:   Diagnosis Date    Asthma     Atrial fibrillation     Cancer     hairy cell  leukemia    Coronary artery disease     minimal with some minimal narrowing of 1 vessel    ED (erectile dysfunction) of organic origin     H/O vitamin D deficiency     Hypertension     Night sweats     Pneumonia     Thrombocytopenia        The following portions of the patient's history were reviewed and updated as appropriate: Social history , Family history, and Surgical history     Current Outpatient Medications on File Prior to Visit   Medication Sig Dispense Refill    albuterol sulfate  (90 Base) MCG/ACT inhaler Inhale 2 puffs Every 4 (Four) Hours As Needed for Wheezing. 1 inhaler 3    azithromycin (ZITHROMAX) 250 MG tablet 1 tablet.      buprenorphine-naloxone (SUBOXONE) 8-2 MG per SL tablet TAKE 2 TABLETS UNDER TONGUE EVERY MORNING CS EXPRESS SC HL  0    losartan (COZAAR) 100 MG tablet       PreviDent 5000 Booster Plus 1.1 % paste See Admin Instructions. follow package directions      senna (SENOKOT) 8.6 MG tablet Take 1 tablet by mouth As Needed for Constipation.      sildenafil (REVATIO) 20 MG tablet Take 1 tablet by mouth Daily. 10 tablet 2    vardenafil (LEVITRA) 20 MG tablet TAKE 1 TABLET BY MOUTH AS NEEDED FOR ERECTILE DYSFUNCTION 20 tablet 3    acetaZOLAMIDE (DIAMOX) 125 MG tablet acetazolamide 125 mg tablet   TAKE 1 TABLET BY MOUTH TWICE DAILY. START 24 HOURS BEFORE TRIP. STOP AFTER 3 DAYS. (Patient not taking: Reported on 5/4/2023)      ketorolac (ACULAR) 0.5 % ophthalmic solution INSTILL 1 DROP IN RIGHT EYE FOUR TIMES DAILY. START DROP 3 DAYS BEFORE SURGERY AND. CONTINUE ACCORDING TO POST-OP INSTRUCTIONS (Patient not taking: Reported on 10/16/2023)      lisinopril (PRINIVIL,ZESTRIL) 20 MG tablet       prednisoLONE acetate (PRED FORTE) 1 % ophthalmic suspension SHAKE LIQUID AND INSTILL 1 DROP IN RIGHT EYE FOUR TIMES DAILY. START DROP 3 DAYS BEFORE SURGERY AND. CONTINUE ACCORDING TO POST-OP INSTRUCTIONS (Patient not taking: Reported on 10/16/2023)       No current facility-administered  "medications on file prior to visit.       Allergies   Allergen Reactions    Ciprofloxacin Rash       Vitals:    10/16/23 1146   BP: 146/80   BP Location: Left arm   Patient Position: Sitting   Cuff Size: Adult   Pulse: 53   Weight: 70.2 kg (154 lb 12.8 oz)   Height: 162.6 cm (64\")     Body mass index is 26.57 kg/m².   Constitutional:       Appearance: Well-developed.   Eyes:      General: No scleral icterus.     Conjunctiva/sclera: Conjunctivae normal.   HENT:      Head: Normocephalic and atraumatic.   Neck:      Thyroid: No thyromegaly.      Vascular: Normal carotid pulses. No carotid bruit, hepatojugular reflux or JVD.      Trachea: No tracheal deviation.   Pulmonary:      Effort: No respiratory distress.      Breath sounds: Normal breath sounds. No decreased breath sounds. No wheezing. No rhonchi. No rales.   Chest:      Chest wall: Not tender to palpatation.   Cardiovascular:      Normal rate. Regular rhythm.      No gallop.    Pulses:     Carotid: 2+ bilaterally.     Radial: 2+ bilaterally.     Femoral: 2+ bilaterally.     Dorsalis pedis: 2+ bilaterally.     Posterior tibial: 2+ bilaterally.  Edema:     Peripheral edema absent.   Abdominal:      General: Bowel sounds are normal. There is no distension.      Palpations: Abdomen is soft.      Tenderness: There is no abdominal tenderness.   Musculoskeletal:         General: No deformity.      Cervical back: Normal range of motion and neck supple. Skin:     Findings: No erythema or rash.   Neurological:      Mental Status: Alert and oriented to person, place, and time.      Sensory: No sensory deficit.   Psychiatric:         Behavior: Behavior normal.           Lab Results   Component Value Date    WBC 3.73 06/28/2023    HGB 13.5 06/28/2023    HCT 40.1 06/28/2023    MCV 86.1 06/28/2023     (L) 06/28/2023       Lab Results   Component Value Date    GLUCOSE 91 04/21/2022    BUN 29 (H) 04/21/2022    CREATININE 1.06 04/21/2022    EGFR 79.4 04/21/2022    BCR 27.4 " "04/21/2022    K 5.1 (H) 04/21/2022    CO2 28.1 04/21/2022    CALCIUM 9.6 04/21/2022    PROTENTOTREF 6.4 10/25/2022    ALBUMIN 4.1 10/25/2022    BILITOT 0.4 04/21/2022    AST 18 04/21/2022    ALT 17 04/21/2022       Lab Results   Component Value Date    GLUCOSE 91 04/21/2022    CALCIUM 9.6 04/21/2022     04/21/2022    K 5.1 (H) 04/21/2022    CO2 28.1 04/21/2022     04/21/2022    BUN 29 (H) 04/21/2022    CREATININE 1.06 04/21/2022    EGFR 79.4 04/21/2022    BCR 27.4 04/21/2022    ANIONGAP 6.9 04/21/2022       No results found for: \"CHOL\", \"CHLPL\", \"TRIG\", \"HDL\", \"LDL\", \"LDLDIRECT\"      ECG 12 Lead    Date/Time: 10/16/2023 12:55 PM  Performed by: Emeterio Majano MD    Authorized by: Emeterio Majano MD  Comparison: compared with previous ECG from 12/21/2019  Similar to previous ECG  Rhythm: sinus bradycardia           Results for orders placed during the hospital encounter of 12/19/19    Adult Transthoracic Echo Complete W/ Cont if Necessary Per Protocol    Interpretation Summary  · Left ventricular systolic function is normal. Calculated EF = 62%. Estimated EF was in agreement with the calculated EF. Estimated EF = 62%. Normal left ventricular cavity size and wall thickness noted. All left ventricular wall segments contract normally. Left ventricular diastolic function is normal.  · Mild MAC is present. Trace mitral valve regurgitation is present  · Mild tricuspid valve regurgitation is present. Estimated right ventricular systolic pressure from tricuspid regurgitation is mildly elevated (35-45 mmHg). Calculated right ventricular systolic pressure from tricuspid regurgitation is 39 mmHg.      DISCUSSION/SUMMARY very pleasant 63-year-old male with medical history of coronary artery calcification, mildly elevated LDL and LDL-P, essential hypertension, who presents to me for evaluation.  He is asymptomatic and able to work out almost daily without any limitation.  TURNER cardiovascular risk score " taking into account his coronary artery calcification score is around 11%.  I do think there is a benefit in placing him on a statin in this scenario.    We had a long conversation regarding this and he is agreeable.    1.  Coronary artery calcification/mildly elevated LDL and LDL-P  - Recommend starting low-dose Crestor therapy.  Patient is agreeable.  He will send me a message in a couple weeks to let me know how he is tolerating this    2.  Essential hypertension: Continue current medical regimen.  Defer management to primary.

## 2023-10-31 NOTE — PROGRESS NOTES
Subjective Patient again doing well, no infectious complications                                                                         REASONS FOR FOLLOWUP:    Presentation with thrombocytopenia, mild leukopenia and splenomegaly.   Diagnosis hairy cell leukemia.   Status post hospitalization 02/06/2012-02/15/2012 per treatment with cladribine (2CdA x 7 days continuous IV infusion).   Evidence of CR noted 05/09/2012 with interval resolution of splenomegaly, notable on physical exam and peripheral blood smear.   Patien t with continued CR noted 09/05/2012, every 3 month assessment to 1 year anniversary planned, every 4 months 2nd year, every 6 months years 3-5.   Patient seen 03/05/2013 stable, with plans to move to every 4 month assessment. Patient proceeding through t reatment with Suboxone.   The patient was seen on 10/25/2013, stable hematologically and reassessment q.6 months planned.   The patient was seen 04/10/2014, status post recent apparent viral illness with normalization of peripheral blood counts and symptoms.   Patient seen 09/26/2014, stable - continued remission noted.   Patient seen on 03/17/2015, stable in continued remission, every 6 month assessment planned.   Patient seen every 6 months to yearly without evidence recurrence-reviewed December 2018  Hospitalization December 19-December 24-acute respiratory failure with hypoxia, metapneumovirus  Patient assessed May 27, 2020, no additional respiratory issues.  Plans made to reassess immunoglobulin levels and supplement as needed  Patient reassessed August 2020 with acceptable immunoglobulin levels.  Patient assessed November 16, 2020, stable, reexamination prior to [place] stay in December 2020  Patient assessed December 21, 2020, acceptable immunoglobulin levels, [facility] x2 months planned  Patient reassessed 4/26/2021 with no infectious complications.  Patient reviewed 4/21/2022 again without infectious complications, status post  Fam  Patient seen 5/4/2023, no infectious complications, patient urged to follow-up bivalent COVID-19 vaccination  Patient seen 11/1/2023, stable with mild evidence of cytopenias.                         History of Present Illness         The patient is a 63-year-old male who has been previously healthy. He had had some routine blood work performed recently with Dr. Sinclair on 01/17/2012 showing significant thrombocytopenia with a platelet count of 79,000. His white count was low normal at 4700 with decreased granulocytes at 31% with lymphocytes elevated at 55%. His serum chemistries at the time were unremarkable including normal liver function tests. Mr. Morales had been feeling reasonably w ell although he reported some fatigue. He has had no fevers, chills, night sweats or unexplained weight loss. He has been on a weight loss program and following a low carb diet and has intentionally lost about 40 pounds in the last year or two. He had noted some easier bruising but had been taking aspirin and fish oil even prior to noticing his platelet count was low.   On his examination in the office 01/31/2012 the patient had significant splenomegaly with spleen tip palpable approximately 7-8 cm below the left costal margin. The spleen is not particularly tender and did not extend to midline. Peripheral smear showed large platelets and atypical lymphocytes with no blasts. Mr. Morales was assessed by flow cytometry peripheral blood showing androgeni c profile consistent with hairy cell leukemia. Bone marrow aspirate and biopsy also was confirmatory for hairy cell leukemia with normal chromosomal complement. 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 Additionally CT abdomen and pelvis demonstrated splenomegaly with spleen measuring 22 cm in craniocaudal extent up to 16.7 x 8 in the axial plane. No other abnormalities were present. After discussion  the patient was advised per linda osborn with 2CdA. He was admitted 02/06/2015-02/15/2012 with a dual lumen PICC line placed. 2CdA began after initial hydration and continued over the subsequent 7 days by continuous IV infusion. He did relatively well except for weakness and fatigue. He ha d additional issues with constipation and exacerbation of chronic low back pain. He further had development of neutropenia 02/12/2012 and was placed on Diflucan, Valtrex and Levaquin. These he also tolerated well. He also required transfusion just prior to discharge. Finally the patient was asked to return 02/16/2012 for Neulasta which he did return back to take in the office. He subsequently has done relatively well as an outpatient with white count increasing to 13,000 by 02/20/2012 and peaking at 20 , 000 by 02/24/2012. Platelet count also improved substantially peaking 02/23/2012 at 177,000. He has otherwise returned and we were able to remove his PICC line by 02/24/2012 and on 02/27/2012 continued resolution of his leukopenia, antiviral and antibac t erial medication discontinued. The patient now returns today feeling weak in the morning but otherwise doing well for the rest of the day without any fevers, chills, and resolution of night sweats. Appetite, weight and performance status have remained o verall excellent. He has an episode of gouty arthropathy involving his left great toe. He used Indocin briefly and then went back to allopurinol but is now having his gout return this morning.   The patient thereafter did use Indocin successful and thereafter restarted allopurinol. His gouty arthropathy responded appropriately and has not returned. Followup counts were requested and as he returns today Mr. Morales is feeling well. He has returne d to exercise and nearly to his full-time job. He still notes some degree of fatigue in the a.m. and by the early afternoon approximately 12:30-1:00 p.m. He is at work full-time. He has  had no fever, chills, weight loss, night sweats, nausea or vomiting, c hange in bowel habit.   The patient was asked to return back for followup reassessment. CT scan 05/02/2012 per splenomegaly showed spleen to have regressed substantially in size. The AP in 1st dimension previously 16.7 x 8 is now 12.2 by 6.1. No other abno rmalities were present. Peripheral smear also shows no evidence of recurrent disease. The patient feels well and has returned to normal activity.   The patient was asked to return for followup now seen 09/05/2012 doing well with normal activity again with considerable improvement in his general performance status.   The patient is now seen 03/05/2013 continuing to do well. It came to the attention however through a [facility] report that he is current on Suboxone through Dr. Kalia Davidson. In discussion with the patient he evidently had difficulty in trying to discontinue pain medications in the spring of 2012. He eventually saw Dr. Davidson who was hoping to wean him altogether from pain medications with the use of Suboxone. 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 He does this primarily through diet at present.   The patient thereafter was asked to return in followup. He is now seen on 10/25/2013 feeling well overall, continuing with exercise and dietary program. He feels well overall except for mild headache, approximately at 3:00-4:00 p.m. each day(?).   The patient thereafter was asked to be seen back 6 months later . He is now seen 09/26/2014 doing well continuing his exercise and dietary program to terrific effect. He has had no issues since last seen and in fact has normalized his hypertension as a result of his dietary program.   The patient thereafter was asked to be seen 6 months later. He continues to do his exercise and dietary program to  wonderful effect. He has had no additional issues since last seen.   The patient is now seen back 6 months later, 10/13/2015, and fortunately does continue to do amazingly well. He has maintained his exercise and dietary program, again to excellent and continued significant effect.   Patient now reviewed August 04, 2016.  He continues to feel well except for periodic fatigue.    The patient is reviewed December 04, 2017.  Hematologically he is stable and remains in remission.  Plans are to eventually discontinue Suboxone as well.    Patient is next seen December 13, 2018.  He continues to exercise regularly and feels generally good though has been concerned about possibly splenic discomfort.  He also describes that his mother is been diagnosed with neuroendocrine carcinoma involving the bowel and is undergoing treatment up to and including immunotherapy.    The patient, unfortunately, required hospitalization December 19 through December 24 having been admitted with pneumonia and bilateral infiltrates associate with acute respiratory failure.  His studies went on to reveal evidence of metapneumovirus infection and, again, he is felt to have pneumonia with immunocompromise state, treated with broad-spectrum antibiotics and ICU management.  This included IV steroids and additional assessment included an IgG reduced at 497, IgA 104, IgM 31 and IgA 58.  As result he was given IV immunoglobulin at 400 mg/kg.   Fortunately he went on to slowly and steadily improve able to be switched to oral antibiotic therapies, tapering steroids and was able to be discharged home.  Chest x-ray December 23 prior to discharge included lungs now that were expanded with marked improvement in previous extensive bilateral infiltrates, cardiomegaly also noted.  Patient is next seen January 17, 2020 markedly improved and nearly back to normal activity.  We discussed immunoglobulin reassessment and possibly prophylaxis.  The patient is next seen  May 27, 2020 and continues to feel well.  He has had no additional infectious complications and peripheral smear is normal when seen in office today.  At the time of this dictation his immunoglobulins have returned as consistent with IgA of 85, IgG of 614 and IgM of 26.  At this point additional supplementation is not necessary but may become important as we move into the fall.    We had the patient return for follow-up assessing him for immunoglobulin levels August 17 with IgG of 590, IgA of 90, IgM 27, no monoclonality.  IV immunoglobulin was nonapproved as result.    He is now contacted by telephone and he is feeling well. We discussed his current immunnoglobulin levels that do not warrant prophylactic IVIG at this point.  We would want to assess this in November just prior to a planned 2-month stay in [place].     The patient is next seen November 16, 2020 doing well though he now plans his trip to [place] in December.  We discussed reassessment prior to that trip.  Patient returns for repeat laboratory studies with quantitative hemoglobins obtained December 14 include an IgA of 78, IgG 567 and IgM of 31.  These are adequate and will not require infusions prior to stay in [place].  Fortunately he is feeling well and we discussed that he would be a candidate for COVID-19 vaccines as they become available.  The patient went on to be vaccinated and thereafter wintered in [place] for approximately 2 months. He is next reassessed 4/26/2021 having had no infectious complications and with a normal peripheral blood smear.    After discussion the patient was given Evusheld 3/21/2022.  He did well with this injection of his neck seen back 4/21/2022 continue to feel reasonably well but concerned about his general pulmonary reserve as he tries to continue running.  He had been released by pulmonary medicine after his previous viral pneumonitis several years ago.    Patient reevaluated 5/4/2023.  He has been having  additional issues, function, no infectious complications.  This includes lack of infections from his grandchildren as he participates in their care.  We discussed the COVID-19 vaccination is warranted.    The patient is next seen back 10/31/2023.  He had been reviewed by cardiology with his LDL in the 70-80 range and the patient undergoing coronary artery calcium score elevated to 57 with majority being in the LAD and RCA.  Recommendations 10/16/2023 included starting low-dose Crestor therapy continuing antihypertensive meds with the patient's coronary arterial calcification score around 11%.  The patient when seen is feeling quite well.  He is developing mild cytopenias and will be checked every 3 month intervals.    Past Medical History:   Diagnosis Date    Asthma     Atrial fibrillation     Cancer     hairy cell leukemia    Coronary artery disease     minimal with some minimal narrowing of 1 vessel    ED (erectile dysfunction) of organic origin     H/O vitamin D deficiency     Hypertension     Night sweats     Pneumonia     Thrombocytopenia        ONCOLOGIC HISTORY:  (History from previous dates can be found in the separate document.)    Current Outpatient Medications on File Prior to Visit   Medication Sig Dispense Refill    albuterol sulfate  (90 Base) MCG/ACT inhaler Inhale 2 puffs Every 4 (Four) Hours As Needed for Wheezing. 1 inhaler 3    azithromycin (ZITHROMAX) 250 MG tablet 1 tablet.      buprenorphine-naloxone (SUBOXONE) 8-2 MG per SL tablet TAKE 2 TABLETS UNDER TONGUE EVERY MORNING CS EXPRESS SC HL  0    losartan (COZAAR) 100 MG tablet       PreviDent 5000 Booster Plus 1.1 % paste See Admin Instructions. follow package directions      rosuvastatin (CRESTOR) 10 MG tablet Take 1 tablet by mouth Daily. 30 tablet 11    senna (SENOKOT) 8.6 MG tablet Take 1 tablet by mouth As Needed for Constipation.      sildenafil (REVATIO) 20 MG tablet Take 1 tablet by mouth Daily. 10 tablet 2    vardenafil  "(LEVITRA) 20 MG tablet TAKE 1 TABLET BY MOUTH AS NEEDED FOR ERECTILE DYSFUNCTION 20 tablet 3     No current facility-administered medications on file prior to visit.       ALLERGIES:     Allergies   Allergen Reactions    Ciprofloxacin Rash       Social History     Socioeconomic History    Marital status:      Spouse name: Radha   Tobacco Use    Smoking status: Never    Smokeless tobacco: Never   Substance and Sexual Activity    Alcohol use: Yes     Alcohol/week: 2.0 standard drinks of alcohol     Types: 2 Cans of beer per week     Comment: social    Drug use: No    Sexual activity: Defer         Cancer-related family history includes Colon cancer in his mother.     Review of Systems   Constitutional:  Negative for fatigue.   Respiratory:  Negative for chest tightness and shortness of breath.    Gastrointestinal:  Negative for abdominal pain, constipation, diarrhea, nausea and vomiting.   Neurological:  Negative for weakness.   All other systems reviewed and are negative.    A comprehensive 14 point review of systems was performed and was negative except as mentioned.    Objective      Vitals:    11/01/23 1133   BP: 142/80   Pulse: 54   Resp: 16   Temp: 98.6 °F (37 °C)   TempSrc: Temporal   SpO2: 98%   Weight: 70.5 kg (155 lb 6.4 oz)   Height: 162.6 cm (64.02\")   PainSc: 0-No pain           11/1/2023    11:37 AM   Current Status   ECOG score 0        Physical Exam    GENERAL: Well-developed, well-nourished  male in no acute distress.   SKIN: Warm, dry without rashes, purpura or petechiae.   HEAD: Normocephalic.   EYES: Pupils equal, round and reactive to light. EOMs intact. Conjunctivae normal.   EARS: Hearing intact.   NOSE: Septum midline. No excoriations or nasal discharge.   MOUTH: Tongue is well-papillated; no stomatitis or ulcers. Lips normal.   THROAT: Oropharynx without lesions or exudates.   NECK: Supple with good range of motion; no thyromegaly or masses, no JVD or bruits.   LYMPHATICS: No " cervical, supraclavicular, axillary or inguinal adenopathy.   CHEST: Lungs clear to percussion and auscultation.   CARDIAC: Regular rate and rhythm without murmurs, rubs or gallops.   ABDOMEN: Soft, nontender with no organomegaly or masses.   EXTREMITIES: No clubbing, cyanosis or edema.   NEUROLOGICAL: No focal neurological deficits.    RECENT LABS:  Hematology WBC   Date Value Ref Range Status   11/01/2023 3.61 3.40 - 10.80 10*3/mm3 Final   02/20/2020 3.85 3.40 - 10.80 10*3/mm3 Final     RBC   Date Value Ref Range Status   11/01/2023 4.41 4.14 - 5.80 10*6/mm3 Final   02/20/2020 4.55 4.14 - 5.80 10*6/mm3 Final     Hemoglobin   Date Value Ref Range Status   11/01/2023 13.1 13.0 - 17.7 g/dL Final     Hematocrit   Date Value Ref Range Status   11/01/2023 37.1 (L) 37.5 - 51.0 % Final     Platelets   Date Value Ref Range Status   11/01/2023 123 (L) 140 - 450 10*3/mm3 Final        Assessment & Plan       A 63-year-old male with a history of hairy cell leukemia. He presented with leukopenia, thrombocytopenia, and splenomegaly. After diagnosis was confirmed he was treated with cladribine 02/06/2012-02/15/2012 by continuous IV infusion. ALLYSON puentes developed cytopenias, but again not to a serious degree and ultimately exam in late February 2012 was negative for evidence of residual and flow cytometric assessment. Subsequent physical examination showed resolution of splenomegaly and normalization o f performance status. Followup CT also normalized as well per splenomegaly. The patient has been followed since and when seen in March 2013 and October 2013, was felt to be in CR. He had been seen just after recent viral illness and though he was feeling poorly, symptoms went on to improve and returned to baseline status. At 6 month review, the patient continues in remission. This further vindicates and since last visit, he has had no additional urinary symptoms either.   At this point we find no evidence of recurrent disease and again  feel that Mr. Morales remains in complete remission. This has been the case in 03/17/2015 and again is notable 10/13/2015 as well as April 2016, August 2016 and December 2017 .  The patient was last seen December 13, 2018 doing well without evidence recurrence .       The patient, unfortunately, required hospitalization December 19 through December 24 having been admitted with pneumonia and bilateral infiltrates associate with acute respiratory failure.  His studies went on to reveal evidence of metapneumovirus infection and, again, he is felt to have pneumonia with immunocompromise state, treated with broad-spectrum antibiotics and ICU management.  This included IV steroids and additional assessment included an IgG reduced at 497, IgA 104, IgM 31 and IgA 58.  As result he was given IV immunoglobulin at 400 mg/kg.   Fortunately he went on to slowly and steadily improve able to be switched to oral antibiotic therapies, tapering steroids and was able to be discharged home.  Chest x-ray December 23 prior to discharge included lungs now that were expanded with marked improvement in previous extensive bilateral infiltrates, cardiomegaly also noted.  As he and his wife are seen back January 7, 2020 we have discussed that he should be reassessed per hemoglobin levels including a possible subclass deficiency and be considered for potential prophylaxis with IVIG.  This is not absolute and that he has done well for many years without requiring such infusions but does bear further assessment.     The patient is next seen May 27, 2020 with immunoglobulins remaining relatively stable at above 600 drawn May 18, 2020 for IVIG, at which time subclasses were also acceptable, recheck May 27 with IgG of 614.  At this point supplementation is not necessary that we may review this as the fall approaches.  The patient agreeable to this plan.   The patient is reassessed August 24 2020 with repeat hemoglobin levels that are at the lower  end of normal for IgG.  You will not need immunoglobulin at this point.  He and his wife do plan a trip to [place] likely in December.  We will check him prior to that.  The patient is reassessed November 6, 2020 with IgG of 582, IgM of 27 and IgA of 87.  He was doing well we asked him to be reassessed December 15 for his quantitative immunoglobins prior to a stay in [place].  Fortunately these are found to be adequate and will not need to be supplemented.     Patient is reassessed 4/26/2021 doing well without any evidence of progression or recurrence of his hairy cell leukemia and, fortunately, no infectious complications.  This is again the case and the patient was given Evusheld 3/21/2022.  As he is seen 4/21/2022 his general performance status is excellent we discussed follow-up including pulmonary review as needed.    Patient reevaluated 5/4/2023.  He has been having additional issues, function, no infectious complications.  This includes lack of infections from his grandchildren as he participates in their care.  We discussed the COVID-19 vaccination is warranted.  Additional recent studies include acceptable CBC, paraprotein studies with no monoclonality, IgG of 586, IgA 95, IgM 36.    The patient is next seen back 10/31/2023.  He had been reviewed by cardiology with his LDL in the 70-80 range and the patient undergoing coronary artery calcium score elevated to 57 with majority being in the LAD and RCA.  Recommendations 10/16/2023 included starting low-dose Crestor therapy continuing antihypertensive meds with the patient's coronary arterial calcification score around 11%.  The patient when seen is feeling quite well.  He is developing mild cytopenias and will be checked every 3 month intervals.        Plan:  *Again no IVIG needed at this point    *Patient has proceeded with COVID-19, influenza and RSV vaccinations    *3 months CBC    *Follow-up 6 months CBC, MD

## 2023-11-01 ENCOUNTER — LAB (OUTPATIENT)
Dept: LAB | Facility: HOSPITAL | Age: 63
End: 2023-11-01
Payer: COMMERCIAL

## 2023-11-01 ENCOUNTER — OFFICE VISIT (OUTPATIENT)
Dept: ONCOLOGY | Facility: CLINIC | Age: 63
End: 2023-11-01
Payer: COMMERCIAL

## 2023-11-01 VITALS
HEART RATE: 54 BPM | RESPIRATION RATE: 16 BRPM | SYSTOLIC BLOOD PRESSURE: 142 MMHG | WEIGHT: 155.4 LBS | TEMPERATURE: 98.6 F | DIASTOLIC BLOOD PRESSURE: 80 MMHG | BODY MASS INDEX: 26.53 KG/M2 | OXYGEN SATURATION: 98 % | HEIGHT: 64 IN

## 2023-11-01 DIAGNOSIS — C91.41 HAIRY CELL LEUKEMIA, IN REMISSION: ICD-10-CM

## 2023-11-01 DIAGNOSIS — D69.6 THROMBOCYTOPENIA: Primary | ICD-10-CM

## 2023-11-01 LAB
BASOPHILS # BLD AUTO: 0.02 10*3/MM3 (ref 0–0.2)
BASOPHILS NFR BLD AUTO: 0.6 % (ref 0–1.5)
DEPRECATED RDW RBC AUTO: 39 FL (ref 37–54)
EOSINOPHIL # BLD AUTO: 0.08 10*3/MM3 (ref 0–0.4)
EOSINOPHIL NFR BLD AUTO: 2.2 % (ref 0.3–6.2)
ERYTHROCYTE [DISTWIDTH] IN BLOOD BY AUTOMATED COUNT: 12.8 % (ref 12.3–15.4)
HCT VFR BLD AUTO: 37.1 % (ref 37.5–51)
HGB BLD-MCNC: 13.1 G/DL (ref 13–17.7)
IMM GRANULOCYTES # BLD AUTO: 0.03 10*3/MM3 (ref 0–0.05)
IMM GRANULOCYTES NFR BLD AUTO: 0.8 % (ref 0–0.5)
LYMPHOCYTES # BLD AUTO: 0.84 10*3/MM3 (ref 0.7–3.1)
LYMPHOCYTES NFR BLD AUTO: 23.3 % (ref 19.6–45.3)
MCH RBC QN AUTO: 29.7 PG (ref 26.6–33)
MCHC RBC AUTO-ENTMCNC: 35.3 G/DL (ref 31.5–35.7)
MCV RBC AUTO: 84.1 FL (ref 79–97)
MONOCYTES # BLD AUTO: 0.16 10*3/MM3 (ref 0.1–0.9)
MONOCYTES NFR BLD AUTO: 4.4 % (ref 5–12)
NEUTROPHILS NFR BLD AUTO: 2.48 10*3/MM3 (ref 1.7–7)
NEUTROPHILS NFR BLD AUTO: 68.7 % (ref 42.7–76)
NRBC BLD AUTO-RTO: 0 /100 WBC (ref 0–0.2)
PLATELET # BLD AUTO: 123 10*3/MM3 (ref 140–450)
PMV BLD AUTO: 10.5 FL (ref 6–12)
RBC # BLD AUTO: 4.41 10*6/MM3 (ref 4.14–5.8)
WBC NRBC COR # BLD: 3.61 10*3/MM3 (ref 3.4–10.8)

## 2023-11-01 PROCEDURE — 36415 COLL VENOUS BLD VENIPUNCTURE: CPT

## 2023-11-01 PROCEDURE — 85025 COMPLETE CBC W/AUTO DIFF WBC: CPT

## 2023-11-01 PROCEDURE — 99214 OFFICE O/P EST MOD 30 MIN: CPT | Performed by: INTERNAL MEDICINE

## 2024-03-12 ENCOUNTER — OFFICE (AMBULATORY)
Dept: URBAN - METROPOLITAN AREA CLINIC 76 | Facility: CLINIC | Age: 64
End: 2024-03-12

## 2024-03-12 VITALS — WEIGHT: 153 LBS | HEIGHT: 64 IN

## 2024-03-12 DIAGNOSIS — Z80.0 FAMILY HISTORY OF MALIGNANT NEOPLASM OF DIGESTIVE ORGANS: ICD-10-CM

## 2024-03-12 DIAGNOSIS — K21.9 GASTRO-ESOPHAGEAL REFLUX DISEASE WITHOUT ESOPHAGITIS: ICD-10-CM

## 2024-03-12 DIAGNOSIS — Z86.010 PERSONAL HISTORY OF COLONIC POLYPS: ICD-10-CM

## 2024-03-12 PROBLEM — Z12.11 SCREENING FOR COLONIC NEOPLASIA: Status: ACTIVE | Noted: 2019-04-15

## 2024-03-12 PROCEDURE — 99214 OFFICE O/P EST MOD 30 MIN: CPT | Performed by: NURSE PRACTITIONER

## 2024-04-11 ENCOUNTER — TELEPHONE (OUTPATIENT)
Dept: ONCOLOGY | Facility: CLINIC | Age: 64
End: 2024-04-11
Payer: COMMERCIAL

## 2024-04-11 NOTE — TELEPHONE ENCOUNTER
Caller: Solo Morales    Relationship to patient: Self    Best call back number: [phone]     Chief complaint: R/S FOR LATER TIME, POSSIBLY    Type of visit: LAB AND FOLLOW UP 1    Requested date: SAME DAY BUT LATER TIME, IF AVAILABLE, PLEASE CALL PT TO ADVISE IF THERE IS ANYTHING AVAILABLE .  PT HAS ANOTHER APPT THAT DAY AND MAY RUN LATE.  PLEASE CALL PT TO ADVISE BEFORE RESCHEDULING ANYTHING.    If rescheduling, when is the original appointment: 4/18 AT 3:00

## 2024-04-17 NOTE — PROGRESS NOTES
REASONS FOR FOLLOWUP:    Presentation with thrombocytopenia, mild leukopenia and splenomegaly.   Diagnosis hairy cell leukemia.   Status post hospitalization 02/06/2012-02/15/2012 per treatment with cladribine (2CdA x 7 days continuous IV infusion).   Evidence of CR noted 05/09/2012 with interval resolution of splenomegaly, notable on physical exam and peripheral blood smear.   Patien t with continued CR noted 09/05/2012, every 3 month assessment to 1 year anniversary planned, every 4 months 2nd year, every 6 months years 3-5.   Patient seen 03/05/2013 stable, with plans to move to every 4 month assessment. Patient proceeding through t reatment with Suboxone.   The patient was seen on 10/25/2013, stable hematologically and reassessment q.6 months planned.   The patient was seen 04/10/2014, status post recent apparent viral illness with normalization of peripheral blood counts and symptoms.   Patient seen 09/26/2014, stable - continued remission noted.   Patient seen on 03/17/2015, stable in continued remission, every 6 month assessment planned.   Patient seen every 6 months to yearly without evidence recurrence-reviewed December 2018  Hospitalization December 19-December 24-acute respiratory failure with hypoxia, metapneumovirus  Patient assessed May 27, 2020, no additional respiratory issues.  Plans made to reassess immunoglobulin levels and supplement as needed  Patient reassessed August 2020 with acceptable immunoglobulin levels.  Patient assessed November 16, 2020, stable, reexamination prior to [place] stay in December 2020  Patient assessed December 21, 2020, acceptable immunoglobulin levels, [facility] x2 months planned  Patient reassessed 4/26/2021 with no infectious complications.  Patient reviewed 4/21/2022 again without infectious complications, status post Evusheld  Patient seen 5/4/2023, no infectious complications, patient  urged to follow-up bivalent COVID-19 vaccination  Patient seen 11/1/2023, stable with mild evidence of cytopenias.  Evaluated 4/18/2024, no cytopenias, cardiovascular assessment ongoing                         History of Present Illness         The patient is a 63-year-old male who has been previously healthy. He had had some routine blood work performed recently with Dr. Sinclair on 01/17/2012 showing significant thrombocytopenia with a platelet count of 79,000. His white count was low normal at 4700 with decreased granulocytes at 31% with lymphocytes elevated at 55%. His serum chemistries at the time were unremarkable including normal liver function tests. Mr. Morales had been feeling reasonably w ell although he reported some fatigue. He has had no fevers, chills, night sweats or unexplained weight loss. He has been on a weight loss program and following a low carb diet and has intentionally lost about 40 pounds in the last year or two. He had noted some easier bruising but had been taking aspirin and fish oil even prior to noticing his platelet count was low.   On his examination in the office 01/31/2012 the patient had significant splenomegaly with spleen tip palpable approximately 7-8 cm below the left costal margin. The spleen is not particularly tender and did not extend to midline. Peripheral smear showed large platelets and atypical lymphocytes with no blasts. Mr. Morales was assessed by flow cytometry peripheral blood showing androgeni c profile consistent with hairy cell leukemia. Bone marrow aspirate and biopsy also was confirmatory for hairy cell leukemia with normal chromosomal complement. 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 Additionally CT abdomen and pelvis demonstrated splenomegaly with spleen measuring 22 cm in craniocaudal extent up to 16.7 x 8 in the axial plane. No other abnormalities were present. After discussion  the patient was advised per linda osborn with 2CdA. He was admitted 02/06/2015-02/15/2012 with a dual lumen PICC line placed. 2CdA began after initial hydration and continued over the subsequent 7 days by continuous IV infusion. He did relatively well except for weakness and fatigue. He ha d additional issues with constipation and exacerbation of chronic low back pain. He further had development of neutropenia 02/12/2012 and was placed on Diflucan, Valtrex and Levaquin. These he also tolerated well. He also required transfusion just prior to discharge. Finally the patient was asked to return 02/16/2012 for Neulasta which he did return back to take in the office. He subsequently has done relatively well as an outpatient with white count increasing to 13,000 by 02/20/2012 and peaking at 20 , 000 by 02/24/2012. Platelet count also improved substantially peaking 02/23/2012 at 177,000. He has otherwise returned and we were able to remove his PICC line by 02/24/2012 and on 02/27/2012 continued resolution of his leukopenia, antiviral and antibac t erial medication discontinued. The patient now returns today feeling weak in the morning but otherwise doing well for the rest of the day without any fevers, chills, and resolution of night sweats. Appetite, weight and performance status have remained o verall excellent. He has an episode of gouty arthropathy involving his left great toe. He used Indocin briefly and then went back to allopurinol but is now having his gout return this morning.   The patient thereafter did use Indocin successful and thereafter restarted allopurinol. His gouty arthropathy responded appropriately and has not returned. Followup counts were requested and as he returns today Mr. Morales is feeling well. He has returne d to exercise and nearly to his full-time job. He still notes some degree of fatigue in the a.m. and by the early afternoon approximately 12:30-1:00 p.m. He is at work full-time. He has  had no fever, chills, weight loss, night sweats, nausea or vomiting, c hange in bowel habit.   The patient was asked to return back for followup reassessment. CT scan 05/02/2012 per splenomegaly showed spleen to have regressed substantially in size. The AP in 1st dimension previously 16.7 x 8 is now 12.2 by 6.1. No other abno rmalities were present. Peripheral smear also shows no evidence of recurrent disease. The patient feels well and has returned to normal activity.   The patient was asked to return for followup now seen 09/05/2012 doing well with normal activity again with considerable improvement in his general performance status.   The patient is now seen 03/05/2013 continuing to do well. It came to the attention however through a [facility] report that he is current on Suboxone through Dr. Kalia Davidson. In discussion with the patient he evidently had difficulty in trying to discontinue pain medications in the spring of 2012. He eventually saw Dr. Davidson who was hoping to wean him altogether from pain medications with the use of Suboxone. 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 He does this primarily through diet at present.   The patient thereafter was asked to return in followup. He is now seen on 10/25/2013 feeling well overall, continuing with exercise and dietary program. He feels well overall except for mild headache, approximately at 3:00-4:00 p.m. each day(?).   The patient thereafter was asked to be seen back 6 months later . He is now seen 09/26/2014 doing well continuing his exercise and dietary program to terrific effect. He has had no issues since last seen and in fact has normalized his hypertension as a result of his dietary program.   The patient thereafter was asked to be seen 6 months later. He continues to do his exercise and dietary program to  wonderful effect. He has had no additional issues since last seen.   The patient is now seen back 6 months later, 10/13/2015, and fortunately does continue to do amazingly well. He has maintained his exercise and dietary program, again to excellent and continued significant effect.   Patient now reviewed August 04, 2016.  He continues to feel well except for periodic fatigue.    The patient is reviewed December 04, 2017.  Hematologically he is stable and remains in remission.  Plans are to eventually discontinue Suboxone as well.    Patient is next seen December 13, 2018.  He continues to exercise regularly and feels generally good though has been concerned about possibly splenic discomfort.  He also describes that his mother is been diagnosed with neuroendocrine carcinoma involving the bowel and is undergoing treatment up to and including immunotherapy.    The patient, unfortunately, required hospitalization December 19 through December 24 having been admitted with pneumonia and bilateral infiltrates associate with acute respiratory failure.  His studies went on to reveal evidence of metapneumovirus infection and, again, he is felt to have pneumonia with immunocompromise state, treated with broad-spectrum antibiotics and ICU management.  This included IV steroids and additional assessment included an IgG reduced at 497, IgA 104, IgM 31 and IgA 58.  As result he was given IV immunoglobulin at 400 mg/kg.   Fortunately he went on to slowly and steadily improve able to be switched to oral antibiotic therapies, tapering steroids and was able to be discharged home.  Chest x-ray December 23 prior to discharge included lungs now that were expanded with marked improvement in previous extensive bilateral infiltrates, cardiomegaly also noted.  Patient is next seen January 17, 2020 markedly improved and nearly back to normal activity.  We discussed immunoglobulin reassessment and possibly prophylaxis.  The patient is next seen  May 27, 2020 and continues to feel well.  He has had no additional infectious complications and peripheral smear is normal when seen in office today.  At the time of this dictation his immunoglobulins have returned as consistent with IgA of 85, IgG of 614 and IgM of 26.  At this point additional supplementation is not necessary but may become important as we move into the fall.    We had the patient return for follow-up assessing him for immunoglobulin levels August 17 with IgG of 590, IgA of 90, IgM 27, no monoclonality.  IV immunoglobulin was nonapproved as result.    He is now contacted by telephone and he is feeling well. We discussed his current immunnoglobulin levels that do not warrant prophylactic IVIG at this point.  We would want to assess this in November just prior to a planned 2-month stay in [place].     The patient is next seen November 16, 2020 doing well though he now plans his trip to [place] in December.  We discussed reassessment prior to that trip.  Patient returns for repeat laboratory studies with quantitative hemoglobins obtained December 14 include an IgA of 78, IgG 567 and IgM of 31.  These are adequate and will not require infusions prior to stay in [place].  Fortunately he is feeling well and we discussed that he would be a candidate for COVID-19 vaccines as they become available.  The patient went on to be vaccinated and thereafter wintered in [place] for approximately 2 months. He is next reassessed 4/26/2021 having had no infectious complications and with a normal peripheral blood smear.    After discussion the patient was given Evusheld 3/21/2022.  He did well with this injection of his neck seen back 4/21/2022 continue to feel reasonably well but concerned about his general pulmonary reserve as he tries to continue running.  He had been released by pulmonary medicine after his previous viral pneumonitis several years ago.    Patient reevaluated 5/4/2023.  He has been having  additional issues, function, no infectious complications.  This includes lack of infections from his grandchildren as he participates in their care.  We discussed the COVID-19 vaccination is warranted.    The patient is next seen back 10/31/2023.  He had been reviewed by cardiology with his LDL in the 70-80 range and the patient undergoing coronary artery calcium score elevated to 57 with majority being in the LAD and RCA.  Recommendations 10/16/2023 included starting low-dose Crestor therapy continuing antihypertensive meds with the patient's coronary arterial calcification score around 11%.  The patient when seen is feeling quite well.  He is developing mild cytopenias and will be checked every 3 month intervals.    The patient is next seen 4/8/2024 with continued good performance status.  He had noticed some degree of food sticking when he was eating midesophagus.  He has previously undergone EGD with no abnormalities and previous imaging for cardiac calcium scoring had led to an assessment by cardiology and institution of low-dose Crestor therapy.    Past Medical History:   Diagnosis Date    Asthma     Atrial fibrillation     Cancer     hairy cell leukemia    Coronary artery disease     minimal with some minimal narrowing of 1 vessel    ED (erectile dysfunction) of organic origin     H/O vitamin D deficiency     Hypertension     Night sweats     Pneumonia     Thrombocytopenia        ONCOLOGIC HISTORY:  (History from previous dates can be found in the separate document.)    Current Outpatient Medications on File Prior to Visit   Medication Sig Dispense Refill    buprenorphine-naloxone (SUBOXONE) 8-2 MG per SL tablet TAKE 2 TABLETS UNDER TONGUE EVERY MORNING CS EXPRESS SC HL  0    losartan (COZAAR) 100 MG tablet       PreviDent 5000 Booster Plus 1.1 % paste See Admin Instructions. follow package directions      rosuvastatin (CRESTOR) 10 MG tablet Take 1 tablet by mouth Daily. 30 tablet 11    senna (SENOKOT) 8.6 MG  "tablet Take 1 tablet by mouth As Needed for Constipation.      sildenafil (REVATIO) 20 MG tablet Take 1 tablet by mouth Daily. 10 tablet 2    vardenafil (LEVITRA) 20 MG tablet TAKE 1 TABLET BY MOUTH AS NEEDED FOR ERECTILE DYSFUNCTION 20 tablet 3    albuterol sulfate  (90 Base) MCG/ACT inhaler Inhale 2 puffs Every 4 (Four) Hours As Needed for Wheezing. 1 inhaler 3    azithromycin (ZITHROMAX) 250 MG tablet 1 tablet.       No current facility-administered medications on file prior to visit.       ALLERGIES:     Allergies   Allergen Reactions    Ciprofloxacin Rash       Social History     Socioeconomic History    Marital status:      Spouse name: Radha   Tobacco Use    Smoking status: Never    Smokeless tobacco: Never   Substance and Sexual Activity    Alcohol use: Yes     Alcohol/week: 2.0 standard drinks of alcohol     Types: 2 Cans of beer per week     Comment: social    Drug use: No    Sexual activity: Defer         Cancer-related family history includes Colon cancer in his mother.     Review of Systems   Constitutional:  Negative for fatigue.   Respiratory:  Negative for chest tightness and shortness of breath.    Gastrointestinal:  Negative for abdominal pain, constipation, diarrhea, nausea and vomiting.   Neurological:  Negative for weakness.   All other systems reviewed and are negative.    A comprehensive 14 point review of systems was performed and was negative except as mentioned.    Objective      Vitals:    04/18/24 1619   BP: 145/82   Pulse: (!) 49   Resp: 18   Temp: 98 °F (36.7 °C)   TempSrc: Temporal   SpO2: 99%   Weight: 70.3 kg (155 lb)   Height: 162.6 cm (64.02\")   PainSc: 0-No pain             11/1/2023    11:37 AM   Current Status   ECOG score 0        Physical Exam    GENERAL: Well-developed, well-nourished  male in no acute distress.   SKIN: Warm, dry without rashes, purpura or petechiae.   HEAD: Normocephalic.   EYES: Pupils equal, round and reactive to light. EOMs " intact. Conjunctivae normal.   EARS: Hearing intact.   NOSE: Septum midline. No excoriations or nasal discharge.   MOUTH: Tongue is well-papillated; no stomatitis or ulcers. Lips normal.   THROAT: Oropharynx without lesions or exudates.   NECK: Supple with good range of motion; no thyromegaly or masses, no JVD or bruits.   LYMPHATICS: No cervical, supraclavicular, axillary or inguinal adenopathy.   CHEST: Lungs clear to percussion and auscultation.   CARDIAC: Regular rate and rhythm without murmurs, rubs or gallops.   ABDOMEN: Soft, nontender with no organomegaly or masses.   EXTREMITIES: No clubbing, cyanosis or edema.   NEUROLOGICAL: No focal neurological deficits.    RECENT LABS:  Hematology WBC   Date Value Ref Range Status   04/18/2024 4.06 3.40 - 10.80 10*3/mm3 Final   02/20/2020 3.85 3.40 - 10.80 10*3/mm3 Final     RBC   Date Value Ref Range Status   04/18/2024 4.36 4.14 - 5.80 10*6/mm3 Final   02/20/2020 4.55 4.14 - 5.80 10*6/mm3 Final     Hemoglobin   Date Value Ref Range Status   04/18/2024 12.9 (L) 13.0 - 17.7 g/dL Final     Hematocrit   Date Value Ref Range Status   04/18/2024 37.4 (L) 37.5 - 51.0 % Final     Platelets   Date Value Ref Range Status   04/18/2024 133 (L) 140 - 450 10*3/mm3 Final        Assessment & Plan       A 63-year-old male with a history of hairy cell leukemia. He presented with leukopenia, thrombocytopenia, and splenomegaly. After diagnosis was confirmed he was treated with cladribine 02/06/2012-02/15/2012 by continuous IV infusion. H e developed cytopenias, but again not to a serious degree and ultimately exam in late February 2012 was negative for evidence of residual and flow cytometric assessment. Subsequent physical examination showed resolution of splenomegaly and normalization o f performance status. Followup CT also normalized as well per splenomegaly. The patient has been followed since and when seen in March 2013 and October 2013, was felt to be in CR. He had been seen just  after recent viral illness and though he was feeling poorly, symptoms went on to improve and returned to baseline status. At 6 month review, the patient continues in remission. This further vindicates and since last visit, he has had no additional urinary symptoms either.   At this point we find no evidence of recurrent disease and again feel that Mr. Morales remains in complete remission. This has been the case in 03/17/2015 and again is notable 10/13/2015 as well as April 2016, August 2016 and December 2017 .  The patient was last seen December 13, 2018 doing well without evidence recurrence .       The patient, unfortunately, required hospitalization December 19 through December 24 having been admitted with pneumonia and bilateral infiltrates associate with acute respiratory failure.  His studies went on to reveal evidence of metapneumovirus infection and, again, he is felt to have pneumonia with immunocompromise state, treated with broad-spectrum antibiotics and ICU management.  This included IV steroids and additional assessment included an IgG reduced at 497, IgA 104, IgM 31 and IgA 58.  As result he was given IV immunoglobulin at 400 mg/kg.   Fortunately he went on to slowly and steadily improve able to be switched to oral antibiotic therapies, tapering steroids and was able to be discharged home.  Chest x-ray December 23 prior to discharge included lungs now that were expanded with marked improvement in previous extensive bilateral infiltrates, cardiomegaly also noted.  As he and his wife are seen back January 7, 2020 we have discussed that he should be reassessed per hemoglobin levels including a possible subclass deficiency and be considered for potential prophylaxis with IVIG.  This is not absolute and that he has done well for many years without requiring such infusions but does bear further assessment.     The patient is next seen May 27, 2020 with immunoglobulins remaining relatively stable at above  600 drawn May 18, 2020 for IVIG, at which time subclasses were also acceptable, recheck May 27 with IgG of 614.  At this point supplementation is not necessary that we may review this as the fall approaches.  The patient agreeable to this plan.   The patient is reassessed August 24 2020 with repeat hemoglobin levels that are at the lower end of normal for IgG.  You will not need immunoglobulin at this point.  He and his wife do plan a trip to [place] likely in December.  We will check him prior to that.  The patient is reassessed November 6, 2020 with IgG of 582, IgM of 27 and IgA of 87.  He was doing well we asked him to be reassessed December 15 for his quantitative immunoglobins prior to a stay in [place].  Fortunately these are found to be adequate and will not need to be supplemented.     Patient is reassessed 4/26/2021 doing well without any evidence of progression or recurrence of his hairy cell leukemia and, fortunately, no infectious complications.  This is again the case and the patient was given Evusheld 3/21/2022.  As he is seen 4/21/2022 his general performance status is excellent we discussed follow-up including pulmonary review as needed.    Patient reevaluated 5/4/2023.  He has been having additional issues, function, no infectious complications.  This includes lack of infections from his grandchildren as he participates in their care.  We discussed the COVID-19 vaccination is warranted.  Additional recent studies include acceptable CBC, paraprotein studies with no monoclonality, IgG of 586, IgA 95, IgM 36.    The patient is next seen back 10/31/2023.  He had been reviewed by cardiology with his LDL in the 70-80 range and the patient undergoing coronary artery calcium score elevated to 57 with majority being in the LAD and RCA.  Recommendations 10/16/2023 included starting low-dose Crestor therapy continuing antihypertensive meds with the patient's coronary arterial calcification score around 11%.   The patient when seen is feeling quite well.  He is developing mild cytopenias and will be checked every 3 month intervals.      The patient is next seen 4/8/2024 with continued good performance status.  He had noticed some degree of food sticking when he was eating midesophagus.  He has previously undergone EGD with no abnormalities and previous imaging for cardiac calcium scoring had led to an assessment by cardiology and institution of low-dose Crestor therapy.        Plan:  *Continued assessment as above for cardiac and GI symptoms  *Follow-up 6 months CBC,, CMP, MD

## 2024-04-18 ENCOUNTER — OFFICE VISIT (OUTPATIENT)
Dept: ONCOLOGY | Facility: CLINIC | Age: 64
End: 2024-04-18
Payer: COMMERCIAL

## 2024-04-18 ENCOUNTER — LAB (OUTPATIENT)
Dept: LAB | Facility: HOSPITAL | Age: 64
End: 2024-04-18
Payer: COMMERCIAL

## 2024-04-18 VITALS
TEMPERATURE: 98 F | HEART RATE: 49 BPM | DIASTOLIC BLOOD PRESSURE: 82 MMHG | WEIGHT: 155 LBS | SYSTOLIC BLOOD PRESSURE: 145 MMHG | BODY MASS INDEX: 26.46 KG/M2 | RESPIRATION RATE: 18 BRPM | OXYGEN SATURATION: 99 % | HEIGHT: 64 IN

## 2024-04-18 DIAGNOSIS — C91.41 HAIRY CELL LEUKEMIA, IN REMISSION: Primary | ICD-10-CM

## 2024-04-18 DIAGNOSIS — C91.41 HAIRY CELL LEUKEMIA, IN REMISSION: ICD-10-CM

## 2024-04-18 DIAGNOSIS — D69.6 THROMBOCYTOPENIA: ICD-10-CM

## 2024-04-18 LAB
BASOPHILS # BLD AUTO: 0.01 10*3/MM3 (ref 0–0.2)
BASOPHILS NFR BLD AUTO: 0.2 % (ref 0–1.5)
DEPRECATED RDW RBC AUTO: 39.4 FL (ref 37–54)
EOSINOPHIL # BLD AUTO: 0.05 10*3/MM3 (ref 0–0.4)
EOSINOPHIL NFR BLD AUTO: 1.2 % (ref 0.3–6.2)
ERYTHROCYTE [DISTWIDTH] IN BLOOD BY AUTOMATED COUNT: 12.6 % (ref 12.3–15.4)
HCT VFR BLD AUTO: 37.4 % (ref 37.5–51)
HGB BLD-MCNC: 12.9 G/DL (ref 13–17.7)
IMM GRANULOCYTES # BLD AUTO: 0.02 10*3/MM3 (ref 0–0.05)
IMM GRANULOCYTES NFR BLD AUTO: 0.5 % (ref 0–0.5)
LYMPHOCYTES # BLD AUTO: 0.84 10*3/MM3 (ref 0.7–3.1)
LYMPHOCYTES NFR BLD AUTO: 20.7 % (ref 19.6–45.3)
MCH RBC QN AUTO: 29.6 PG (ref 26.6–33)
MCHC RBC AUTO-ENTMCNC: 34.5 G/DL (ref 31.5–35.7)
MCV RBC AUTO: 85.8 FL (ref 79–97)
MONOCYTES # BLD AUTO: 0.12 10*3/MM3 (ref 0.1–0.9)
MONOCYTES NFR BLD AUTO: 3 % (ref 5–12)
NEUTROPHILS NFR BLD AUTO: 3.02 10*3/MM3 (ref 1.7–7)
NEUTROPHILS NFR BLD AUTO: 74.4 % (ref 42.7–76)
NRBC BLD AUTO-RTO: 0 /100 WBC (ref 0–0.2)
PLATELET # BLD AUTO: 133 10*3/MM3 (ref 140–450)
PMV BLD AUTO: 10.7 FL (ref 6–12)
RBC # BLD AUTO: 4.36 10*6/MM3 (ref 4.14–5.8)
WBC NRBC COR # BLD AUTO: 4.06 10*3/MM3 (ref 3.4–10.8)

## 2024-04-18 PROCEDURE — 85025 COMPLETE CBC W/AUTO DIFF WBC: CPT

## 2024-04-18 PROCEDURE — 36415 COLL VENOUS BLD VENIPUNCTURE: CPT

## 2024-04-18 PROCEDURE — 99213 OFFICE O/P EST LOW 20 MIN: CPT | Performed by: INTERNAL MEDICINE

## 2024-04-25 ENCOUNTER — OFFICE VISIT (OUTPATIENT)
Dept: CARDIOLOGY | Facility: CLINIC | Age: 64
End: 2024-04-25
Payer: COMMERCIAL

## 2024-04-25 VITALS
BODY MASS INDEX: 26.73 KG/M2 | WEIGHT: 156.6 LBS | DIASTOLIC BLOOD PRESSURE: 72 MMHG | HEIGHT: 64 IN | HEART RATE: 57 BPM | SYSTOLIC BLOOD PRESSURE: 110 MMHG

## 2024-04-25 DIAGNOSIS — E78.5 HYPERLIPIDEMIA LDL GOAL <70: ICD-10-CM

## 2024-04-25 DIAGNOSIS — I25.84 CORONARY ARTERY CALCIFICATION: Primary | ICD-10-CM

## 2024-04-25 DIAGNOSIS — I15.9 SECONDARY HYPERTENSION: ICD-10-CM

## 2024-04-25 DIAGNOSIS — I25.10 CORONARY ARTERY CALCIFICATION: Primary | ICD-10-CM

## 2024-04-25 PROCEDURE — 93000 ELECTROCARDIOGRAM COMPLETE: CPT | Performed by: NURSE PRACTITIONER

## 2024-04-25 PROCEDURE — 99214 OFFICE O/P EST MOD 30 MIN: CPT | Performed by: NURSE PRACTITIONER

## 2024-04-25 RX ORDER — ROSUVASTATIN CALCIUM 10 MG/1
5 TABLET, COATED ORAL DAILY
Start: 2024-04-25

## 2024-04-25 NOTE — PROGRESS NOTES
Date of Office Visit: 2024  Encounter Provider: KONSTANTIN Christine  Place of Service: [facility] CARDIOLOGY  Patient Name: Solo Morales  :1960    No chief complaint on file.  : coronary calcification    HPI: Solo Morales is a 63 y.o. male who is a patient of  Dr. Majano.  Presents for 6-month office follow-up.  He has a history of hairy cell leukemia, mild coronary artery disease disease documented on prior heart cath performed in , essential hypertension and coronary artery calcification.  He is followed very closely by Dr. Hoskins and has had recent lab work with an elevated LDL however this is downtrending.  The over the past few checks have been between 70 and 80.  He did undergo coronary artery calcium score 2023 which was elevated 257 with majority of his calcification being in the LAD and RCA.    On his office visit with Dr. Majano in 2023, he was very active, denied any chest pain or dyspnea on exertion.  Blood pressure was controlled.   Mr. Morales was started rosuvastatin 10 mg with significant improvement in LDL.  LDL 79--> 36 between 2023 and 2023.     Presents today with no complaints.  He ran about 5 miles this morning with no issues.  Had discussion regarding his lipid panel.  Patient is now going to take rosuvastatin 5 mg once a day scheduled to have repeat lipid panel in a couple months with his PCP.  He will let us know how the numbers look.     Previous testing and notes have been reviewed by me.   Past Medical History:   Diagnosis Date    Asthma     Atrial fibrillation     Cancer     hairy cell leukemia    Coronary artery disease     minimal with some minimal narrowing of 1 vessel    ED (erectile dysfunction) of organic origin     H/O vitamin D deficiency     Hypertension     Night sweats     Pneumonia     Thrombocytopenia        Past Surgical History:   Procedure Laterality Date    CARDIAC CATHETERIZATION   12/2004    no stents, [facility], Non Obstructive CAD    COLONOSCOPY      two benign polyps removed    DENTAL PROCEDURE      Dental implants    VASECTOMY         Social History     Socioeconomic History    Marital status:      Spouse name: Radha   Tobacco Use    Smoking status: Never    Smokeless tobacco: Never   Substance and Sexual Activity    Alcohol use: Yes     Alcohol/week: 2.0 standard drinks of alcohol     Types: 2 Cans of beer per week     Comment: social    Drug use: No    Sexual activity: Defer       Family History   Problem Relation Age of Onset    Diabetes Other     Hypertension Other     Colon cancer Mother        Review of Systems   Constitutional: Negative.   HENT: Negative.     Eyes: Negative.    Cardiovascular: Negative.    Respiratory: Negative.     Endocrine: Negative.    Hematologic/Lymphatic: Negative.    Skin: Negative.    Musculoskeletal: Negative.    Gastrointestinal: Negative.    Genitourinary: Negative.    Neurological: Negative.    Psychiatric/Behavioral: Negative.     Allergic/Immunologic: Negative.        Allergies   Allergen Reactions    Ciprofloxacin Rash         Current Outpatient Medications:     albuterol sulfate  (90 Base) MCG/ACT inhaler, Inhale 2 puffs Every 4 (Four) Hours As Needed for Wheezing., Disp: 1 inhaler, Rfl: 3    azithromycin (ZITHROMAX) 250 MG tablet, 1 tablet., Disp: , Rfl:     buprenorphine-naloxone (SUBOXONE) 8-2 MG per SL tablet, TAKE 2 TABLETS UNDER TONGUE EVERY MORNING CS EXPRESS SC HL, Disp: , Rfl: 0    losartan (COZAAR) 100 MG tablet, , Disp: , Rfl:     PreviDent 5000 Booster Plus 1.1 % paste, See Admin Instructions. follow package directions, Disp: , Rfl:     rosuvastatin (CRESTOR) 10 MG tablet, Take 1 tablet by mouth Daily., Disp: 30 tablet, Rfl: 11    senna (SENOKOT) 8.6 MG tablet, Take 1 tablet by mouth As Needed for Constipation., Disp: , Rfl:     sildenafil (REVATIO) 20 MG tablet, Take 1 tablet by mouth Daily., Disp: 10  tablet, Rfl: 2    vardenafil (LEVITRA) 20 MG tablet, TAKE 1 TABLET BY MOUTH AS NEEDED FOR ERECTILE DYSFUNCTION, Disp: 20 tablet, Rfl: 3      Objective:     There were no vitals filed for this visit.  There is no height or weight on file to calculate BMI.    CT coronary calcium score 08/15/2023::  Total calcium score 257    Circumflex 21      EKG Treadmill Stress test 02/20/2020:  Stress Findings: No ECG evidence of myocardial ischemia  Negative clinical evidence of myocardial ischemia. Findings consistent with a normal ECG stress test    2D Echocardiogram 12/21/2019:  Left ventricular systolic function is normal. Calculated EF = 62%. Estimated EF was in agreement with the calculated EF. Estimated EF = 62%. Normal left ventricular cavity size and wall thickness noted. All left ventricular wall segments contract normally. Left ventricular diastolic function is normal.  Mild MAC is present. Trace mitral valve regurgitation is present  Mild tricuspid valve regurgitation is present. Estimated right ventricular systolic pressure from tricuspid regurgitation is mildly elevated (35-45 mmHg). Calculated right ventricular systolic pressure from tricuspid regurgitation is 39 mmHg.     PHYSICAL EXAM:    Constitutional:       Appearance: Healthy appearance. Not in distress.   Neck:      Vascular: No JVR. JVD normal.   Pulmonary:      Effort: Pulmonary effort is normal.      Breath sounds: Normal breath sounds. No wheezing. No rhonchi. No rales.   Chest:      Chest wall: Not tender to palpatation.   Cardiovascular:      PMI at left midclavicular line. Normal rate. Regular rhythm. Normal S1. Normal S2.       Murmurs: There is no murmur.      No gallop.  No click. No rub.   Pulses:     Intact distal pulses.   Edema:     Peripheral edema absent.   Abdominal:      General: Bowel sounds are normal.      Palpations: Abdomen is soft.      Tenderness: There is no abdominal tenderness.   Musculoskeletal: Normal range of  motion.         General: No tenderness. Skin:     General: Skin is warm and dry.   Neurological:      General: No focal deficit present.      Mental Status: Alert and oriented to person, place and time.           ECG 12 Lead    Date/Time: 4/25/2024 12:34 PM  Performed by: Lisa Cline APRN    Authorized by: Lisa Cline APRN  Comparison: compared with previous ECG from 10/16/2023  Similar to previous ECG  Rhythm: sinus rhythm  Rate: normal  BPM: 57  Conduction: conduction normal    Clinical impression: normal ECG            Assessment:       Diagnosis Plan   1. Coronary artery calcification        2. Secondary hypertension        3. Hyperlipidemia LDL goal <70          No orders of the defined types were placed in this encounter.         Plan:       Coronary artery calcifications/mildly elevated LDL and LDL P: LDL improved with low-dose Crestor.    Hypertension: Controlled.  Managed by primary    Mr. Morales will follow with Dr. Majano on an annual basis.         Your medication list            Accurate as of April 25, 2024 10:00 AM. If you have any questions, ask your nurse or doctor.                CONTINUE taking these medications        Instructions Last Dose Given Next Dose Due   albuterol sulfate  (90 Base) MCG/ACT inhaler  Commonly known as: PROVENTIL HFA;VENTOLIN HFA;PROAIR HFA      Inhale 2 puffs Every 4 (Four) Hours As Needed for Wheezing.       azithromycin 250 MG tablet  Commonly known as: ZITHROMAX      1 tablet.       buprenorphine-naloxone 8-2 MG per SL tablet  Commonly known as: SUBOXONE      TAKE 2 TABLETS UNDER TONGUE EVERY MORNING CS EXPRESS SC HL       losartan 100 MG tablet  Commonly known as: COZAAR           PreviDent 5000 Booster Plus 1.1 % paste  Generic drug: Sodium Fluoride      See Admin Instructions. follow package directions       rosuvastatin 10 MG tablet  Commonly known as: CRESTOR      Take 1 tablet by mouth Daily.       senna 8.6 MG tablet  Commonly known as:  SENOKOT      Take 1 tablet by mouth As Needed for Constipation.       sildenafil 20 MG tablet  Commonly known as: REVATIO      Take 1 tablet by mouth Daily.       vardenafil 20 MG tablet  Commonly known as: LEVITRA      TAKE 1 TABLET BY MOUTH AS NEEDED FOR ERECTILE DYSFUNCTION                  As always, it has been a pleasure to participate in your patient's care.      Sincerely,       KONSTANTIN Ruggiero

## 2024-06-28 VITALS
OXYGEN SATURATION: 97 % | DIASTOLIC BLOOD PRESSURE: 61 MMHG | RESPIRATION RATE: 18 BRPM | SYSTOLIC BLOOD PRESSURE: 106 MMHG | DIASTOLIC BLOOD PRESSURE: 47 MMHG | RESPIRATION RATE: 21 BRPM | DIASTOLIC BLOOD PRESSURE: 79 MMHG | RESPIRATION RATE: 10 BRPM | WEIGHT: 151 LBS | RESPIRATION RATE: 20 BRPM | HEIGHT: 64 IN | RESPIRATION RATE: 16 BRPM | HEART RATE: 45 BPM | SYSTOLIC BLOOD PRESSURE: 114 MMHG | DIASTOLIC BLOOD PRESSURE: 73 MMHG | RESPIRATION RATE: 13 BRPM | SYSTOLIC BLOOD PRESSURE: 150 MMHG | RESPIRATION RATE: 17 BRPM | SYSTOLIC BLOOD PRESSURE: 155 MMHG | DIASTOLIC BLOOD PRESSURE: 58 MMHG | HEART RATE: 63 BPM | DIASTOLIC BLOOD PRESSURE: 59 MMHG | SYSTOLIC BLOOD PRESSURE: 103 MMHG | SYSTOLIC BLOOD PRESSURE: 105 MMHG | TEMPERATURE: 97.5 F | SYSTOLIC BLOOD PRESSURE: 143 MMHG | HEART RATE: 58 BPM | HEART RATE: 59 BPM | OXYGEN SATURATION: 100 % | DIASTOLIC BLOOD PRESSURE: 54 MMHG | DIASTOLIC BLOOD PRESSURE: 67 MMHG | RESPIRATION RATE: 179 BRPM | SYSTOLIC BLOOD PRESSURE: 138 MMHG | DIASTOLIC BLOOD PRESSURE: 48 MMHG | TEMPERATURE: 7.4 F | HEART RATE: 55 BPM | DIASTOLIC BLOOD PRESSURE: 55 MMHG | HEART RATE: 54 BPM | OXYGEN SATURATION: 99 % | RESPIRATION RATE: 11 BRPM | HEART RATE: 46 BPM | SYSTOLIC BLOOD PRESSURE: 108 MMHG | SYSTOLIC BLOOD PRESSURE: 112 MMHG | HEART RATE: 47 BPM | HEART RATE: 57 BPM | HEART RATE: 56 BPM | OXYGEN SATURATION: 98 %

## 2024-07-02 ENCOUNTER — AMBULATORY SURGICAL CENTER (AMBULATORY)
Dept: URBAN - METROPOLITAN AREA SURGERY 17 | Facility: SURGERY | Age: 64
End: 2024-07-02
Payer: MEDICARE

## 2024-07-02 ENCOUNTER — OFFICE (AMBULATORY)
Dept: URBAN - METROPOLITAN AREA PATHOLOGY 4 | Facility: PATHOLOGY | Age: 64
End: 2024-07-02
Payer: MEDICARE

## 2024-07-02 DIAGNOSIS — Z12.11 ENCOUNTER FOR SCREENING FOR MALIGNANT NEOPLASM OF COLON: ICD-10-CM

## 2024-07-02 DIAGNOSIS — D12.4 BENIGN NEOPLASM OF DESCENDING COLON: ICD-10-CM

## 2024-07-02 DIAGNOSIS — Z80.0 FAMILY HISTORY OF MALIGNANT NEOPLASM OF DIGESTIVE ORGANS: ICD-10-CM

## 2024-07-02 DIAGNOSIS — K57.30 DIVERTICULOSIS OF LARGE INTESTINE WITHOUT PERFORATION OR ABS: ICD-10-CM

## 2024-07-02 LAB
GI HISTOLOGY: PDF REPORT: (no result)
Lab: (no result)

## 2024-07-02 PROCEDURE — 88305 TISSUE EXAM BY PATHOLOGIST: CPT | Performed by: PATHOLOGY

## 2024-07-02 PROCEDURE — 45385 COLONOSCOPY W/LESION REMOVAL: CPT | Mod: 33 | Performed by: INTERNAL MEDICINE

## 2024-07-30 ENCOUNTER — OFFICE (AMBULATORY)
Dept: URBAN - METROPOLITAN AREA CLINIC 76 | Facility: CLINIC | Age: 64
End: 2024-07-30

## 2024-07-30 VITALS
SYSTOLIC BLOOD PRESSURE: 183 MMHG | DIASTOLIC BLOOD PRESSURE: 79 MMHG | SYSTOLIC BLOOD PRESSURE: 140 MMHG | HEIGHT: 64 IN | WEIGHT: 157 LBS | DIASTOLIC BLOOD PRESSURE: 62 MMHG | HEART RATE: 62 BPM | OXYGEN SATURATION: 97 %

## 2024-07-30 DIAGNOSIS — K21.9 GASTRO-ESOPHAGEAL REFLUX DISEASE WITHOUT ESOPHAGITIS: ICD-10-CM

## 2024-07-30 DIAGNOSIS — Z86.010 PERSONAL HISTORY OF COLONIC POLYPS: ICD-10-CM

## 2024-07-30 DIAGNOSIS — Z80.0 FAMILY HISTORY OF MALIGNANT NEOPLASM OF DIGESTIVE ORGANS: ICD-10-CM

## 2024-07-30 DIAGNOSIS — K57.30 DIVERTICULOSIS OF LARGE INTESTINE WITHOUT PERFORATION OR ABS: ICD-10-CM

## 2024-07-30 PROBLEM — K63.5 POLYP OF COLON: Status: ACTIVE | Noted: 2024-07-02

## 2024-07-30 PROCEDURE — 99214 OFFICE O/P EST MOD 30 MIN: CPT | Performed by: NURSE PRACTITIONER

## 2024-09-19 RX ORDER — ROSUVASTATIN CALCIUM 10 MG/1
5 TABLET, COATED ORAL DAILY
Start: 2024-09-19 | End: 2024-09-20 | Stop reason: SDUPTHER

## 2024-09-23 RX ORDER — ROSUVASTATIN CALCIUM 10 MG/1
5 TABLET, COATED ORAL DAILY
Qty: 90 TABLET
Start: 2024-09-23

## 2024-10-10 ENCOUNTER — OFFICE VISIT (OUTPATIENT)
Dept: ONCOLOGY | Facility: CLINIC | Age: 64
End: 2024-10-10
Payer: MEDICARE

## 2024-10-10 ENCOUNTER — LAB (OUTPATIENT)
Dept: LAB | Facility: HOSPITAL | Age: 64
End: 2024-10-10
Payer: MEDICARE

## 2024-10-10 VITALS
SYSTOLIC BLOOD PRESSURE: 122 MMHG | OXYGEN SATURATION: 98 % | HEIGHT: 64 IN | RESPIRATION RATE: 16 BRPM | BODY MASS INDEX: 26.26 KG/M2 | TEMPERATURE: 98.3 F | WEIGHT: 153.8 LBS | HEART RATE: 57 BPM | DIASTOLIC BLOOD PRESSURE: 78 MMHG

## 2024-10-10 DIAGNOSIS — C91.41 HAIRY CELL LEUKEMIA, IN REMISSION: Primary | ICD-10-CM

## 2024-10-10 DIAGNOSIS — C91.41 HAIRY CELL LEUKEMIA, IN REMISSION: ICD-10-CM

## 2024-10-10 LAB
ALBUMIN SERPL-MCNC: 4.5 G/DL (ref 3.5–5.2)
ALBUMIN/GLOB SERPL: 2.3 G/DL
ALP SERPL-CCNC: 35 U/L (ref 39–117)
ALT SERPL W P-5'-P-CCNC: 27 U/L (ref 1–41)
ANION GAP SERPL CALCULATED.3IONS-SCNC: 10.3 MMOL/L (ref 5–15)
AST SERPL-CCNC: 24 U/L (ref 1–40)
BASOPHILS # BLD AUTO: 0 10*3/MM3 (ref 0–0.2)
BASOPHILS NFR BLD AUTO: 0 % (ref 0–1.5)
BILIRUB SERPL-MCNC: 0.4 MG/DL (ref 0–1.2)
BUN SERPL-MCNC: 31 MG/DL (ref 8–23)
BUN/CREAT SERPL: 25.8 (ref 7–25)
CALCIUM SPEC-SCNC: 9.7 MG/DL (ref 8.6–10.5)
CHLORIDE SERPL-SCNC: 102 MMOL/L (ref 98–107)
CO2 SERPL-SCNC: 26.7 MMOL/L (ref 22–29)
CREAT SERPL-MCNC: 1.2 MG/DL (ref 0.76–1.27)
DEPRECATED RDW RBC AUTO: 40.8 FL (ref 37–54)
EGFRCR SERPLBLD CKD-EPI 2021: 67.1 ML/MIN/1.73
EOSINOPHIL # BLD AUTO: 0.08 10*3/MM3 (ref 0–0.4)
EOSINOPHIL NFR BLD AUTO: 2.5 % (ref 0.3–6.2)
ERYTHROCYTE [DISTWIDTH] IN BLOOD BY AUTOMATED COUNT: 12.8 % (ref 12.3–15.4)
GLOBULIN UR ELPH-MCNC: 2 GM/DL
GLUCOSE SERPL-MCNC: 97 MG/DL (ref 65–99)
HCT VFR BLD AUTO: 41.2 % (ref 37.5–51)
HGB BLD-MCNC: 13.8 G/DL (ref 13–17.7)
IMM GRANULOCYTES # BLD AUTO: 0.01 10*3/MM3 (ref 0–0.05)
IMM GRANULOCYTES NFR BLD AUTO: 0.3 % (ref 0–0.5)
LYMPHOCYTES # BLD AUTO: 0.51 10*3/MM3 (ref 0.7–3.1)
LYMPHOCYTES NFR BLD AUTO: 16 % (ref 19.6–45.3)
MCH RBC QN AUTO: 29.6 PG (ref 26.6–33)
MCHC RBC AUTO-ENTMCNC: 33.5 G/DL (ref 31.5–35.7)
MCV RBC AUTO: 88.4 FL (ref 79–97)
MONOCYTES # BLD AUTO: 0.13 10*3/MM3 (ref 0.1–0.9)
MONOCYTES NFR BLD AUTO: 4.1 % (ref 5–12)
NEUTROPHILS NFR BLD AUTO: 2.46 10*3/MM3 (ref 1.7–7)
NEUTROPHILS NFR BLD AUTO: 77.1 % (ref 42.7–76)
NRBC BLD AUTO-RTO: 0 /100 WBC (ref 0–0.2)
PLATELET # BLD AUTO: 113 10*3/MM3 (ref 140–450)
PMV BLD AUTO: 9.4 FL (ref 6–12)
POTASSIUM SERPL-SCNC: 5.1 MMOL/L (ref 3.5–5.2)
PROT SERPL-MCNC: 6.5 G/DL (ref 6–8.5)
RBC # BLD AUTO: 4.66 10*6/MM3 (ref 4.14–5.8)
SODIUM SERPL-SCNC: 139 MMOL/L (ref 136–145)
WBC NRBC COR # BLD AUTO: 3.19 10*3/MM3 (ref 3.4–10.8)

## 2024-10-10 PROCEDURE — 3074F SYST BP LT 130 MM HG: CPT | Performed by: INTERNAL MEDICINE

## 2024-10-10 PROCEDURE — 1126F AMNT PAIN NOTED NONE PRSNT: CPT | Performed by: INTERNAL MEDICINE

## 2024-10-10 PROCEDURE — 99214 OFFICE O/P EST MOD 30 MIN: CPT | Performed by: INTERNAL MEDICINE

## 2024-10-10 PROCEDURE — 85025 COMPLETE CBC W/AUTO DIFF WBC: CPT

## 2024-10-10 PROCEDURE — 80053 COMPREHEN METABOLIC PANEL: CPT

## 2024-10-10 PROCEDURE — 36415 COLL VENOUS BLD VENIPUNCTURE: CPT

## 2024-10-10 PROCEDURE — 3078F DIAST BP <80 MM HG: CPT | Performed by: INTERNAL MEDICINE

## 2024-10-10 RX ORDER — VARENICLINE 0.03 MG/.05ML
SPRAY NASAL
COMMUNITY
Start: 2024-10-07

## 2024-10-10 NOTE — PROGRESS NOTES
REASONS FOR FOLLOWUP:    Presentation with thrombocytopenia, mild leukopenia and splenomegaly.   Diagnosis hairy cell leukemia.   Status post hospitalization 02/06/2012-02/15/2012 per treatment with cladribine (2CdA x 7 days continuous IV infusion).   Evidence of CR noted 05/09/2012 with interval resolution of splenomegaly, notable on physical exam and peripheral blood smear.   Patien t with continued CR noted 09/05/2012, every 3 month assessment to 1 year anniversary planned, every 4 months 2nd year, every 6 months years 3-5.   Patient seen 03/05/2013 stable, with plans to move to every 4 month assessment. Patient proceeding through t reatment with Suboxone.   The patient was seen on 10/25/2013, stable hematologically and reassessment q.6 months planned.   The patient was seen 04/10/2014, status post recent apparent viral illness with normalization of peripheral blood counts and symptoms.   Patient seen 09/26/2014, stable - continued remission noted.   Patient seen on 03/17/2015, stable in continued remission, every 6 month assessment planned.   Patient seen every 6 months to yearly without evidence recurrence-reviewed December 2018  Hospitalization December 19-December 24-acute respiratory failure with hypoxia, metapneumovirus  Patient assessed May 27, 2020, no additional respiratory issues.  Plans made to reassess immunoglobulin levels and supplement as needed  Patient reassessed August 2020 with acceptable immunoglobulin levels.  Patient assessed November 16, 2020, stable, reexamination prior to [place] stay in December 2020  Patient assessed December 21, 2020, acceptable immunoglobulin levels, [facility] x2 months planned  Patient reassessed 4/26/2021 with no infectious complications.  Patient reviewed 4/21/2022 again without infectious complications, status post Evusheld  Patient seen 5/4/2023, no infectious complications, patient  urged to follow-up bivalent COVID-19 vaccination  Patient seen 11/1/2023, stable with mild evidence of cytopenias.  Evaluated 4/18/2024, no cytopenias, cardiovascular assessment ongoing  Evaluated 10/10/2024 stable, mild cytopenias?  Postviral illness                         History of Present Illness         The patient is a 64-year-old male who has been previously healthy. He had had some routine blood work performed recently with Dr. Sinclair on 01/17/2012 showing significant thrombocytopenia with a platelet count of 79,000. His white count was low normal at 4700 with decreased granulocytes at 31% with lymphocytes elevated at 55%. His serum chemistries at the time were unremarkable including normal liver function tests. Mr. Morales had been feeling reasonably w ell although he reported some fatigue. He has had no fevers, chills, night sweats or unexplained weight loss. He has been on a weight loss program and following a low carb diet and has intentionally lost about 40 pounds in the last year or two. He had noted some easier bruising but had been taking aspirin and fish oil even prior to noticing his platelet count was low.   On his examination in the office 01/31/2012 the patient had significant splenomegaly with spleen tip palpable approximately 7-8 cm below the left costal margin. The spleen is not particularly tender and did not extend to midline. Peripheral smear showed large platelets and atypical lymphocytes with no blasts. Mr. Morales was assessed by flow cytometry peripheral blood showing androgeni c profile consistent with hairy cell leukemia. Bone marrow aspirate and biopsy also was confirmatory for hairy cell leukemia with normal chromosomal complement. 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 Additionally CT abdomen and pelvis demonstrated splenomegaly with spleen measuring 22 cm in craniocaudal extent up to 16.7 x 8 in the  axial plane. No other abnormalities were present. After discussion the patient was advised per linda osborn with 2CdA. He was admitted 02/06/2015-02/15/2012 with a dual lumen PICC line placed. 2CdA began after initial hydration and continued over the subsequent 7 days by continuous IV infusion. He did relatively well except for weakness and fatigue. He ha d additional issues with constipation and exacerbation of chronic low back pain. He further had development of neutropenia 02/12/2012 and was placed on Diflucan, Valtrex and Levaquin. These he also tolerated well. He also required transfusion just prior to discharge. Finally the patient was asked to return 02/16/2012 for Neulasta which he did return back to take in the office. He subsequently has done relatively well as an outpatient with white count increasing to 13,000 by 02/20/2012 and peaking at 20 , 000 by 02/24/2012. Platelet count also improved substantially peaking 02/23/2012 at 177,000. He has otherwise returned and we were able to remove his PICC line by 02/24/2012 and on 02/27/2012 continued resolution of his leukopenia, antiviral and antibac t erial medication discontinued. The patient now returns today feeling weak in the morning but otherwise doing well for the rest of the day without any fevers, chills, and resolution of night sweats. Appetite, weight and performance status have remained o verall excellent. He has an episode of gouty arthropathy involving his left great toe. He used Indocin briefly and then went back to allopurinol but is now having his gout return this morning.   The patient thereafter did use Indocin successful and thereafter restarted allopurinol. His gouty arthropathy responded appropriately and has not returned. Followup counts were requested and as he returns today Mr. Morales is feeling well. He has returne d to exercise and nearly to his full-time job. He still notes some degree of fatigue in the a.m. and by the early  afternoon approximately 12:30-1:00 p.m. He is at work full-time. He has had no fever, chills, weight loss, night sweats, nausea or vomiting, c hange in bowel habit.   The patient was asked to return back for followup reassessment. CT scan 05/02/2012 per splenomegaly showed spleen to have regressed substantially in size. The AP in 1st dimension previously 16.7 x 8 is now 12.2 by 6.1. No other abno rmalities were present. Peripheral smear also shows no evidence of recurrent disease. The patient feels well and has returned to normal activity.   The patient was asked to return for followup now seen 09/05/2012 doing well with normal activity again with considerable improvement in his general performance status.   The patient is now seen 03/05/2013 continuing to do well. It came to the attention however through a [facility] report that he is current on Suboxone through Dr. Kalia Davidson. In discussion with the patient he evidently had difficulty in trying to discontinue pain medications in the spring of 2012. He eventually saw Dr. Davidson who was hoping to wean him altogether from pain medications with the use of Suboxone. 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 He does this primarily through diet at present.   The patient thereafter was asked to return in followup. He is now seen on 10/25/2013 feeling well overall, continuing with exercise and dietary program. He feels well overall except for mild headache, approximately at 3:00-4:00 p.m. each day(?).   The patient thereafter was asked to be seen back 6 months later . He is now seen 09/26/2014 doing well continuing his exercise and dietary program to terrific effect. He has had no issues since last seen and in fact has normalized his hypertension as a result of his dietary program.   The patient thereafter was asked to be seen 6  months later. He continues to do his exercise and dietary program to wonderful effect. He has had no additional issues since last seen.   The patient is now seen back 6 months later, 10/13/2015, and fortunately does continue to do amazingly well. He has maintained his exercise and dietary program, again to excellent and continued significant effect.   Patient now reviewed August 04, 2016.  He continues to feel well except for periodic fatigue.    The patient is reviewed December 04, 2017.  Hematologically he is stable and remains in remission.  Plans are to eventually discontinue Suboxone as well.    Patient is next seen December 13, 2018.  He continues to exercise regularly and feels generally good though has been concerned about possibly splenic discomfort.  He also describes that his mother is been diagnosed with neuroendocrine carcinoma involving the bowel and is undergoing treatment up to and including immunotherapy.    The patient, unfortunately, required hospitalization December 19 through December 24 having been admitted with pneumonia and bilateral infiltrates associate with acute respiratory failure.  His studies went on to reveal evidence of metapneumovirus infection and, again, he is felt to have pneumonia with immunocompromise state, treated with broad-spectrum antibiotics and ICU management.  This included IV steroids and additional assessment included an IgG reduced at 497, IgA 104, IgM 31 and IgA 58.  As result he was given IV immunoglobulin at 400 mg/kg.   Fortunately he went on to slowly and steadily improve able to be switched to oral antibiotic therapies, tapering steroids and was able to be discharged home.  Chest x-ray December 23 prior to discharge included lungs now that were expanded with marked improvement in previous extensive bilateral infiltrates, cardiomegaly also noted.  Patient is next seen January 17, 2020 markedly improved and nearly back to normal activity.  We discussed  immunoglobulin reassessment and possibly prophylaxis.  The patient is next seen May 27, 2020 and continues to feel well.  He has had no additional infectious complications and peripheral smear is normal when seen in office today.  At the time of this dictation his immunoglobulins have returned as consistent with IgA of 85, IgG of 614 and IgM of 26.  At this point additional supplementation is not necessary but may become important as we move into the fall.    We had the patient return for follow-up assessing him for immunoglobulin levels August 17 with IgG of 590, IgA of 90, IgM 27, no monoclonality.  IV immunoglobulin was nonapproved as result.    He is now contacted by telephone and he is feeling well. We discussed his current immunnoglobulin levels that do not warrant prophylactic IVIG at this point.  We would want to assess this in November just prior to a planned 2-month stay in [place].     The patient is next seen November 16, 2020 doing well though he now plans his trip to [place] in December.  We discussed reassessment prior to that trip.  Patient returns for repeat laboratory studies with quantitative hemoglobins obtained December 14 include an IgA of 78, IgG 567 and IgM of 31.  These are adequate and will not require infusions prior to stay in [place].  Fortunately he is feeling well and we discussed that he would be a candidate for COVID-19 vaccines as they become available.  The patient went on to be vaccinated and thereafter wintered in [place] for approximately 2 months. He is next reassessed 4/26/2021 having had no infectious complications and with a normal peripheral blood smear.    After discussion the patient was given Evusheld 3/21/2022.  He did well with this injection of his neck seen back 4/21/2022 continue to feel reasonably well but concerned about his general pulmonary reserve as he tries to continue running.  He had been released by pulmonary medicine after his previous viral pneumonitis  several years ago.    Patient reevaluated 5/4/2023.  He has been having additional issues, function, no infectious complications.  This includes lack of infections from his grandchildren as he participates in their care.  We discussed the COVID-19 vaccination is warranted.    The patient is next seen back 10/31/2023.  He had been reviewed by cardiology with his LDL in the 70-80 range and the patient undergoing coronary artery calcium score elevated to 57 with majority being in the LAD and RCA.  Recommendations 10/16/2023 included starting low-dose Crestor therapy continuing antihypertensive meds with the patient's coronary arterial calcification score around 11%.  The patient when seen is feeling quite well.  He is developing mild cytopenias and will be checked every 3 month intervals.    The patient is next seen 4/8/2024 with continued good performance status.  He had noticed some degree of food sticking when he was eating midesophagus.  He has previously undergone EGD with no abnormalities and previous imaging for cardiac calcium scoring had led to an assessment by cardiology and institution of low-dose Crestor therapy.    The patient is next seen 10/10/2024 indicating that he was reviewed by GI and placed on PPI therapy which has helped reflux symptoms.  He is otherwise feeling well with no significant changes per symptoms.    Past Medical History:   Diagnosis Date    Asthma     Atrial fibrillation     Cancer     hairy cell leukemia    Coronary artery disease     minimal with some minimal narrowing of 1 vessel    ED (erectile dysfunction) of organic origin     H/O vitamin D deficiency     Hypertension     Night sweats     Pneumonia     Thrombocytopenia        ONCOLOGIC HISTORY:  (History from previous dates can be found in the separate document.)    Current Outpatient Medications on File Prior to Visit   Medication Sig Dispense Refill    albuterol sulfate  (90 Base) MCG/ACT inhaler Inhale 2 puffs Every 4  (Four) Hours As Needed for Wheezing. 1 inhaler 3    buprenorphine-naloxone (SUBOXONE) 8-2 MG per SL tablet TAKE 2 TABLETS UNDER TONGUE EVERY MORNING CS EXPRESS SC HL  0    losartan (COZAAR) 100 MG tablet       rosuvastatin (CRESTOR) 10 MG tablet Take 0.5 tablets by mouth Daily. 90 tablet     senna (SENOKOT) 8.6 MG tablet Take 1 tablet by mouth As Needed for Constipation.      sildenafil (REVATIO) 20 MG tablet Take 1 tablet by mouth Daily. 10 tablet 2    Tyrvaya 0.03 MG/ACT solution spray 1 spray into each nostril twice a day      vardenafil (LEVITRA) 20 MG tablet TAKE 1 TABLET BY MOUTH AS NEEDED FOR ERECTILE DYSFUNCTION 20 tablet 3    azithromycin (ZITHROMAX) 250 MG tablet 1 tablet. (Patient not taking: Reported on 4/25/2024)      PreviDent 5000 Booster Plus 1.1 % paste See Admin Instructions. follow package directions       No current facility-administered medications on file prior to visit.       ALLERGIES:     Allergies   Allergen Reactions    Ciprofloxacin Rash       Social History     Socioeconomic History    Marital status:      Spouse name: Radha   Tobacco Use    Smoking status: Never     Passive exposure: Never    Smokeless tobacco: Never   Vaping Use    Vaping status: Never Used   Substance and Sexual Activity    Alcohol use: Yes     Alcohol/week: 2.0 standard drinks of alcohol     Types: 2 Cans of beer per week     Comment: social    Drug use: No    Sexual activity: Defer         Cancer-related family history includes Colon cancer in his mother.     Review of Systems   Constitutional:  Negative for fatigue.   Respiratory:  Negative for chest tightness and shortness of breath.    Gastrointestinal:  Negative for abdominal pain, constipation, diarrhea, nausea and vomiting.   Neurological:  Negative for weakness.   All other systems reviewed and are negative.    A comprehensive 14 point review of systems was performed and was negative except as mentioned.    Objective      Vitals:    10/10/24 1150  "  BP: 122/78   Pulse: 57   Resp: 16   Temp: 98.3 °F (36.8 °C)   TempSrc: Oral   SpO2: 98%   Weight: 69.8 kg (153 lb 12.8 oz)   Height: 162.6 cm (64.02\")   PainSc: 0-No pain               10/10/2024    11:51 AM   Current Status   ECOG score 0        Physical Exam    GENERAL: Well-developed, well-nourished  male in no acute distress.   SKIN: Warm, dry without rashes, purpura or petechiae.   HEAD: Normocephalic.   EYES: Pupils equal, round and reactive to light. EOMs intact. Conjunctivae normal.   EARS: Hearing intact.   NOSE: Septum midline. No excoriations or nasal discharge.   MOUTH: Tongue is well-papillated; no stomatitis or ulcers. Lips normal.   THROAT: Oropharynx without lesions or exudates.   NECK: Supple with good range of motion; no thyromegaly or masses, no JVD or bruits.   LYMPHATICS: No cervical, supraclavicular, axillary or inguinal adenopathy.   CHEST: Lungs clear to percussion and auscultation.   CARDIAC: Regular rate and rhythm without murmurs, rubs or gallops.   ABDOMEN: Soft, nontender with no organomegaly or masses.   EXTREMITIES: No clubbing, cyanosis or edema.   NEUROLOGICAL: No focal neurological deficits.    RECENT LABS:  Hematology WBC   Date Value Ref Range Status   10/10/2024 3.19 (L) 3.40 - 10.80 10*3/mm3 Final   02/20/2020 3.85 3.40 - 10.80 10*3/mm3 Final     RBC   Date Value Ref Range Status   10/10/2024 4.66 4.14 - 5.80 10*6/mm3 Final   02/20/2020 4.55 4.14 - 5.80 10*6/mm3 Final     Hemoglobin   Date Value Ref Range Status   10/10/2024 13.8 13.0 - 17.7 g/dL Final     Hematocrit   Date Value Ref Range Status   10/10/2024 41.2 37.5 - 51.0 % Final     Platelets   Date Value Ref Range Status   10/10/2024 113 (L) 140 - 450 10*3/mm3 Final        Assessment & Plan       A 64-year-old male with a history of hairy cell leukemia. He presented with leukopenia, thrombocytopenia, and splenomegaly. After diagnosis was confirmed he was treated with cladribine 02/06/2012-02/15/2012 by " continuous IV infusion. ALLYSON puentes developed cytopenias, but again not to a serious degree and ultimately exam in late February 2012 was negative for evidence of residual and flow cytometric assessment. Subsequent physical examination showed resolution of splenomegaly and normalization o f performance status. Followup CT also normalized as well per splenomegaly. The patient has been followed since and when seen in March 2013 and October 2013, was felt to be in CR. He had been seen just after recent viral illness and though he was feeling poorly, symptoms went on to improve and returned to baseline status. At 6 month review, the patient continues in remission. This further vindicates and since last visit, he has had no additional urinary symptoms either.   At this point we find no evidence of recurrent disease and again feel that Mr. Morales remains in complete remission. This has been the case in 03/17/2015 and again is notable 10/13/2015 as well as April 2016, August 2016 and December 2017 .  The patient was last seen December 13, 2018 doing well without evidence recurrence .       The patient, unfortunately, required hospitalization December 19 through December 24 having been admitted with pneumonia and bilateral infiltrates associate with acute respiratory failure.  His studies went on to reveal evidence of metapneumovirus infection and, again, he is felt to have pneumonia with immunocompromise state, treated with broad-spectrum antibiotics and ICU management.  This included IV steroids and additional assessment included an IgG reduced at 497, IgA 104, IgM 31 and IgA 58.  As result he was given IV immunoglobulin at 400 mg/kg.   Fortunately he went on to slowly and steadily improve able to be switched to oral antibiotic therapies, tapering steroids and was able to be discharged home.  Chest x-ray December 23 prior to discharge included lungs now that were expanded with marked improvement in previous extensive bilateral  infiltrates, cardiomegaly also noted.  As he and his wife are seen back January 7, 2020 we have discussed that he should be reassessed per hemoglobin levels including a possible subclass deficiency and be considered for potential prophylaxis with IVIG.  This is not absolute and that he has done well for many years without requiring such infusions but does bear further assessment.     The patient is next seen May 27, 2020 with immunoglobulins remaining relatively stable at above 600 drawn May 18, 2020 for IVIG, at which time subclasses were also acceptable, recheck May 27 with IgG of 614.  At this point supplementation is not necessary that we may review this as the fall approaches.  The patient agreeable to this plan.   The patient is reassessed August 24 2020 with repeat hemoglobin levels that are at the lower end of normal for IgG.  You will not need immunoglobulin at this point.  He and his wife do plan a trip to [place] likely in December.  We will check him prior to that.  The patient is reassessed November 6, 2020 with IgG of 582, IgM of 27 and IgA of 87.  He was doing well we asked him to be reassessed December 15 for his quantitative immunoglobins prior to a stay in [place].  Fortunately these are found to be adequate and will not need to be supplemented.     Patient is reassessed 4/26/2021 doing well without any evidence of progression or recurrence of his hairy cell leukemia and, fortunately, no infectious complications.  This is again the case and the patient was given Evusheld 3/21/2022.  As he is seen 4/21/2022 his general performance status is excellent we discussed follow-up including pulmonary review as needed.    Patient reevaluated 5/4/2023.  He has been having additional issues, function, no infectious complications.  This includes lack of infections from his grandchildren as he participates in their care.  We discussed the COVID-19 vaccination is warranted.  Additional recent studies include  acceptable CBC, paraprotein studies with no monoclonality, IgG of 586, IgA 95, IgM 36.    The patient is next seen back 10/31/2023.  He had been reviewed by cardiology with his LDL in the 70-80 range and the patient undergoing coronary artery calcium score elevated to 57 with majority being in the LAD and RCA.  Recommendations 10/16/2023 included starting low-dose Crestor therapy continuing antihypertensive meds with the patient's coronary arterial calcification score around 11%.  The patient when seen is feeling quite well.  He is developing mild cytopenias and will be checked every 3 month intervals.      The patient is next seen 4/8/2024 with continued good performance status.  He had noticed some degree of food sticking when he was eating midesophagus.  He has previously undergone EGD with no abnormalities and previous imaging for cardiac calcium scoring had led to an assessment by cardiology and institution of low-dose Crestor therapy.    The patient is next seen 10/10/2024 indicating that he was reviewed by GI and placed on PPI therapy which has helped reflux symptoms.  He is otherwise feeling well with no significant changes per symptoms.      Plan:  *Return to laboratory today for repeat RUBÉN, PE, free serum light chains    *Follow-up 6 months CBC, CMP, MD

## 2024-10-11 LAB
ALBUMIN SERPL ELPH-MCNC: 4.4 G/DL (ref 2.9–4.4)
ALBUMIN/GLOB SERPL: 2.4 {RATIO} (ref 0.7–1.7)
ALPHA1 GLOB SERPL ELPH-MCNC: 0.2 G/DL (ref 0–0.4)
ALPHA2 GLOB SERPL ELPH-MCNC: 0.5 G/DL (ref 0.4–1)
B-GLOBULIN SERPL ELPH-MCNC: 0.7 G/DL (ref 0.7–1.3)
GAMMA GLOB SERPL ELPH-MCNC: 0.5 G/DL (ref 0.4–1.8)
GLOBULIN SER-MCNC: 1.9 G/DL (ref 2.2–3.9)
IGA SERPL-MCNC: 86 MG/DL (ref 61–437)
IGG SERPL-MCNC: 675 MG/DL (ref 603–1613)
IGM SERPL-MCNC: 35 MG/DL (ref 20–172)
INTERPRETATION SERPL IEP-IMP: ABNORMAL
KAPPA LC FREE SER-MCNC: 14.1 MG/L (ref 3.3–19.4)
KAPPA LC FREE/LAMBDA FREE SER: 1.06 {RATIO} (ref 0.26–1.65)
LABORATORY COMMENT REPORT: ABNORMAL
LAMBDA LC FREE SERPL-MCNC: 13.3 MG/L (ref 5.7–26.3)
M PROTEIN SERPL ELPH-MCNC: ABNORMAL G/DL
PROT SERPL-MCNC: 6.3 G/DL (ref 6–8.5)

## 2024-10-30 RX ORDER — ROSUVASTATIN CALCIUM 10 MG/1
5 TABLET, COATED ORAL DAILY
Qty: 90 TABLET
Start: 2024-10-30 | End: 2024-10-30 | Stop reason: SDUPTHER

## 2024-10-30 RX ORDER — ROSUVASTATIN CALCIUM 5 MG/1
5 TABLET, COATED ORAL DAILY
Qty: 90 TABLET | Refills: 3 | Status: SHIPPED | OUTPATIENT
Start: 2024-10-30

## 2024-10-30 NOTE — TELEPHONE ENCOUNTER
Can you please ask patient if he has had a fasting lipid panel (cholesterol panel) checked since decreasing cholesterol medication.  If not, I can order lab.  I thought he was having lab obtained a few months later by his PCP, but I don't see it.

## 2024-10-30 NOTE — TELEPHONE ENCOUNTER
Solo called back but had to leave him a message.    His most recents labs done on 6/24/24 with Dr. Coy can be found under the 7/11/24 visit Continuity of Care document.    Dr Majano also reviewed these and wrote the patient on his 7/8/24 Patient message. He also agreed with the Rosuvastatin 5mg dose. His Lipid Panel and Liver function results were normal, so I sent in the refill.    Steffanie

## 2024-10-30 NOTE — TELEPHONE ENCOUNTER
Protocol Failed     NOV 5/1/2025 (CS)  JUDD 4/25/2024 (KN)       Plan:       Coronary artery calcifications/mildly elevated LDL and LDL P: LDL improved with low-dose Crestor.     Hypertension: Controlled.  Managed by primary     Mr. Morales will follow with Dr. Majano on an annual basis.

## 2025-03-28 ENCOUNTER — TELEPHONE (OUTPATIENT)
Dept: ONCOLOGY | Facility: CLINIC | Age: 65
End: 2025-03-28

## 2025-03-28 NOTE — TELEPHONE ENCOUNTER
[facility] received a fax that requires your attention. The document has been indexed to the patient’s chart for your review.      Reason for sending: RECEIVED AND INDEXED RECORDS    Documents Description: LABS INDEXED AS EXTERNAL 03/28/25, 03/20/25.> INDEXED IN CHART     Name of Sender: [facility] [phone]    Date Indexed: 03/28/25    Notes (if needed): THANKS!

## 2025-04-23 NOTE — PROGRESS NOTES
REASONS FOR FOLLOWUP:    Presentation with thrombocytopenia, mild leukopenia and splenomegaly.   Diagnosis hairy cell leukemia.   Status post hospitalization 02/06/2012-02/15/2012 per treatment with cladribine (2CdA x 7 days continuous IV infusion).   Evidence of CR noted 05/09/2012 with interval resolution of splenomegaly, notable on physical exam and peripheral blood smear.   Patien t with continued CR noted 09/05/2012, every 3 month assessment to 1 year anniversary planned, every 4 months 2nd year, every 6 months years 3-5.   Patient seen 03/05/2013 stable, with plans to move to every 4 month assessment. Patient proceeding through t reatment with Suboxone.   The patient was seen on 10/25/2013, stable hematologically and reassessment q.6 months planned.   The patient was seen 04/10/2014, status post recent apparent viral illness with normalization of peripheral blood counts and symptoms.   Patient seen 09/26/2014, stable - continued remission noted.   Patient seen on 03/17/2015, stable in continued remission, every 6 month assessment planned.   Patient seen every 6 months to yearly without evidence recurrence-reviewed December 2018  Hospitalization December 19-December 24-acute respiratory failure with hypoxia, metapneumovirus  Patient assessed May 27, 2020, no additional respiratory issues.  Plans made to reassess immunoglobulin levels and supplement as needed  Patient reassessed August 2020 with acceptable immunoglobulin levels.  Patient assessed November 16, 2020, stable, reexamination prior to [place] stay in December 2020  Patient assessed December 21, 2020, acceptable immunoglobulin levels, [facility] x2 months planned  Patient reassessed 4/26/2021 with no infectious complications.  Patient reviewed 4/21/2022 again without infectious complications, status post Evusheld  Patient seen 5/4/2023, no infectious complications, patient  urged to follow-up bivalent COVID-19 vaccination  Patient seen 11/1/2023, stable with mild evidence of cytopenias.  Evaluated 4/18/2024, no cytopenias, cardiovascular assessment ongoing  Evaluated 10/10/2024 stable, mild cytopenias?  Postviral illness  Similar findings with further leukopenia and thrombocytopenia assessed 4/24/2025, careful follow-up planned                         History of Present Illness         The patient is a 64-year-old male who has been previously healthy. He had had some routine blood work performed recently with Dr. Sinclair on 01/17/2012 showing significant thrombocytopenia with a platelet count of 79,000. His white count was low normal at 4700 with decreased granulocytes at 31% with lymphocytes elevated at 55%. His serum chemistries at the time were unremarkable including normal liver function tests. Mr. Morales had been feeling reasonably w ell although he reported some fatigue. He has had no fevers, chills, night sweats or unexplained weight loss. He has been on a weight loss program and following a low carb diet and has intentionally lost about 40 pounds in the last year or two. He had noted some easier bruising but had been taking aspirin and fish oil even prior to noticing his platelet count was low.   On his examination in the office 01/31/2012 the patient had significant splenomegaly with spleen tip palpable approximately 7-8 cm below the left costal margin. The spleen is not particularly tender and did not extend to midline. Peripheral smear showed large platelets and atypical lymphocytes with no blasts. Mr. Morales was assessed by flow cytometry peripheral blood showing androgeni c profile consistent with hairy cell leukemia. Bone marrow aspirate and biopsy also was confirmatory for hairy cell leukemia with normal chromosomal complement. 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 Additionally CT abdomen  and pelvis demonstrated splenomegaly with spleen measuring 22 cm in craniocaudal extent up to 16.7 x 8 in the axial plane. No other abnormalities were present. After discussion the patient was advised per linda osborn with 2CdA. He was admitted 02/06/2015-02/15/2012 with a dual lumen PICC line placed. 2CdA began after initial hydration and continued over the subsequent 7 days by continuous IV infusion. He did relatively well except for weakness and fatigue. He ha d additional issues with constipation and exacerbation of chronic low back pain. He further had development of neutropenia 02/12/2012 and was placed on Diflucan, Valtrex and Levaquin. These he also tolerated well. He also required transfusion just prior to discharge. Finally the patient was asked to return 02/16/2012 for Neulasta which he did return back to take in the office. He subsequently has done relatively well as an outpatient with white count increasing to 13,000 by 02/20/2012 and peaking at 20 , 000 by 02/24/2012. Platelet count also improved substantially peaking 02/23/2012 at 177,000. He has otherwise returned and we were able to remove his PICC line by 02/24/2012 and on 02/27/2012 continued resolution of his leukopenia, antiviral and antibac t erial medication discontinued. The patient now returns today feeling weak in the morning but otherwise doing well for the rest of the day without any fevers, chills, and resolution of night sweats. Appetite, weight and performance status have remained o verall excellent. He has an episode of gouty arthropathy involving his left great toe. He used Indocin briefly and then went back to allopurinol but is now having his gout return this morning.   The patient thereafter did use Indocin successful and thereafter restarted allopurinol. His gouty arthropathy responded appropriately and has not returned. Followup counts were requested and as he returns today Mr. Morales is feeling well. He has returne d to  exercise and nearly to his full-time job. He still notes some degree of fatigue in the a.m. and by the early afternoon approximately 12:30-1:00 p.m. He is at work full-time. He has had no fever, chills, weight loss, night sweats, nausea or vomiting, c hange in bowel habit.   The patient was asked to return back for followup reassessment. CT scan 05/02/2012 per splenomegaly showed spleen to have regressed substantially in size. The AP in 1st dimension previously 16.7 x 8 is now 12.2 by 6.1. No other abno rmalities were present. Peripheral smear also shows no evidence of recurrent disease. The patient feels well and has returned to normal activity.   The patient was asked to return for followup now seen 09/05/2012 doing well with normal activity again with considerable improvement in his general performance status.   The patient is now seen 03/05/2013 continuing to do well. It came to the attention however through a [facility] report that he is current on Suboxone through Dr. Kalia Davidson. In discussion with the patient he evidently had difficulty in trying to discontinue pain medications in the spring of 2012. He eventually saw Dr. Davidson who was hoping to wean him altogether from pain medications with the use of Suboxone. 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 He does this primarily through diet at present.   The patient thereafter was asked to return in followup. He is now seen on 10/25/2013 feeling well overall, continuing with exercise and dietary program. He feels well overall except for mild headache, approximately at 3:00-4:00 p.m. each day(?).   The patient thereafter was asked to be seen back 6 months later . He is now seen 09/26/2014 doing well continuing his exercise and dietary program to terrific effect. He has had no issues since last seen and in fact has  normalized his hypertension as a result of his dietary program.   The patient thereafter was asked to be seen 6 months later. He continues to do his exercise and dietary program to wonderful effect. He has had no additional issues since last seen.   The patient is now seen back 6 months later, 10/13/2015, and fortunately does continue to do amazingly well. He has maintained his exercise and dietary program, again to excellent and continued significant effect.   Patient now reviewed August 04, 2016.  He continues to feel well except for periodic fatigue.    The patient is reviewed December 04, 2017.  Hematologically he is stable and remains in remission.  Plans are to eventually discontinue Suboxone as well.    Patient is next seen December 13, 2018.  He continues to exercise regularly and feels generally good though has been concerned about possibly splenic discomfort.  He also describes that his mother is been diagnosed with neuroendocrine carcinoma involving the bowel and is undergoing treatment up to and including immunotherapy.    The patient, unfortunately, required hospitalization December 19 through December 24 having been admitted with pneumonia and bilateral infiltrates associate with acute respiratory failure.  His studies went on to reveal evidence of metapneumovirus infection and, again, he is felt to have pneumonia with immunocompromise state, treated with broad-spectrum antibiotics and ICU management.  This included IV steroids and additional assessment included an IgG reduced at 497, IgA 104, IgM 31 and IgA 58.  As result he was given IV immunoglobulin at 400 mg/kg.   Fortunately he went on to slowly and steadily improve able to be switched to oral antibiotic therapies, tapering steroids and was able to be discharged home.  Chest x-ray December 23 prior to discharge included lungs now that were expanded with marked improvement in previous extensive bilateral infiltrates, cardiomegaly also  noted.  Patient is next seen January 17, 2020 markedly improved and nearly back to normal activity.  We discussed immunoglobulin reassessment and possibly prophylaxis.  The patient is next seen May 27, 2020 and continues to feel well.  He has had no additional infectious complications and peripheral smear is normal when seen in office today.  At the time of this dictation his immunoglobulins have returned as consistent with IgA of 85, IgG of 614 and IgM of 26.  At this point additional supplementation is not necessary but may become important as we move into the fall.    We had the patient return for follow-up assessing him for immunoglobulin levels August 17 with IgG of 590, IgA of 90, IgM 27, no monoclonality.  IV immunoglobulin was nonapproved as result.    He is now contacted by telephone and he is feeling well. We discussed his current immunnoglobulin levels that do not warrant prophylactic IVIG at this point.  We would want to assess this in November just prior to a planned 2-month stay in [place].     The patient is next seen November 16, 2020 doing well though he now plans his trip to [place] in December.  We discussed reassessment prior to that trip.  Patient returns for repeat laboratory studies with quantitative hemoglobins obtained December 14 include an IgA of 78, IgG 567 and IgM of 31.  These are adequate and will not require infusions prior to stay in [place].  Fortunately he is feeling well and we discussed that he would be a candidate for COVID-19 vaccines as they become available.  The patient went on to be vaccinated and thereafter wintered in [place] for approximately 2 months. He is next reassessed 4/26/2021 having had no infectious complications and with a normal peripheral blood smear.    After discussion the patient was given Evusheld 3/21/2022.  He did well with this injection of his neck seen back 4/21/2022 continue to feel reasonably well but concerned about his general pulmonary reserve  as he tries to continue running.  He had been released by pulmonary medicine after his previous viral pneumonitis several years ago.    Patient reevaluated 5/4/2023.  He has been having additional issues, function, no infectious complications.  This includes lack of infections from his grandchildren as he participates in their care.  We discussed the COVID-19 vaccination is warranted.    The patient is next seen back 10/31/2023.  He had been reviewed by cardiology with his LDL in the 70-80 range and the patient undergoing coronary artery calcium score elevated to 57 with majority being in the LAD and RCA.  Recommendations 10/16/2023 included starting low-dose Crestor therapy continuing antihypertensive meds with the patient's coronary arterial calcification score around 11%.  The patient when seen is feeling quite well.  He is developing mild cytopenias and will be checked every 3 month intervals.    The patient is next seen 4/8/2024 with continued good performance status.  He had noticed some degree of food sticking when he was eating midesophagus.  He has previously undergone EGD with no abnormalities and previous imaging for cardiac calcium scoring had led to an assessment by cardiology and institution of low-dose Crestor therapy.    The patient is next seen 10/10/2024 indicating that he was reviewed by GI and placed on PPI therapy which has helped reflux symptoms.  He is otherwise feeling well with no significant changes per symptoms.  The patient is reviewed next 4/20/25 generally feeling well with follow-up CBC demonstrating further reduction for white count now 2740, platelet count 102,000.  These are modest changes suggesting the closer follow-up is now necessary.    Past Medical History:   Diagnosis Date    Asthma     Atrial fibrillation     Cancer     hairy cell leukemia    Coronary artery disease     minimal with some minimal narrowing of 1 vessel    ED (erectile dysfunction) of organic origin     H/O  vitamin D deficiency     Hypertension     Night sweats     Pneumonia     Thrombocytopenia        ONCOLOGIC HISTORY:  (History from previous dates can be found in the separate document.)    Current Outpatient Medications on File Prior to Visit   Medication Sig Dispense Refill    albuterol sulfate  (90 Base) MCG/ACT inhaler Inhale 2 puffs Every 4 (Four) Hours As Needed for Wheezing. 1 inhaler 3    buprenorphine-naloxone (SUBOXONE) 8-2 MG per SL tablet TAKE 2 TABLETS UNDER TONGUE EVERY MORNING CS EXPRESS SC HL  0    losartan (COZAAR) 100 MG tablet       PreviDent 5000 Booster Plus 1.1 % paste See Admin Instructions. follow package directions      rosuvastatin (CRESTOR) 5 MG tablet Take 1 tablet by mouth Daily. 90 tablet 3    senna (SENOKOT) 8.6 MG tablet Take 1 tablet by mouth As Needed for Constipation.      sildenafil (REVATIO) 20 MG tablet Take 1 tablet by mouth Daily. 10 tablet 2    Tyrvaya 0.03 MG/ACT solution spray 1 spray into each nostril twice a day      vardenafil (LEVITRA) 20 MG tablet TAKE 1 TABLET BY MOUTH AS NEEDED FOR ERECTILE DYSFUNCTION 20 tablet 3    azithromycin (ZITHROMAX) 250 MG tablet 1 tablet. (Patient not taking: Reported on 4/25/2024)       No current facility-administered medications on file prior to visit.       ALLERGIES:     Allergies   Allergen Reactions    Ciprofloxacin Rash       Social History     Socioeconomic History    Marital status:      Spouse name: Radha   Tobacco Use    Smoking status: Never     Passive exposure: Never    Smokeless tobacco: Never   Vaping Use    Vaping status: Never Used   Substance and Sexual Activity    Alcohol use: Yes     Alcohol/week: 2.0 standard drinks of alcohol     Types: 2 Cans of beer per week     Comment: social    Drug use: No    Sexual activity: Defer         Cancer-related family history includes Colon cancer in his mother.     Review of Systems   Constitutional:  Negative for fatigue.   Respiratory:  Negative for chest tightness  "and shortness of breath.    Gastrointestinal:  Negative for abdominal pain, constipation, diarrhea, nausea and vomiting.   Neurological:  Negative for weakness.   All other systems reviewed and are negative.    A comprehensive 14 point review of systems was performed and was negative except as mentioned.    Objective      Vitals:    04/24/25 1201   BP: 119/73   Pulse: 60   Temp: 97 °F (36.1 °C)   SpO2: 98%   Weight: 70.1 kg (154 lb 8 oz)   Height: 162.6 cm (64.02\")   PainSc: 0-No pain                 4/24/2025    12:02 PM   Current Status   ECOG score 0        Physical Exam    GENERAL: Well-developed, well-nourished  male in no acute distress.   SKIN: Warm, dry without rashes, purpura or petechiae.   HEAD: Normocephalic.   EYES: Pupils equal, round and reactive to light. EOMs intact. Conjunctivae normal.   EARS: Hearing intact.   NOSE: Septum midline. No excoriations or nasal discharge.   MOUTH: Tongue is well-papillated; no stomatitis or ulcers. Lips normal.   THROAT: Oropharynx without lesions or exudates.   NECK: Supple with good range of motion; no thyromegaly or masses, no JVD or bruits.   LYMPHATICS: No cervical, supraclavicular, axillary or inguinal adenopathy.   CHEST: Lungs clear to percussion and auscultation.   CARDIAC: Regular rate and rhythm without murmurs, rubs or gallops.   ABDOMEN: Soft, nontender with no organomegaly or masses.   EXTREMITIES: No clubbing, cyanosis or edema.   NEUROLOGICAL: No focal neurological deficits.    RECENT LABS:  Hematology WBC   Date Value Ref Range Status   04/24/2025 2.74 (L) 3.40 - 10.80 10*3/mm3 Final   02/20/2020 3.85 3.40 - 10.80 10*3/mm3 Final     RBC   Date Value Ref Range Status   04/24/2025 4.44 4.14 - 5.80 10*6/mm3 Final   02/20/2020 4.55 4.14 - 5.80 10*6/mm3 Final     Hemoglobin   Date Value Ref Range Status   04/24/2025 13.3 13.0 - 17.7 g/dL Final     Hematocrit   Date Value Ref Range Status   04/24/2025 38.3 37.5 - 51.0 % Final     Platelets   Date " Value Ref Range Status   04/24/2025 102 (L) 140 - 450 10*3/mm3 Final        Assessment & Plan       A 64-year-old male with a history of hairy cell leukemia. He presented with leukopenia, thrombocytopenia, and splenomegaly. After diagnosis was confirmed he was treated with cladribine 02/06/2012-02/15/2012 by continuous IV infusion. ALLYSON puentes developed cytopenias, but again not to a serious degree and ultimately exam in late February 2012 was negative for evidence of residual and flow cytometric assessment. Subsequent physical examination showed resolution of splenomegaly and normalization o f performance status. Followup CT also normalized as well per splenomegaly. The patient has been followed since and when seen in March 2013 and October 2013, was felt to be in CR. He had been seen just after recent viral illness and though he was feeling poorly, symptoms went on to improve and returned to baseline status. At 6 month review, the patient continues in remission. This further vindicates and since last visit, he has had no additional urinary symptoms either.   At this point we find no evidence of recurrent disease and again feel that Mr. Morales remains in complete remission. This has been the case in 03/17/2015 and again is notable 10/13/2015 as well as April 2016, August 2016 and December 2017 .  The patient was last seen December 13, 2018 doing well without evidence recurrence .       The patient, unfortunately, required hospitalization December 19 through December 24 having been admitted with pneumonia and bilateral infiltrates associate with acute respiratory failure.  His studies went on to reveal evidence of metapneumovirus infection and, again, he is felt to have pneumonia with immunocompromise state, treated with broad-spectrum antibiotics and ICU management.  This included IV steroids and additional assessment included an IgG reduced at 497, IgA 104, IgM 31 and IgA 58.  As result he was given IV immunoglobulin at  400 mg/kg.   Fortunately he went on to slowly and steadily improve able to be switched to oral antibiotic therapies, tapering steroids and was able to be discharged home.  Chest x-ray December 23 prior to discharge included lungs now that were expanded with marked improvement in previous extensive bilateral infiltrates, cardiomegaly also noted.  As he and his wife are seen back January 7, 2020 we have discussed that he should be reassessed per hemoglobin levels including a possible subclass deficiency and be considered for potential prophylaxis with IVIG.  This is not absolute and that he has done well for many years without requiring such infusions but does bear further assessment.     The patient is next seen May 27, 2020 with immunoglobulins remaining relatively stable at above 600 drawn May 18, 2020 for IVIG, at which time subclasses were also acceptable, recheck May 27 with IgG of 614.  At this point supplementation is not necessary that we may review this as the fall approaches.  The patient agreeable to this plan.   The patient is reassessed August 24 2020 with repeat hemoglobin levels that are at the lower end of normal for IgG.  You will not need immunoglobulin at this point.  He and his wife do plan a trip to [place] likely in December.  We will check him prior to that.  The patient is reassessed November 6, 2020 with IgG of 582, IgM of 27 and IgA of 87.  He was doing well we asked him to be reassessed December 15 for his quantitative immunoglobins prior to a stay in [place].  Fortunately these are found to be adequate and will not need to be supplemented.     Patient is reassessed 4/26/2021 doing well without any evidence of progression or recurrence of his hairy cell leukemia and, fortunately, no infectious complications.  This is again the case and the patient was given Evusheld 3/21/2022.  As he is seen 4/21/2022 his general performance status is excellent we discussed follow-up including pulmonary  review as needed.    Patient reevaluated 5/4/2023.  He has been having additional issues, function, no infectious complications.  This includes lack of infections from his grandchildren as he participates in their care.  We discussed the COVID-19 vaccination is warranted.  Additional recent studies include acceptable CBC, paraprotein studies with no monoclonality, IgG of 586, IgA 95, IgM 36.    The patient is next seen back 10/31/2023.  He had been reviewed by cardiology with his LDL in the 70-80 range and the patient undergoing coronary artery calcium score elevated to 57 with majority being in the LAD and RCA.  Recommendations 10/16/2023 included starting low-dose Crestor therapy continuing antihypertensive meds with the patient's coronary arterial calcification score around 11%.  The patient when seen is feeling quite well.  He is developing mild cytopenias and will be checked every 3 month intervals.      The patient is next seen 4/8/2024 with continued good performance status.  He had noticed some degree of food sticking when he was eating midesophagus.  He has previously undergone EGD with no abnormalities and previous imaging for cardiac calcium scoring had led to an assessment by cardiology and institution of low-dose Crestor therapy.    The patient is next seen 10/10/2024 indicating that he was reviewed by GI and placed on PPI therapy which has helped reflux symptoms.  He is otherwise feeling well with no significant changes per symptoms.    The patient is reviewed next 4/20/25 generally feeling well with follow-up CBC demonstrating further reduction for white count now 2740, platelet count 102,000.  These are modest changes suggesting the closer follow-up is now necessary.      Plan:  *6 weeks-CBC, iron evaluation, LDH    *12 weeks MD, CBC, LDH

## 2025-04-24 ENCOUNTER — OFFICE VISIT (OUTPATIENT)
Dept: ONCOLOGY | Facility: CLINIC | Age: 65
End: 2025-04-24
Payer: MEDICARE

## 2025-04-24 ENCOUNTER — LAB (OUTPATIENT)
Dept: LAB | Facility: HOSPITAL | Age: 65
End: 2025-04-24
Payer: MEDICARE

## 2025-04-24 VITALS
HEIGHT: 64 IN | SYSTOLIC BLOOD PRESSURE: 119 MMHG | DIASTOLIC BLOOD PRESSURE: 73 MMHG | OXYGEN SATURATION: 98 % | TEMPERATURE: 97 F | HEART RATE: 60 BPM | WEIGHT: 154.5 LBS | BODY MASS INDEX: 26.38 KG/M2

## 2025-04-24 DIAGNOSIS — C91.41 HAIRY CELL LEUKEMIA, IN REMISSION: Primary | ICD-10-CM

## 2025-04-24 DIAGNOSIS — C91.41 HAIRY CELL LEUKEMIA, IN REMISSION: ICD-10-CM

## 2025-04-24 LAB
ALBUMIN SERPL-MCNC: 4.7 G/DL (ref 3.5–5.2)
ALBUMIN/GLOB SERPL: 2.6 G/DL
ALP SERPL-CCNC: 35 U/L (ref 39–117)
ALT SERPL W P-5'-P-CCNC: 28 U/L (ref 1–41)
ANION GAP SERPL CALCULATED.3IONS-SCNC: 11 MMOL/L (ref 5–15)
AST SERPL-CCNC: 30 U/L (ref 1–40)
BASOPHILS # BLD AUTO: 0.01 10*3/MM3 (ref 0–0.2)
BASOPHILS NFR BLD AUTO: 0.4 % (ref 0–1.5)
BILIRUB SERPL-MCNC: 0.8 MG/DL (ref 0–1.2)
BUN SERPL-MCNC: 26 MG/DL (ref 8–23)
BUN/CREAT SERPL: 20.8 (ref 7–25)
CALCIUM SPEC-SCNC: 9.7 MG/DL (ref 8.6–10.5)
CHLORIDE SERPL-SCNC: 105 MMOL/L (ref 98–107)
CO2 SERPL-SCNC: 25 MMOL/L (ref 22–29)
CREAT SERPL-MCNC: 1.25 MG/DL (ref 0.76–1.27)
DEPRECATED RDW RBC AUTO: 39.7 FL (ref 37–54)
EGFRCR SERPLBLD CKD-EPI 2021: 63.9 ML/MIN/1.73
EOSINOPHIL # BLD AUTO: 0.03 10*3/MM3 (ref 0–0.4)
EOSINOPHIL NFR BLD AUTO: 1.1 % (ref 0.3–6.2)
ERYTHROCYTE [DISTWIDTH] IN BLOOD BY AUTOMATED COUNT: 12.6 % (ref 12.3–15.4)
GLOBULIN UR ELPH-MCNC: 1.8 GM/DL
GLUCOSE SERPL-MCNC: 120 MG/DL (ref 65–99)
HCT VFR BLD AUTO: 38.3 % (ref 37.5–51)
HGB BLD-MCNC: 13.3 G/DL (ref 13–17.7)
IMM GRANULOCYTES # BLD AUTO: 0 10*3/MM3 (ref 0–0.05)
IMM GRANULOCYTES NFR BLD AUTO: 0 % (ref 0–0.5)
LDH SERPL-CCNC: 151 U/L (ref 135–225)
LYMPHOCYTES # BLD AUTO: 0.55 10*3/MM3 (ref 0.7–3.1)
LYMPHOCYTES NFR BLD AUTO: 20.1 % (ref 19.6–45.3)
MCH RBC QN AUTO: 30 PG (ref 26.6–33)
MCHC RBC AUTO-ENTMCNC: 34.7 G/DL (ref 31.5–35.7)
MCV RBC AUTO: 86.3 FL (ref 79–97)
MONOCYTES # BLD AUTO: 0.13 10*3/MM3 (ref 0.1–0.9)
MONOCYTES NFR BLD AUTO: 4.7 % (ref 5–12)
NEUTROPHILS NFR BLD AUTO: 2.02 10*3/MM3 (ref 1.7–7)
NEUTROPHILS NFR BLD AUTO: 73.7 % (ref 42.7–76)
NRBC BLD AUTO-RTO: 0 /100 WBC (ref 0–0.2)
PLATELET # BLD AUTO: 102 10*3/MM3 (ref 140–450)
PMV BLD AUTO: 10 FL (ref 6–12)
POTASSIUM SERPL-SCNC: 4.7 MMOL/L (ref 3.5–5.2)
PROT SERPL-MCNC: 6.5 G/DL (ref 6–8.5)
RBC # BLD AUTO: 4.44 10*6/MM3 (ref 4.14–5.8)
SODIUM SERPL-SCNC: 141 MMOL/L (ref 136–145)
WBC NRBC COR # BLD AUTO: 2.74 10*3/MM3 (ref 3.4–10.8)

## 2025-04-24 PROCEDURE — 85025 COMPLETE CBC W/AUTO DIFF WBC: CPT

## 2025-04-24 PROCEDURE — 80053 COMPREHEN METABOLIC PANEL: CPT

## 2025-04-24 PROCEDURE — 3078F DIAST BP <80 MM HG: CPT | Performed by: INTERNAL MEDICINE

## 2025-04-24 PROCEDURE — 83615 LACTATE (LD) (LDH) ENZYME: CPT

## 2025-04-24 PROCEDURE — 1126F AMNT PAIN NOTED NONE PRSNT: CPT | Performed by: INTERNAL MEDICINE

## 2025-04-24 PROCEDURE — 36415 COLL VENOUS BLD VENIPUNCTURE: CPT

## 2025-04-24 PROCEDURE — 3074F SYST BP LT 130 MM HG: CPT | Performed by: INTERNAL MEDICINE

## 2025-04-24 PROCEDURE — 99214 OFFICE O/P EST MOD 30 MIN: CPT | Performed by: INTERNAL MEDICINE

## 2025-05-01 ENCOUNTER — OFFICE VISIT (OUTPATIENT)
Age: 65
End: 2025-05-01
Payer: MEDICARE

## 2025-05-01 VITALS
BODY MASS INDEX: 26.63 KG/M2 | OXYGEN SATURATION: 98 % | SYSTOLIC BLOOD PRESSURE: 136 MMHG | HEART RATE: 59 BPM | DIASTOLIC BLOOD PRESSURE: 84 MMHG | WEIGHT: 156 LBS | HEIGHT: 64 IN

## 2025-05-01 DIAGNOSIS — I25.10 CORONARY ARTERY CALCIFICATION: Primary | ICD-10-CM

## 2025-05-01 DIAGNOSIS — E78.5 HYPERLIPIDEMIA LDL GOAL <70: ICD-10-CM

## 2025-05-01 DIAGNOSIS — I15.9 SECONDARY HYPERTENSION: ICD-10-CM

## 2025-05-01 PROCEDURE — 99214 OFFICE O/P EST MOD 30 MIN: CPT | Performed by: INTERNAL MEDICINE

## 2025-05-01 PROCEDURE — 3075F SYST BP GE 130 - 139MM HG: CPT | Performed by: INTERNAL MEDICINE

## 2025-05-01 PROCEDURE — 3079F DIAST BP 80-89 MM HG: CPT | Performed by: INTERNAL MEDICINE

## 2025-05-01 PROCEDURE — 93000 ELECTROCARDIOGRAM COMPLETE: CPT | Performed by: INTERNAL MEDICINE

## 2025-05-01 RX ORDER — TADALAFIL 20 MG/1
1 TABLET ORAL DAILY
COMMUNITY
Start: 2025-03-28

## 2025-05-01 RX ORDER — METRONIDAZOLE 500 MG/1
1 TABLET ORAL EVERY 8 HOURS
COMMUNITY

## 2025-05-01 NOTE — PROGRESS NOTES
Date of Office Visit: 2024  Encounter Provider: Emeterio Majano MD  Place of Service: [facility] CARDIOLOGY  Patient Name: Solo Morales  :1960    Chief Complaint   Patient presents with    Follow-up    Coronary Artery Disease   : coronary calcification    HPI: Solo Morales is a 64 y.o. male with a medical history of hairy cell leukemia, mild coronary artery disease disease documented on prior heart cath performed in , essential hypertension and coronary artery calcification. He did undergo coronary artery calcium score 2023 which was elevated 257 with majority of his calcification being in the LAD and RCA.  He was placed on low-dose Crestor therapy and actually is moved down to 5 mg p.o. daily on that.  He had follow-up lipid panel with excellent control on that low-dose.  He denies any chest pain or dyspnea on exertion.  He continues to stay very active.      Previous testing and notes have been reviewed by me.   Past Medical History:   Diagnosis Date    Asthma     Atrial fibrillation     Cancer     hairy cell leukemia    Coronary artery disease     minimal with some minimal narrowing of 1 vessel    ED (erectile dysfunction) of organic origin     H/O vitamin D deficiency     Hypertension     Night sweats     Pneumonia     Thrombocytopenia        Past Surgical History:   Procedure Laterality Date    CARDIAC CATHETERIZATION  2004    no stents, [facility], Non Obstructive CAD    COLONOSCOPY      two benign polyps removed    DENTAL PROCEDURE      Dental implants    VASECTOMY         Social History     Socioeconomic History    Marital status:      Spouse name: Radha   Tobacco Use    Smoking status: Never     Passive exposure: Never    Smokeless tobacco: Never   Vaping Use    Vaping status: Never Used   Substance and Sexual Activity    Alcohol use: Yes     Alcohol/week: 2.0 standard drinks of alcohol     Types: 2 Cans of beer per week      Comment: social    Drug use: No    Sexual activity: Defer       Family History   Problem Relation Age of Onset    Diabetes Other     Hypertension Other     Colon cancer Mother        Review of Systems   Constitutional: Negative.   HENT: Negative.     Eyes: Negative.    Cardiovascular: Negative.    Respiratory: Negative.     Endocrine: Negative.    Hematologic/Lymphatic: Negative.    Skin: Negative.    Musculoskeletal: Negative.    Gastrointestinal: Negative.    Genitourinary: Negative.    Neurological: Negative.    Psychiatric/Behavioral: Negative.     Allergic/Immunologic: Negative.        Allergies   Allergen Reactions    Ciprofloxacin Rash         Current Outpatient Medications:     albuterol sulfate  (90 Base) MCG/ACT inhaler, Inhale 2 puffs Every 4 (Four) Hours As Needed for Wheezing., Disp: 1 inhaler, Rfl: 3    buprenorphine-naloxone (SUBOXONE) 8-2 MG per SL tablet, TAKE 2 TABLETS UNDER TONGUE EVERY MORNING CS EXPRESS SC HL, Disp: , Rfl: 0    losartan (COZAAR) 100 MG tablet, , Disp: , Rfl:     metroNIDAZOLE (FLAGYL) 500 MG tablet, Take 1 tablet by mouth Every 8 (Eight) Hours., Disp: , Rfl:     PreviDent 5000 Booster Plus 1.1 % paste, See Admin Instructions. follow package directions, Disp: , Rfl:     rosuvastatin (CRESTOR) 5 MG tablet, Take 1 tablet by mouth Daily., Disp: 90 tablet, Rfl: 3    senna (SENOKOT) 8.6 MG tablet, Take 1 tablet by mouth As Needed for Constipation., Disp: , Rfl:     sildenafil (REVATIO) 20 MG tablet, Take 1 tablet by mouth Daily., Disp: 10 tablet, Rfl: 2    tadalafil (CIALIS) 20 MG tablet, Take 1 tablet by mouth Daily., Disp: , Rfl:     Tyrvaya 0.03 MG/ACT solution, spray 1 spray into each nostril twice a day, Disp: , Rfl:     vardenafil (LEVITRA) 20 MG tablet, TAKE 1 TABLET BY MOUTH AS NEEDED FOR ERECTILE DYSFUNCTION, Disp: 20 tablet, Rfl: 3    azithromycin (ZITHROMAX) 250 MG tablet, 1 tablet. (Patient not taking: Reported on 4/25/2024), Disp: , Rfl:       Objective:      There were no vitals filed for this visit.  There is no height or weight on file to calculate BMI.    CT coronary calcium score 08/15/2023::  Total calcium score 257    Circumflex 21      EKG Treadmill Stress test 02/20/2020:  Stress Findings: No ECG evidence of myocardial ischemia  Negative clinical evidence of myocardial ischemia. Findings consistent with a normal ECG stress test    2D Echocardiogram 12/21/2019:  Left ventricular systolic function is normal. Calculated EF = 62%. Estimated EF was in agreement with the calculated EF. Estimated EF = 62%. Normal left ventricular cavity size and wall thickness noted. All left ventricular wall segments contract normally. Left ventricular diastolic function is normal.  Mild MAC is present. Trace mitral valve regurgitation is present  Mild tricuspid valve regurgitation is present. Estimated right ventricular systolic pressure from tricuspid regurgitation is mildly elevated (35-45 mmHg). Calculated right ventricular systolic pressure from tricuspid regurgitation is 39 mmHg.     PHYSICAL EXAM:    Constitutional:       Appearance: Healthy appearance. Not in distress.   Neck:      Vascular: No JVR. JVD normal.   Pulmonary:      Effort: Pulmonary effort is normal.      Breath sounds: Normal breath sounds. No wheezing. No rhonchi. No rales.   Chest:      Chest wall: Not tender to palpatation.   Cardiovascular:      PMI at left midclavicular line. Normal rate. Regular rhythm. Normal S1. Normal S2.       Murmurs: There is no murmur.      No gallop.  No click. No rub.   Pulses:     Intact distal pulses.   Edema:     Peripheral edema absent.   Abdominal:      General: Bowel sounds are normal.      Palpations: Abdomen is soft.      Tenderness: There is no abdominal tenderness.   Musculoskeletal: Normal range of motion.         General: No tenderness. Skin:     General: Skin is warm and dry.   Neurological:      General: No focal deficit present.      Mental Status:  Alert and oriented to person, place and time.           ECG 12 Lead    Date/Time: 5/1/2025 12:04 PM  Performed by: Emeterio Majano MD    Authorized by: Emeterio Majano MD  Comparison: compared with previous ECG from 4/25/2024  Similar to previous ECG  Rhythm: sinus rhythm  Rate: normal  QRS axis: normal    Clinical impression: normal ECG            Assessment:     Plan:       Coronary artery calcifications/mildly elevated LDL and LDL P:  -He continues on low-dose Crestor therapy and is tolerating this well.  Denies myalgias.  LDL is very well-controlled.  No change in current therapy.  He continues to stay active without any chest pain or CHAVARRIA.    Hypertension: Managed by primary.  Continues on losartan 100 mg p.o. daily and is tolerating this well.  No issues with renal insufficiency or hyperkalemia.       Your medication list            Accurate as of May 1, 2025 11:19 AM. If you have any questions, ask your nurse or doctor.                CONTINUE taking these medications        Instructions Last Dose Given Next Dose Due   albuterol sulfate  (90 Base) MCG/ACT inhaler  Commonly known as: PROVENTIL HFA;VENTOLIN HFA;PROAIR HFA      Inhale 2 puffs Every 4 (Four) Hours As Needed for Wheezing.       azithromycin 250 MG tablet  Commonly known as: ZITHROMAX      1 tablet.       buprenorphine-naloxone 8-2 MG per SL tablet  Commonly known as: SUBOXONE      TAKE 2 TABLETS UNDER TONGUE EVERY MORNING CS EXPRESS SC HL       losartan 100 MG tablet  Commonly known as: COZAAR           metroNIDAZOLE 500 MG tablet  Commonly known as: FLAGYL      Take 1 tablet by mouth Every 8 (Eight) Hours.       PreviDent 5000 Booster Plus 1.1 % paste  Generic drug: Sodium Fluoride      See Admin Instructions. follow package directions       rosuvastatin 5 MG tablet  Commonly known as: CRESTOR      Take 1 tablet by mouth Daily.       senna 8.6 MG tablet  Commonly known as: SENOKOT      Take 1 tablet by mouth As Needed for  Constipation.       sildenafil 20 MG tablet  Commonly known as: REVATIO      Take 1 tablet by mouth Daily.       tadalafil 20 MG tablet  Commonly known as: CIALIS      Take 1 tablet by mouth Daily.       Tyrvaya 0.03 MG/ACT solution  Generic drug: Varenicline Tartrate      spray 1 spray into each nostril twice a day       vardenafil 20 MG tablet  Commonly known as: LEVITRA      TAKE 1 TABLET BY MOUTH AS NEEDED FOR ERECTILE DYSFUNCTION

## 2025-05-16 ENCOUNTER — TELEPHONE (OUTPATIENT)
Dept: ONCOLOGY | Facility: CLINIC | Age: 65
End: 2025-05-16
Payer: MEDICARE

## 2025-05-16 NOTE — TELEPHONE ENCOUNTER
Caller: Solo oMrales    Relationship to patient: Self    Best call back number: [phone]    Chief complaint: CANC. - R/S     Type of visit: LAB & F/U 1    Requested date: 7/14 OR 7/15     If rescheduling, when is the original appointment: 7/22/25

## 2025-06-10 ENCOUNTER — LAB (OUTPATIENT)
Dept: LAB | Facility: HOSPITAL | Age: 65
End: 2025-06-10
Payer: MEDICARE

## 2025-06-10 ENCOUNTER — CLINICAL SUPPORT (OUTPATIENT)
Dept: ONCOLOGY | Facility: HOSPITAL | Age: 65
End: 2025-06-10
Payer: MEDICARE

## 2025-06-10 DIAGNOSIS — C91.41 HAIRY CELL LEUKEMIA, IN REMISSION: ICD-10-CM

## 2025-06-10 LAB
BASOPHILS # BLD AUTO: 0.01 10*3/MM3 (ref 0–0.2)
BASOPHILS NFR BLD AUTO: 0.3 % (ref 0–1.5)
DEPRECATED RDW RBC AUTO: 40.7 FL (ref 37–54)
EOSINOPHIL # BLD AUTO: 0.05 10*3/MM3 (ref 0–0.4)
EOSINOPHIL NFR BLD AUTO: 1.5 % (ref 0.3–6.2)
ERYTHROCYTE [DISTWIDTH] IN BLOOD BY AUTOMATED COUNT: 12.6 % (ref 12.3–15.4)
HCT VFR BLD AUTO: 38.4 % (ref 37.5–51)
HGB BLD-MCNC: 12.9 G/DL (ref 13–17.7)
IMM GRANULOCYTES # BLD AUTO: 0.01 10*3/MM3 (ref 0–0.05)
IMM GRANULOCYTES NFR BLD AUTO: 0.3 % (ref 0–0.5)
LDH SERPL-CCNC: 151 U/L (ref 135–225)
LYMPHOCYTES # BLD AUTO: 0.66 10*3/MM3 (ref 0.7–3.1)
LYMPHOCYTES NFR BLD AUTO: 20.2 % (ref 19.6–45.3)
MCH RBC QN AUTO: 29.5 PG (ref 26.6–33)
MCHC RBC AUTO-ENTMCNC: 33.6 G/DL (ref 31.5–35.7)
MCV RBC AUTO: 87.9 FL (ref 79–97)
MONOCYTES # BLD AUTO: 0.1 10*3/MM3 (ref 0.1–0.9)
MONOCYTES NFR BLD AUTO: 3.1 % (ref 5–12)
NEUTROPHILS NFR BLD AUTO: 2.44 10*3/MM3 (ref 1.7–7)
NEUTROPHILS NFR BLD AUTO: 74.6 % (ref 42.7–76)
NRBC BLD AUTO-RTO: 0 /100 WBC (ref 0–0.2)
PLATELET # BLD AUTO: 107 10*3/MM3 (ref 140–450)
PMV BLD AUTO: 9.4 FL (ref 6–12)
RBC # BLD AUTO: 4.37 10*6/MM3 (ref 4.14–5.8)
WBC NRBC COR # BLD AUTO: 3.27 10*3/MM3 (ref 3.4–10.8)

## 2025-06-10 PROCEDURE — 83615 LACTATE (LD) (LDH) ENZYME: CPT

## 2025-06-10 PROCEDURE — 85025 COMPLETE CBC W/AUTO DIFF WBC: CPT

## 2025-06-10 PROCEDURE — 36415 COLL VENOUS BLD VENIPUNCTURE: CPT

## 2025-06-10 NOTE — PROGRESS NOTES
Patient contacted via telephone for RN Review. CBC reviewed, counts are stable for this patient at this time. Pt is concerned about his platelet count continuing to be low. Asked about which vaccinations were appropriate to get for patient at this time. Educated pt on needed vaccinations. Advised pt will discuss pt's labs with Dr. Morrow and if any new orders, will let him know. He v/u.

## 2025-07-22 NOTE — PROGRESS NOTES
REASONS FOR FOLLOWUP:    Presentation with thrombocytopenia, mild leukopenia and splenomegaly.   Diagnosis hairy cell leukemia.   Status post hospitalization 02/06/2012-02/15/2012 per treatment with cladribine (2CdA x 7 days continuous IV infusion).   Evidence of CR noted 05/09/2012 with interval resolution of splenomegaly, notable on physical exam and peripheral blood smear.   Patien t with continued CR noted 09/05/2012, every 3 month assessment to 1 year anniversary planned, every 4 months 2nd year, every 6 months years 3-5.   Patient seen 03/05/2013 stable, with plans to move to every 4 month assessment. Patient proceeding through t reatment with Suboxone.   The patient was seen on 10/25/2013, stable hematologically and reassessment q.6 months planned.   The patient was seen 04/10/2014, status post recent apparent viral illness with normalization of peripheral blood counts and symptoms.   Patient seen 09/26/2014, stable - continued remission noted.   Patient seen on 03/17/2015, stable in continued remission, every 6 month assessment planned.   Patient seen every 6 months to yearly without evidence recurrence-reviewed December 2018  Hospitalization December 19-December 24-acute respiratory failure with hypoxia, metapneumovirus  Patient assessed May 27, 2020, no additional respiratory issues.  Plans made to reassess immunoglobulin levels and supplement as needed  Patient reassessed August 2020 with acceptable immunoglobulin levels.  Patient assessed November 16, 2020, stable, reexamination prior to [place] stay in December 2020  Patient assessed December 21, 2020, acceptable immunoglobulin levels, [facility] x2 months planned  Patient reassessed 4/26/2021 with no infectious complications.  Patient reviewed 4/21/2022 again without infectious complications, status post Evusheld  Patient seen 5/4/2023, no infectious complications, patient  urged to follow-up bivalent COVID-19 vaccination  Patient seen 11/1/2023, stable with mild evidence of cytopenias.  Evaluated 4/18/2024, no cytopenias, cardiovascular assessment ongoing  Evaluated 10/10/2024 stable, mild cytopenias?  Postviral illness  Similar findings with further leukopenia and thrombocytopenia assessed 4/24/2025, careful follow-up planned  Patient reviewed 7/23/2025 without additional hematologic deterioration, every 2 months assessments planned for 6 months                         History of Present Illness         The patient is a 65-year-old male who has been previously healthy. He had had some routine blood work performed recently with Dr. Sinclair on 01/17/2012 showing significant thrombocytopenia with a platelet count of 79,000. His white count was low normal at 4700 with decreased granulocytes at 31% with lymphocytes elevated at 55%. His serum chemistries at the time were unremarkable including normal liver function tests. Mr. Morales had been feeling reasonably w ell although he reported some fatigue. He has had no fevers, chills, night sweats or unexplained weight loss. He has been on a weight loss program and following a low carb diet and has intentionally lost about 40 pounds in the last year or two. He had noted some easier bruising but had been taking aspirin and fish oil even prior to noticing his platelet count was low.   On his examination in the office 01/31/2012 the patient had significant splenomegaly with spleen tip palpable approximately 7-8 cm below the left costal margin. The spleen is not particularly tender and did not extend to midline. Peripheral smear showed large platelets and atypical lymphocytes with no blasts. Mr. Morales was assessed by flow cytometry peripheral blood showing androgeni c profile consistent with hairy cell leukemia. Bone marrow aspirate and biopsy also was confirmatory for hairy cell leukemia with normal chromosomal complement. The patient was  negative for trisomy-12, deletion TP53 on chromosome 17 and P13.1 translocati o n involving IgH. Additionally CT abdomen and pelvis demonstrated splenomegaly with spleen measuring 22 cm in craniocaudal extent up to 16.7 x 8 in the axial plane. No other abnormalities were present. After discussion the patient was advised per linda osborn with 2CdA. He was admitted 02/06/2015-02/15/2012 with a dual lumen PICC line placed. 2CdA began after initial hydration and continued over the subsequent 7 days by continuous IV infusion. He did relatively well except for weakness and fatigue. He ha d additional issues with constipation and exacerbation of chronic low back pain. He further had development of neutropenia 02/12/2012 and was placed on Diflucan, Valtrex and Levaquin. These he also tolerated well. He also required transfusion just prior to discharge. Finally the patient was asked to return 02/16/2012 for Neulasta which he did return back to take in the office. He subsequently has done relatively well as an outpatient with white count increasing to 13,000 by 02/20/2012 and peaking at 20 , 000 by 02/24/2012. Platelet count also improved substantially peaking 02/23/2012 at 177,000. He has otherwise returned and we were able to remove his PICC line by 02/24/2012 and on 02/27/2012 continued resolution of his leukopenia, antiviral and antibac t erial medication discontinued. The patient now returns today feeling weak in the morning but otherwise doing well for the rest of the day without any fevers, chills, and resolution of night sweats. Appetite, weight and performance status have remained o verall excellent. He has an episode of gouty arthropathy involving his left great toe. He used Indocin briefly and then went back to allopurinol but is now having his gout return this morning.   The patient thereafter did use Indocin successful and thereafter restarted allopurinol. His gouty arthropathy responded appropriately and has not  returned. Followup counts were requested and as he returns today Mr. Morales is feeling well. He has returne d to exercise and nearly to his full-time job. He still notes some degree of fatigue in the a.m. and by the early afternoon approximately 12:30-1:00 p.m. He is at work full-time. He has had no fever, chills, weight loss, night sweats, nausea or vomiting, c hange in bowel habit.   The patient was asked to return back for followup reassessment. CT scan 05/02/2012 per splenomegaly showed spleen to have regressed substantially in size. The AP in 1st dimension previously 16.7 x 8 is now 12.2 by 6.1. No other abno rmalities were present. Peripheral smear also shows no evidence of recurrent disease. The patient feels well and has returned to normal activity.   The patient was asked to return for followup now seen 09/05/2012 doing well with normal activity again with considerable improvement in his general performance status.   The patient is now seen 03/05/2013 continuing to do well. It came to the attention however through a [facility] report that he is current on Suboxone through Dr. Kalia Davidson. In discussion with the patient he evidently had difficulty in trying to discontinue pain medications in the spring of 2012. He eventually saw Dr. Davidson who was hoping to wean him altogether from pain medications with the use of Suboxone. 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 He does this primarily through diet at present.   The patient thereafter was asked to return in followup. He is now seen on 10/25/2013 feeling well overall, continuing with exercise and dietary program. He feels well overall except for mild headache, approximately at 3:00-4:00 p.m. each day(?).   The patient thereafter was asked to be seen back 6 months later . He is now seen 09/26/2014 doing well continuing  his exercise and dietary program to terrific effect. He has had no issues since last seen and in fact has normalized his hypertension as a result of his dietary program.   The patient thereafter was asked to be seen 6 months later. He continues to do his exercise and dietary program to wonderful effect. He has had no additional issues since last seen.   The patient is now seen back 6 months later, 10/13/2015, and fortunately does continue to do amazingly well. He has maintained his exercise and dietary program, again to excellent and continued significant effect.   Patient now reviewed August 04, 2016.  He continues to feel well except for periodic fatigue.    The patient is reviewed December 04, 2017.  Hematologically he is stable and remains in remission.  Plans are to eventually discontinue Suboxone as well.    Patient is next seen December 13, 2018.  He continues to exercise regularly and feels generally good though has been concerned about possibly splenic discomfort.  He also describes that his mother is been diagnosed with neuroendocrine carcinoma involving the bowel and is undergoing treatment up to and including immunotherapy.    The patient, unfortunately, required hospitalization December 19 through December 24 having been admitted with pneumonia and bilateral infiltrates associate with acute respiratory failure.  His studies went on to reveal evidence of metapneumovirus infection and, again, he is felt to have pneumonia with immunocompromise state, treated with broad-spectrum antibiotics and ICU management.  This included IV steroids and additional assessment included an IgG reduced at 497, IgA 104, IgM 31 and IgA 58.  As result he was given IV immunoglobulin at 400 mg/kg.   Fortunately he went on to slowly and steadily improve able to be switched to oral antibiotic therapies, tapering steroids and was able to be discharged home.  Chest x-ray December 23 prior to discharge included lungs now that were  expanded with marked improvement in previous extensive bilateral infiltrates, cardiomegaly also noted.  Patient is next seen January 17, 2020 markedly improved and nearly back to normal activity.  We discussed immunoglobulin reassessment and possibly prophylaxis.  The patient is next seen May 27, 2020 and continues to feel well.  He has had no additional infectious complications and peripheral smear is normal when seen in office today.  At the time of this dictation his immunoglobulins have returned as consistent with IgA of 85, IgG of 614 and IgM of 26.  At this point additional supplementation is not necessary but may become important as we move into the fall.    We had the patient return for follow-up assessing him for immunoglobulin levels August 17 with IgG of 590, IgA of 90, IgM 27, no monoclonality.  IV immunoglobulin was nonapproved as result.    He is now contacted by telephone and he is feeling well. We discussed his current immunnoglobulin levels that do not warrant prophylactic IVIG at this point.  We would want to assess this in November just prior to a planned 2-month stay in [place].     The patient is next seen November 16, 2020 doing well though he now plans his trip to [place] in December.  We discussed reassessment prior to that trip.  Patient returns for repeat laboratory studies with quantitative hemoglobins obtained December 14 include an IgA of 78, IgG 567 and IgM of 31.  These are adequate and will not require infusions prior to stay in [place].  Fortunately he is feeling well and we discussed that he would be a candidate for COVID-19 vaccines as they become available.  The patient went on to be vaccinated and thereafter wintered in [place] for approximately 2 months. He is next reassessed 4/26/2021 having had no infectious complications and with a normal peripheral blood smear.    After discussion the patient was given Evusheld 3/21/2022.  He did well with this injection of his neck seen  back 4/21/2022 continue to feel reasonably well but concerned about his general pulmonary reserve as he tries to continue running.  He had been released by pulmonary medicine after his previous viral pneumonitis several years ago.    Patient reevaluated 5/4/2023.  He has been having additional issues, function, no infectious complications.  This includes lack of infections from his grandchildren as he participates in their care.  We discussed the COVID-19 vaccination is warranted.    The patient is next seen back 10/31/2023.  He had been reviewed by cardiology with his LDL in the 70-80 range and the patient undergoing coronary artery calcium score elevated to 57 with majority being in the LAD and RCA.  Recommendations 10/16/2023 included starting low-dose Crestor therapy continuing antihypertensive meds with the patient's coronary arterial calcification score around 11%.  The patient when seen is feeling quite well.  He is developing mild cytopenias and will be checked every 3 month intervals.    The patient is next seen 4/8/2024 with continued good performance status.  He had noticed some degree of food sticking when he was eating midesophagus.  He has previously undergone EGD with no abnormalities and previous imaging for cardiac calcium scoring had led to an assessment by cardiology and institution of low-dose Crestor therapy.    The patient is next seen 10/10/2024 indicating that he was reviewed by GI and placed on PPI therapy which has helped reflux symptoms.  He is otherwise feeling well with no significant changes per symptoms.  The patient is reviewed next 4/20/25 generally feeling well with follow-up CBC demonstrating further reduction for white count now 2740, platelet count 102,000.  These are modest changes suggesting the closer follow-up is now necessary.    The patient is next evaluated 7/23/2025 with follow-up CBC including H&H of 13.0 and 39.7 with white count of 2960, platelet count of 170,000 and  normal automated differential.  At this point no immature or atypical cells are seen.  His performance status remains excellent though we have discussed a follow-up COVID-19 vaccination prior to additional travel.    Past Medical History:   Diagnosis Date    Asthma     Atrial fibrillation     Cancer     hairy cell leukemia    Coronary artery disease     minimal with some minimal narrowing of 1 vessel    ED (erectile dysfunction) of organic origin     H/O vitamin D deficiency     Hypertension     Night sweats     Pneumonia     Thrombocytopenia        ONCOLOGIC HISTORY:  (History from previous dates can be found in the separate document.)    Current Outpatient Medications on File Prior to Visit   Medication Sig Dispense Refill    albuterol sulfate  (90 Base) MCG/ACT inhaler Inhale 2 puffs Every 4 (Four) Hours As Needed for Wheezing. 1 inhaler 3    buprenorphine-naloxone (SUBOXONE) 8-2 MG per SL tablet TAKE 2 TABLETS UNDER TONGUE EVERY MORNING CS EXPRESS SC HL  0    losartan (COZAAR) 100 MG tablet       metroNIDAZOLE (FLAGYL) 500 MG tablet Take 1 tablet by mouth Every 8 (Eight) Hours.      PreviDent 5000 Booster Plus 1.1 % paste See Admin Instructions. follow package directions      rosuvastatin (CRESTOR) 5 MG tablet Take 1 tablet by mouth Daily. 90 tablet 3    senna (SENOKOT) 8.6 MG tablet Take 1 tablet by mouth As Needed for Constipation.      tadalafil (CIALIS) 20 MG tablet Take 1 tablet by mouth Daily.      Tyrvaya 0.03 MG/ACT solution spray 1 spray into each nostril twice a day      vardenafil (LEVITRA) 20 MG tablet TAKE 1 TABLET BY MOUTH AS NEEDED FOR ERECTILE DYSFUNCTION 20 tablet 3    azithromycin (ZITHROMAX) 250 MG tablet 1 tablet. (Patient not taking: Reported on 4/25/2024)      sildenafil (REVATIO) 20 MG tablet Take 1 tablet by mouth Daily. 10 tablet 2     No current facility-administered medications on file prior to visit.       ALLERGIES:     Allergies   Allergen Reactions    Ciprofloxacin Rash  "      Social History     Socioeconomic History    Marital status:      Spouse name: Radha   Tobacco Use    Smoking status: Never     Passive exposure: Never    Smokeless tobacco: Never   Vaping Use    Vaping status: Never Used   Substance and Sexual Activity    Alcohol use: Yes     Alcohol/week: 2.0 standard drinks of alcohol     Types: 2 Cans of beer per week     Comment: social    Drug use: No    Sexual activity: Defer         Cancer-related family history includes Colon cancer in his mother.     Review of Systems   Constitutional:  Negative for fatigue.   Respiratory:  Negative for chest tightness and shortness of breath.    Gastrointestinal:  Negative for abdominal pain, constipation, diarrhea, nausea and vomiting.   Neurological:  Negative for weakness.   All other systems reviewed and are negative.    A comprehensive 14 point review of systems was performed and was negative except as mentioned.    Objective      Vitals:    07/23/25 1618   BP: 151/78   Pulse: 53   Resp: 16   Temp: 97.7 °F (36.5 °C)   TempSrc: Oral   SpO2: 99%   Weight: 69.2 kg (152 lb 9.6 oz)   Height: 162.6 cm (64.02\")   PainSc: 0-No pain                   7/23/2025     4:15 PM   Current Status   ECOG score 0        Physical Exam    GENERAL: Well-developed, well-nourished  male in no acute distress.   SKIN: Warm, dry without rashes, purpura or petechiae.   HEAD: Normocephalic.   EYES: Pupils equal, round and reactive to light. EOMs intact. Conjunctivae normal.   EARS: Hearing intact.   NOSE: Septum midline. No excoriations or nasal discharge.   MOUTH: Tongue is well-papillated; no stomatitis or ulcers. Lips normal.   THROAT: Oropharynx without lesions or exudates.   NECK: Supple with good range of motion; no thyromegaly or masses, no JVD or bruits.   LYMPHATICS: No cervical, supraclavicular, axillary or inguinal adenopathy.   CHEST: Lungs clear to percussion and auscultation.   CARDIAC: Regular rate and rhythm without " murmurs, rubs or gallops.   ABDOMEN: Soft, nontender with no organomegaly or masses.   EXTREMITIES: No clubbing, cyanosis or edema.   NEUROLOGICAL: No focal neurological deficits.    RECENT LABS:  Hematology WBC   Date Value Ref Range Status   07/23/2025 2.96 (L) 3.40 - 10.80 10*3/mm3 Final   02/20/2020 3.85 3.40 - 10.80 10*3/mm3 Final     RBC   Date Value Ref Range Status   07/23/2025 4.46 4.14 - 5.80 10*6/mm3 Final   02/20/2020 4.55 4.14 - 5.80 10*6/mm3 Final     Hemoglobin   Date Value Ref Range Status   07/23/2025 13.0 13.0 - 17.7 g/dL Final     Hematocrit   Date Value Ref Range Status   07/23/2025 39.7 37.5 - 51.0 % Final     Platelets   Date Value Ref Range Status   07/23/2025 107 (L) 140 - 450 10*3/mm3 Final        Assessment & Plan       A 65-year-old male with a history of hairy cell leukemia. He presented with leukopenia, thrombocytopenia, and splenomegaly. After diagnosis was confirmed he was treated with cladribine 02/06/2012-02/15/2012 by continuous IV infusion. ALLYSON puentes developed cytopenias, but again not to a serious degree and ultimately exam in late February 2012 was negative for evidence of residual and flow cytometric assessment. Subsequent physical examination showed resolution of splenomegaly and normalization o f performance status. Followup CT also normalized as well per splenomegaly. The patient has been followed since and when seen in March 2013 and October 2013, was felt to be in CR. He had been seen just after recent viral illness and though he was feeling poorly, symptoms went on to improve and returned to baseline status. At 6 month review, the patient continues in remission. This further vindicates and since last visit, he has had no additional urinary symptoms either.   At this point we find no evidence of recurrent disease and again feel that Mr. Morales remains in complete remission. This has been the case in 03/17/2015 and again is notable 10/13/2015 as well as April 2016, August 2016  and December 2017 .  The patient was last seen December 13, 2018 doing well without evidence recurrence .       The patient, unfortunately, required hospitalization December 19 through December 24 having been admitted with pneumonia and bilateral infiltrates associate with acute respiratory failure.  His studies went on to reveal evidence of metapneumovirus infection and, again, he is felt to have pneumonia with immunocompromise state, treated with broad-spectrum antibiotics and ICU management.  This included IV steroids and additional assessment included an IgG reduced at 497, IgA 104, IgM 31 and IgA 58.  As result he was given IV immunoglobulin at 400 mg/kg.   Fortunately he went on to slowly and steadily improve able to be switched to oral antibiotic therapies, tapering steroids and was able to be discharged home.  Chest x-ray December 23 prior to discharge included lungs now that were expanded with marked improvement in previous extensive bilateral infiltrates, cardiomegaly also noted.  As he and his wife are seen back January 7, 2020 we have discussed that he should be reassessed per hemoglobin levels including a possible subclass deficiency and be considered for potential prophylaxis with IVIG.  This is not absolute and that he has done well for many years without requiring such infusions but does bear further assessment.     The patient is next seen May 27, 2020 with immunoglobulins remaining relatively stable at above 600 drawn May 18, 2020 for IVIG, at which time subclasses were also acceptable, recheck May 27 with IgG of 614.  At this point supplementation is not necessary that we may review this as the fall approaches.  The patient agreeable to this plan.   The patient is reassessed August 24 2020 with repeat hemoglobin levels that are at the lower end of normal for IgG.  You will not need immunoglobulin at this point.  He and his wife do plan a trip to [place] likely in December.  We will check him prior  to that.  The patient is reassessed November 6, 2020 with IgG of 582, IgM of 27 and IgA of 87.  He was doing well we asked him to be reassessed December 15 for his quantitative immunoglobins prior to a stay in [place].  Fortunately these are found to be adequate and will not need to be supplemented.     Patient is reassessed 4/26/2021 doing well without any evidence of progression or recurrence of his hairy cell leukemia and, fortunately, no infectious complications.  This is again the case and the patient was given Evusheld 3/21/2022.  As he is seen 4/21/2022 his general performance status is excellent we discussed follow-up including pulmonary review as needed.    Patient reevaluated 5/4/2023.  He has been having additional issues, function, no infectious complications.  This includes lack of infections from his grandchildren as he participates in their care.  We discussed the COVID-19 vaccination is warranted.  Additional recent studies include acceptable CBC, paraprotein studies with no monoclonality, IgG of 586, IgA 95, IgM 36.    The patient is next seen back 10/31/2023.  He had been reviewed by cardiology with his LDL in the 70-80 range and the patient undergoing coronary artery calcium score elevated to 57 with majority being in the LAD and RCA.  Recommendations 10/16/2023 included starting low-dose Crestor therapy continuing antihypertensive meds with the patient's coronary arterial calcification score around 11%.  The patient when seen is feeling quite well.  He is developing mild cytopenias and will be checked every 3 month intervals.      The patient is next seen 4/8/2024 with continued good performance status.  He had noticed some degree of food sticking when he was eating midesophagus.  He has previously undergone EGD with no abnormalities and previous imaging for cardiac calcium scoring had led to an assessment by cardiology and institution of low-dose Crestor therapy.    The patient is next seen  10/10/2024 indicating that he was reviewed by GI and placed on PPI therapy which has helped reflux symptoms.  He is otherwise feeling well with no significant changes per symptoms.    The patient is reviewed next 4/20/25 generally feeling well with follow-up CBC demonstrating further reduction for white count now 2740, platelet count 102,000.  These are modest changes suggesting the closer follow-up is now necessary.    The patient is next evaluated 7/23/2025 with follow-up CBC including H&H of 13.0 and 39.7 with white count of 2960, platelet count of 170,000 and normal automated differential.  At this point no immature or atypical cells are seen.  His performance status remains excellent though we have discussed a follow-up COVID-19 vaccination prior to additional travel.      Plan:  *Every 2 month CBC    *COVID-19 vaccination    *6 6 months MD, CBC

## 2025-07-23 ENCOUNTER — LAB (OUTPATIENT)
Dept: LAB | Facility: HOSPITAL | Age: 65
End: 2025-07-23
Payer: MEDICARE

## 2025-07-23 ENCOUNTER — OFFICE VISIT (OUTPATIENT)
Dept: ONCOLOGY | Facility: CLINIC | Age: 65
End: 2025-07-23
Payer: MEDICARE

## 2025-07-23 VITALS
HEART RATE: 53 BPM | OXYGEN SATURATION: 99 % | SYSTOLIC BLOOD PRESSURE: 151 MMHG | HEIGHT: 64 IN | TEMPERATURE: 97.7 F | WEIGHT: 152.6 LBS | DIASTOLIC BLOOD PRESSURE: 78 MMHG | BODY MASS INDEX: 26.05 KG/M2 | RESPIRATION RATE: 16 BRPM

## 2025-07-23 DIAGNOSIS — C91.41 HAIRY CELL LEUKEMIA, IN REMISSION: Primary | ICD-10-CM

## 2025-07-23 DIAGNOSIS — C91.41 HAIRY CELL LEUKEMIA, IN REMISSION: ICD-10-CM

## 2025-07-23 LAB
BASOPHILS # BLD AUTO: 0.01 10*3/MM3 (ref 0–0.2)
BASOPHILS NFR BLD AUTO: 0.3 % (ref 0–1.5)
DEPRECATED RDW RBC AUTO: 42.3 FL (ref 37–54)
EOSINOPHIL # BLD AUTO: 0.03 10*3/MM3 (ref 0–0.4)
EOSINOPHIL NFR BLD AUTO: 1 % (ref 0.3–6.2)
ERYTHROCYTE [DISTWIDTH] IN BLOOD BY AUTOMATED COUNT: 13 % (ref 12.3–15.4)
HCT VFR BLD AUTO: 39.7 % (ref 37.5–51)
HGB BLD-MCNC: 13 G/DL (ref 13–17.7)
IMM GRANULOCYTES # BLD AUTO: 0.01 10*3/MM3 (ref 0–0.05)
IMM GRANULOCYTES NFR BLD AUTO: 0.3 % (ref 0–0.5)
LDH SERPL-CCNC: 166 U/L (ref 135–225)
LYMPHOCYTES # BLD AUTO: 0.6 10*3/MM3 (ref 0.7–3.1)
LYMPHOCYTES NFR BLD AUTO: 20.3 % (ref 19.6–45.3)
MCH RBC QN AUTO: 29.1 PG (ref 26.6–33)
MCHC RBC AUTO-ENTMCNC: 32.7 G/DL (ref 31.5–35.7)
MCV RBC AUTO: 89 FL (ref 79–97)
MONOCYTES # BLD AUTO: 0.11 10*3/MM3 (ref 0.1–0.9)
MONOCYTES NFR BLD AUTO: 3.7 % (ref 5–12)
NEUTROPHILS NFR BLD AUTO: 2.2 10*3/MM3 (ref 1.7–7)
NEUTROPHILS NFR BLD AUTO: 74.4 % (ref 42.7–76)
NRBC BLD AUTO-RTO: 0 /100 WBC (ref 0–0.2)
PLATELET # BLD AUTO: 107 10*3/MM3 (ref 140–450)
PMV BLD AUTO: 9.4 FL (ref 6–12)
RBC # BLD AUTO: 4.46 10*6/MM3 (ref 4.14–5.8)
WBC NRBC COR # BLD AUTO: 2.96 10*3/MM3 (ref 3.4–10.8)

## 2025-07-23 PROCEDURE — 99213 OFFICE O/P EST LOW 20 MIN: CPT | Performed by: INTERNAL MEDICINE

## 2025-07-23 PROCEDURE — 3077F SYST BP >= 140 MM HG: CPT | Performed by: INTERNAL MEDICINE

## 2025-07-23 PROCEDURE — 36415 COLL VENOUS BLD VENIPUNCTURE: CPT

## 2025-07-23 PROCEDURE — 1126F AMNT PAIN NOTED NONE PRSNT: CPT | Performed by: INTERNAL MEDICINE

## 2025-07-23 PROCEDURE — 85025 COMPLETE CBC W/AUTO DIFF WBC: CPT

## 2025-07-23 PROCEDURE — 83615 LACTATE (LD) (LDH) ENZYME: CPT

## 2025-07-23 PROCEDURE — 3078F DIAST BP <80 MM HG: CPT | Performed by: INTERNAL MEDICINE
